# Patient Record
Sex: FEMALE | Race: BLACK OR AFRICAN AMERICAN | URBAN - METROPOLITAN AREA
[De-identification: names, ages, dates, MRNs, and addresses within clinical notes are randomized per-mention and may not be internally consistent; named-entity substitution may affect disease eponyms.]

---

## 2024-07-10 ENCOUNTER — ANESTHESIA (OUTPATIENT)
Dept: OBSTETRICS AND GYNECOLOGY | Facility: HOSPITAL | Age: 24
End: 2024-07-10
Payer: MEDICAID

## 2024-07-10 ENCOUNTER — HOSPITAL ENCOUNTER (INPATIENT)
Facility: HOSPITAL | Age: 24
LOS: 1 days | Discharge: HOME | End: 2024-07-11
Attending: STUDENT IN AN ORGANIZED HEALTH CARE EDUCATION/TRAINING PROGRAM | Admitting: STUDENT IN AN ORGANIZED HEALTH CARE EDUCATION/TRAINING PROGRAM
Payer: MEDICAID

## 2024-07-10 ENCOUNTER — ANESTHESIA EVENT (OUTPATIENT)
Dept: OBSTETRICS AND GYNECOLOGY | Facility: HOSPITAL | Age: 24
End: 2024-07-10
Payer: MEDICAID

## 2024-07-10 DIAGNOSIS — O26.893 ABDOMINAL PAIN DURING PREGNANCY IN THIRD TRIMESTER (HHS-HCC): Primary | ICD-10-CM

## 2024-07-10 DIAGNOSIS — K75.9 HEPATITIS: ICD-10-CM

## 2024-07-10 DIAGNOSIS — D69.6 THROMBOCYTOPENIA (CMS-HCC): ICD-10-CM

## 2024-07-10 DIAGNOSIS — O34.219 HISTORY OF CESAREAN DELIVERY AFFECTING PREGNANCY (HHS-HCC): ICD-10-CM

## 2024-07-10 DIAGNOSIS — O46.90 VAGINAL BLEEDING DURING PREGNANCY, ANTEPARTUM (HHS-HCC): ICD-10-CM

## 2024-07-10 DIAGNOSIS — B18.1: ICD-10-CM

## 2024-07-10 DIAGNOSIS — O36.4XX0 IUFD AT 20 WEEKS OR MORE OF GESTATION (HHS-HCC): ICD-10-CM

## 2024-07-10 DIAGNOSIS — O98.419: ICD-10-CM

## 2024-07-10 DIAGNOSIS — R10.9 ABDOMINAL PAIN DURING PREGNANCY IN THIRD TRIMESTER (HHS-HCC): Primary | ICD-10-CM

## 2024-07-10 LAB
ABO GROUP (TYPE) IN BLOOD: NORMAL
ABO GROUP (TYPE) IN BLOOD: NORMAL
ALBUMIN SERPL BCP-MCNC: 4.2 G/DL (ref 3.4–5)
ALP SERPL-CCNC: 147 U/L (ref 33–110)
ALT SERPL W P-5'-P-CCNC: 13 U/L (ref 7–45)
ANION GAP SERPL CALC-SCNC: 14 MMOL/L (ref 10–20)
ANTIBODY SCREEN: NORMAL
APTT PPP: 26 SECONDS (ref 27–38)
AST SERPL W P-5'-P-CCNC: 24 U/L (ref 9–39)
BILIRUB SERPL-MCNC: 1.3 MG/DL (ref 0–1.2)
BUN SERPL-MCNC: 6 MG/DL (ref 6–23)
CALCIUM SERPL-MCNC: 9.5 MG/DL (ref 8.6–10.6)
CHLORIDE SERPL-SCNC: 104 MMOL/L (ref 98–107)
CO2 SERPL-SCNC: 23 MMOL/L (ref 21–32)
CREAT SERPL-MCNC: 0.43 MG/DL (ref 0.5–1.05)
EGFRCR SERPLBLD CKD-EPI 2021: >90 ML/MIN/1.73M*2
ERYTHROCYTE [DISTWIDTH] IN BLOOD BY AUTOMATED COUNT: 13.3 % (ref 11.5–14.5)
ERYTHROCYTE [DISTWIDTH] IN BLOOD BY AUTOMATED COUNT: 13.4 % (ref 11.5–14.5)
FIBRINOGEN PPP-MCNC: 321 MG/DL (ref 200–400)
GLUCOSE SERPL-MCNC: 97 MG/DL (ref 74–99)
HCT VFR BLD AUTO: 29.5 % (ref 36–46)
HCT VFR BLD AUTO: 36.1 % (ref 36–46)
HGB BLD-MCNC: 11.4 G/DL (ref 12–16)
HGB BLD-MCNC: 9.5 G/DL (ref 12–16)
INR PPP: 0.9 (ref 0.9–1.1)
MCH RBC QN AUTO: 25.9 PG (ref 26–34)
MCH RBC QN AUTO: 26.3 PG (ref 26–34)
MCHC RBC AUTO-ENTMCNC: 31.6 G/DL (ref 32–36)
MCHC RBC AUTO-ENTMCNC: 32.2 G/DL (ref 32–36)
MCV RBC AUTO: 82 FL (ref 80–100)
MCV RBC AUTO: 82 FL (ref 80–100)
NRBC BLD-RTO: 0 /100 WBCS (ref 0–0)
NRBC BLD-RTO: 0 /100 WBCS (ref 0–0)
PLATELET # BLD AUTO: 79 X10*3/UL (ref 150–450)
PLATELET # BLD AUTO: 95 X10*3/UL (ref 150–450)
POTASSIUM SERPL-SCNC: 3.8 MMOL/L (ref 3.5–5.3)
PROT SERPL-MCNC: 7.8 G/DL (ref 6.4–8.2)
PROTHROMBIN TIME: 10.6 SECONDS (ref 9.8–12.8)
RBC # BLD AUTO: 3.61 X10*6/UL (ref 4–5.2)
RBC # BLD AUTO: 4.41 X10*6/UL (ref 4–5.2)
RH FACTOR (ANTIGEN D): NORMAL
RH FACTOR (ANTIGEN D): NORMAL
SODIUM SERPL-SCNC: 137 MMOL/L (ref 136–145)
TREPONEMA PALLIDUM IGG+IGM AB [PRESENCE] IN SERUM OR PLASMA BY IMMUNOASSAY: NONREACTIVE
WBC # BLD AUTO: 5.9 X10*3/UL (ref 4.4–11.3)
WBC # BLD AUTO: 7.6 X10*3/UL (ref 4.4–11.3)

## 2024-07-10 PROCEDURE — 85610 PROTHROMBIN TIME: CPT | Performed by: STUDENT IN AN ORGANIZED HEALTH CARE EDUCATION/TRAINING PROGRAM

## 2024-07-10 PROCEDURE — 85027 COMPLETE CBC AUTOMATED: CPT | Performed by: STUDENT IN AN ORGANIZED HEALTH CARE EDUCATION/TRAINING PROGRAM

## 2024-07-10 PROCEDURE — 86923 COMPATIBILITY TEST ELECTRIC: CPT

## 2024-07-10 PROCEDURE — 2500000004 HC RX 250 GENERAL PHARMACY W/ HCPCS (ALT 636 FOR OP/ED): Performed by: STUDENT IN AN ORGANIZED HEALTH CARE EDUCATION/TRAINING PROGRAM

## 2024-07-10 PROCEDURE — 59409 OBSTETRICAL CARE: CPT | Performed by: STUDENT IN AN ORGANIZED HEALTH CARE EDUCATION/TRAINING PROGRAM

## 2024-07-10 PROCEDURE — 86780 TREPONEMA PALLIDUM: CPT | Performed by: STUDENT IN AN ORGANIZED HEALTH CARE EDUCATION/TRAINING PROGRAM

## 2024-07-10 PROCEDURE — 36415 COLL VENOUS BLD VENIPUNCTURE: CPT | Performed by: STUDENT IN AN ORGANIZED HEALTH CARE EDUCATION/TRAINING PROGRAM

## 2024-07-10 PROCEDURE — 80053 COMPREHEN METABOLIC PANEL: CPT | Performed by: STUDENT IN AN ORGANIZED HEALTH CARE EDUCATION/TRAINING PROGRAM

## 2024-07-10 PROCEDURE — 85384 FIBRINOGEN ACTIVITY: CPT | Performed by: STUDENT IN AN ORGANIZED HEALTH CARE EDUCATION/TRAINING PROGRAM

## 2024-07-10 PROCEDURE — 86901 BLOOD TYPING SEROLOGIC RH(D): CPT | Performed by: STUDENT IN AN ORGANIZED HEALTH CARE EDUCATION/TRAINING PROGRAM

## 2024-07-10 PROCEDURE — 1100000001 HC PRIVATE ROOM DAILY

## 2024-07-10 PROCEDURE — 2500000004 HC RX 250 GENERAL PHARMACY W/ HCPCS (ALT 636 FOR OP/ED)

## 2024-07-10 RX ORDER — ONDANSETRON HYDROCHLORIDE 2 MG/ML
4 INJECTION, SOLUTION INTRAVENOUS EVERY 6 HOURS PRN
Status: CANCELLED | OUTPATIENT
Start: 2024-07-10

## 2024-07-10 RX ORDER — OXYTOCIN 10 [USP'U]/ML
10 INJECTION, SOLUTION INTRAMUSCULAR; INTRAVENOUS ONCE AS NEEDED
Status: DISCONTINUED | OUTPATIENT
Start: 2024-07-10 | End: 2024-07-11 | Stop reason: HOSPADM

## 2024-07-10 RX ORDER — OXYTOCIN 10 [USP'U]/ML
10 INJECTION, SOLUTION INTRAMUSCULAR; INTRAVENOUS ONCE AS NEEDED
Status: CANCELLED | OUTPATIENT
Start: 2024-07-10

## 2024-07-10 RX ORDER — ADHESIVE BANDAGE
10 BANDAGE TOPICAL
Status: CANCELLED | OUTPATIENT
Start: 2024-07-10

## 2024-07-10 RX ORDER — ONDANSETRON 4 MG/1
4 TABLET, FILM COATED ORAL EVERY 6 HOURS PRN
Status: DISCONTINUED | OUTPATIENT
Start: 2024-07-10 | End: 2024-07-11 | Stop reason: HOSPADM

## 2024-07-10 RX ORDER — OXYTOCIN/0.9 % SODIUM CHLORIDE 30/500 ML
60 PLASTIC BAG, INJECTION (ML) INTRAVENOUS ONCE AS NEEDED
Status: CANCELLED | OUTPATIENT
Start: 2024-07-10

## 2024-07-10 RX ORDER — CARBOPROST TROMETHAMINE 250 UG/ML
250 INJECTION, SOLUTION INTRAMUSCULAR ONCE AS NEEDED
Status: DISCONTINUED | OUTPATIENT
Start: 2024-07-10 | End: 2024-07-11 | Stop reason: HOSPADM

## 2024-07-10 RX ORDER — MISOPROSTOL 200 UG/1
800 TABLET ORAL ONCE AS NEEDED
Status: DISCONTINUED | OUTPATIENT
Start: 2024-07-10 | End: 2024-07-11 | Stop reason: HOSPADM

## 2024-07-10 RX ORDER — LIDOCAINE 560 MG/1
1 PATCH PERCUTANEOUS; TOPICAL; TRANSDERMAL
Status: CANCELLED | OUTPATIENT
Start: 2024-07-10

## 2024-07-10 RX ORDER — HYDRALAZINE HYDROCHLORIDE 20 MG/ML
5 INJECTION INTRAMUSCULAR; INTRAVENOUS ONCE AS NEEDED
Status: DISCONTINUED | OUTPATIENT
Start: 2024-07-10 | End: 2024-07-11 | Stop reason: HOSPADM

## 2024-07-10 RX ORDER — ONDANSETRON HYDROCHLORIDE 2 MG/ML
4 INJECTION, SOLUTION INTRAVENOUS EVERY 6 HOURS PRN
Status: DISCONTINUED | OUTPATIENT
Start: 2024-07-10 | End: 2024-07-11 | Stop reason: HOSPADM

## 2024-07-10 RX ORDER — CARBOPROST TROMETHAMINE 250 UG/ML
250 INJECTION, SOLUTION INTRAMUSCULAR ONCE AS NEEDED
Status: CANCELLED | OUTPATIENT
Start: 2024-07-10

## 2024-07-10 RX ORDER — METHYLERGONOVINE MALEATE 0.2 MG/ML
0.2 INJECTION INTRAVENOUS ONCE AS NEEDED
Status: DISCONTINUED | OUTPATIENT
Start: 2024-07-10 | End: 2024-07-11 | Stop reason: HOSPADM

## 2024-07-10 RX ORDER — LOPERAMIDE HYDROCHLORIDE 2 MG/1
4 CAPSULE ORAL EVERY 2 HOUR PRN
Status: DISCONTINUED | OUTPATIENT
Start: 2024-07-10 | End: 2024-07-11 | Stop reason: HOSPADM

## 2024-07-10 RX ORDER — OXYTOCIN/0.9 % SODIUM CHLORIDE 30/500 ML
60 PLASTIC BAG, INJECTION (ML) INTRAVENOUS ONCE AS NEEDED
Status: COMPLETED | OUTPATIENT
Start: 2024-07-10 | End: 2024-07-10

## 2024-07-10 RX ORDER — NIFEDIPINE 10 MG/1
10 CAPSULE ORAL ONCE AS NEEDED
Status: CANCELLED | OUTPATIENT
Start: 2024-07-10

## 2024-07-10 RX ORDER — MISOPROSTOL 200 UG/1
800 TABLET ORAL ONCE AS NEEDED
Status: CANCELLED | OUTPATIENT
Start: 2024-07-10

## 2024-07-10 RX ORDER — METOCLOPRAMIDE 10 MG/1
10 TABLET ORAL EVERY 6 HOURS PRN
Status: DISCONTINUED | OUTPATIENT
Start: 2024-07-10 | End: 2024-07-11 | Stop reason: HOSPADM

## 2024-07-10 RX ORDER — ONDANSETRON 4 MG/1
4 TABLET, FILM COATED ORAL EVERY 6 HOURS PRN
Status: CANCELLED | OUTPATIENT
Start: 2024-07-10

## 2024-07-10 RX ORDER — HYDROMORPHONE HYDROCHLORIDE 1 MG/ML
INJECTION, SOLUTION INTRAMUSCULAR; INTRAVENOUS; SUBCUTANEOUS
Status: COMPLETED
Start: 2024-07-10 | End: 2024-07-10

## 2024-07-10 RX ORDER — IBUPROFEN 600 MG/1
600 TABLET ORAL EVERY 6 HOURS
Status: DISCONTINUED | OUTPATIENT
Start: 2024-07-10 | End: 2024-07-11 | Stop reason: HOSPADM

## 2024-07-10 RX ORDER — TERBUTALINE SULFATE 1 MG/ML
0.25 INJECTION SUBCUTANEOUS ONCE AS NEEDED
Status: DISCONTINUED | OUTPATIENT
Start: 2024-07-10 | End: 2024-07-11 | Stop reason: HOSPADM

## 2024-07-10 RX ORDER — DIPHENHYDRAMINE HYDROCHLORIDE 50 MG/ML
25 INJECTION INTRAMUSCULAR; INTRAVENOUS EVERY 6 HOURS PRN
Status: CANCELLED | OUTPATIENT
Start: 2024-07-10

## 2024-07-10 RX ORDER — ACETAMINOPHEN 325 MG/1
975 TABLET ORAL EVERY 6 HOURS
Status: DISCONTINUED | OUTPATIENT
Start: 2024-07-10 | End: 2024-07-11 | Stop reason: HOSPADM

## 2024-07-10 RX ORDER — OXYTOCIN/0.9 % SODIUM CHLORIDE 30/500 ML
PLASTIC BAG, INJECTION (ML) INTRAVENOUS
Status: DISPENSED
Start: 2024-07-10 | End: 2024-07-11

## 2024-07-10 RX ORDER — METOCLOPRAMIDE HYDROCHLORIDE 5 MG/ML
10 INJECTION INTRAMUSCULAR; INTRAVENOUS EVERY 6 HOURS PRN
Status: DISCONTINUED | OUTPATIENT
Start: 2024-07-10 | End: 2024-07-11 | Stop reason: HOSPADM

## 2024-07-10 RX ORDER — SODIUM CHLORIDE, SODIUM LACTATE, POTASSIUM CHLORIDE, CALCIUM CHLORIDE 600; 310; 30; 20 MG/100ML; MG/100ML; MG/100ML; MG/100ML
125 INJECTION, SOLUTION INTRAVENOUS CONTINUOUS
Status: DISCONTINUED | OUTPATIENT
Start: 2024-07-10 | End: 2024-07-11 | Stop reason: HOSPADM

## 2024-07-10 RX ORDER — HYDRALAZINE HYDROCHLORIDE 20 MG/ML
5 INJECTION INTRAMUSCULAR; INTRAVENOUS ONCE AS NEEDED
Status: CANCELLED | OUTPATIENT
Start: 2024-07-10

## 2024-07-10 RX ORDER — LABETALOL HYDROCHLORIDE 5 MG/ML
20 INJECTION, SOLUTION INTRAVENOUS ONCE AS NEEDED
Status: DISCONTINUED | OUTPATIENT
Start: 2024-07-10 | End: 2024-07-11 | Stop reason: HOSPADM

## 2024-07-10 RX ORDER — METHYLERGONOVINE MALEATE 0.2 MG/ML
0.2 INJECTION INTRAVENOUS ONCE AS NEEDED
Status: CANCELLED | OUTPATIENT
Start: 2024-07-10

## 2024-07-10 RX ORDER — BISACODYL 10 MG/1
10 SUPPOSITORY RECTAL DAILY PRN
Status: CANCELLED | OUTPATIENT
Start: 2024-07-10

## 2024-07-10 RX ORDER — LIDOCAINE HYDROCHLORIDE 10 MG/ML
30 INJECTION INFILTRATION; PERINEURAL ONCE AS NEEDED
Status: DISCONTINUED | OUTPATIENT
Start: 2024-07-10 | End: 2024-07-11 | Stop reason: HOSPADM

## 2024-07-10 RX ORDER — SIMETHICONE 80 MG
80 TABLET,CHEWABLE ORAL 4 TIMES DAILY PRN
Status: CANCELLED | OUTPATIENT
Start: 2024-07-10

## 2024-07-10 RX ORDER — LABETALOL HYDROCHLORIDE 5 MG/ML
20 INJECTION, SOLUTION INTRAVENOUS ONCE AS NEEDED
Status: CANCELLED | OUTPATIENT
Start: 2024-07-10

## 2024-07-10 RX ORDER — POLYETHYLENE GLYCOL 3350 17 G/17G
17 POWDER, FOR SOLUTION ORAL 2 TIMES DAILY PRN
Status: CANCELLED | OUTPATIENT
Start: 2024-07-10

## 2024-07-10 RX ORDER — NIFEDIPINE 10 MG/1
10 CAPSULE ORAL ONCE AS NEEDED
Status: DISCONTINUED | OUTPATIENT
Start: 2024-07-10 | End: 2024-07-11 | Stop reason: HOSPADM

## 2024-07-10 RX ORDER — LOPERAMIDE HYDROCHLORIDE 2 MG/1
4 CAPSULE ORAL EVERY 2 HOUR PRN
Status: CANCELLED | OUTPATIENT
Start: 2024-07-10

## 2024-07-10 RX ORDER — DIPHENHYDRAMINE HCL 25 MG
25 CAPSULE ORAL EVERY 6 HOURS PRN
Status: CANCELLED | OUTPATIENT
Start: 2024-07-10

## 2024-07-10 RX ORDER — TRANEXAMIC ACID 100 MG/ML
1000 INJECTION, SOLUTION INTRAVENOUS ONCE AS NEEDED
Status: DISCONTINUED | OUTPATIENT
Start: 2024-07-10 | End: 2024-07-11 | Stop reason: HOSPADM

## 2024-07-10 RX ORDER — TRANEXAMIC ACID 100 MG/ML
1000 INJECTION, SOLUTION INTRAVENOUS ONCE AS NEEDED
Status: CANCELLED | OUTPATIENT
Start: 2024-07-10

## 2024-07-10 ASSESSMENT — PAIN SCALES - GENERAL
PAINLEVEL_OUTOF10: 0 - NO PAIN
PAINLEVEL_OUTOF10: 0 - NO PAIN

## 2024-07-10 NOTE — PROGRESS NOTES
Intrapartum Progress Note    Assessment/Plan   Ramez Stoddard is a 23 y.o.  at 39w3d, ALFRED: 2024, by Other Basis admitted for contractions and bleeding, found to have an IUFD    IUFD, abruption  No fetal cardiac activity on bedside ultrasound, reconfirmed on ultrasound with Dr. Elias. No doppler flow.   Risk reducing cfDNA per chart review. Normal anatomy ultrasound  Reviewed causes of IUFD and evaluation. Reviewed that at this time, we do not know why this happened. Condolences expressed.  Patient uncomfortably gladys. Patient and partner expressed desire for time to process. Will plan to come back shortly for cervical examination and to discuss expectant management if in labor vs. augmentation  Records requested from [facility]  A1c, TSH, APLS, infectious workup ordered, but patient currently declining further blood draws. Will readdress and collect if patient amenable    Thrombocytopenia, chronic  Prenatal records indicate chronic thrombocytopenia. Initially in low 100-110s, progressing to 70s throughout prenatal course  Platelets on admission 79  BPs all normotensive, low suspicion for pre-eclampsia process  S/p hematology consultation on 3/13/24: at that time, differential including congential thrombocytopenia with concomitant gestational thrombocytopenia vs hepatitis B vs underlying liver or spleen disorder  Abdominal ultrasound at that time notable for splenomegaly to 17cm. Normal liver and gallbladder.  Will consider consultation during admission    Hepatitis B infection  Patient positive for hepatitis B  During prenatal care recommended to follow up with hepatology, patient has not seen  Will refer on discharge    Maternal left pelvic horseshoe kidney  Noted on prenatal ultrasound  No current indication for OR, for attention if patient requires OR    Prenatal care  Hx Csx1, term, 8#13oz, unknown uterine incision, failure to progress in first stage  Rh positive, no  antibodies  Hepatitis B positive as above  GC/CT negative  RPR false positive per outside records, pending on admission    Seen and discussed with Dr. Elias. Martti  used for encounter.    Meseret Quintero MD  PGY-4, Obstetrics and Gynecology    Subjective   Patient breathing uncomfortably through contractions    Objective   Last Vitals:  Temp Pulse Resp BP MAP Pulse Ox   36.4 °C (97.5 °F) 95 20 122/67   99 %     Vitals Min/Max Last 24 Hours:  Temp  Min: 36.4 °C (97.5 °F)  Max: 36.4 °C (97.5 °F)  Pulse  Min: 90  Max: 112  Resp  Min: 16  Max: 20  BP  Min: 111/74  Max: 122/67    Intake/Output:  No intake or output data in the 24 hours ending 07/10/24 1248    Physical Examination:  General: breathing uncomfortably   HEENT: normocephalic, atraumatic  Heart: warm and well perfused  Lungs: breathing comfortably on room air  Abdomen: gravid  Extremities: moving all extremities  Neuro: awake and conversant  Psych: appropriate mood and affect    Bedside ultrasound with single fetus in cephalic presentation, no fetal cardiac activity or blood flow present through organs.    Lab Review:  Labs in chart have been reviewed

## 2024-07-10 NOTE — SIGNIFICANT EVENT
Attending provider to bedside in the setting of new onset vaginal bleeding and abdominal pain.     In short,  at 39.3, records from [facility] unavailable, history of thrombocytopenia and  delivery, [place] speaking.    On bedside ultrasound, unable to visualize fetal cardiac activity. Doppler flow applied to bladder, abdomen, thorax and brain with no flow. Subjective oligohydramnios, cephalic.     Informed patient of concern for fetal demise. She requested waiting for her  prior to any further counseling or work up.     Handed off to day team.

## 2024-07-10 NOTE — NURSING NOTE
Pt refusing fundal exams. RN explained importance of fundal exams and voiced concern for bleeding. (Martti used for translation) Pt refused perineal repair for second degree laceration at delivery, now asking to be left alone. RN unable to complete full head to toe post-delivery. Curt SANTOS notified. Blood pressures currently cycling every 15 minutes.

## 2024-07-10 NOTE — L&D DELIVERY NOTE
OB Delivery Note  7/10/2024  Ramez Stoddard  23 y.o.   Vaginal, Spontaneous     Gestational Age: 39w3d  /Para:   Quantitative Blood Loss: 400 mL    Armin Stoddard FD [[id number]]      Labor Events    Rupture date/time: 7/10/2024 0530  Rupture type: Spontaneous  Fluid color: Meconium  Fluid odor: None  Labor type: Spontaneous Onset of Labor  Labor allowed to proceed with plans for an attempted vaginal birth?: Yes  Augmentation: None  Complications: None  Additional complications: IUFD at 20 weeks or more of gestation ([facility])       Labor Event Times    Dilation complete date/time: 7/10/2024 1145  Start pushing date/time: 7/10/2024 1150       Labor Length    2nd stage: 0h 07m  3rd stage: 0h 04m       Placenta    Placenta delivery date/time: 7/10/2024 1157  Placenta removal: Spontaneous  Placenta appearance: Intact  Placenta disposition: pathology       Cord    Vessels: 3 vessels  Complications: Wrapped  Cord around: trunk  Delayed cord clamping?: No  Gases sent?: No  Stem cell collection (by provider): No       Lacerations    Perineal laceration: 2nd  Perineal laceration repaired?: No       Anesthesia    Method: None       Operative Delivery    Forceps attempted?: No  Vacuum extractor attempted?: No       Shoulder Dystocia    Shoulder dystocia present?: No       [place] Delivery    Birth date/time: 7/10/2024 11:52:14  Delivery type: Vaginal, Spontaneous  Complications: None       Apgars    Living status: Fetal Demise  Apgar Component Scores:  1 min.:  5 min.:  10 min.:  15 min.:  20 min.:    Skin color:  0  0  0  0  0    Heart rate:  0  0  0  0  0    Reflex irritability:  0  0  0  0  0    Muscle tone:  0  0  0  0  0    Respiratory effort:  0  0  0  0  0    Total:  0  0  0  0  0           Delivery Providers    Delivering clinician: Shante Elias MD   Provider Role    Fernanda Aragon RN Delivery Nurse    Janett Vargas RN Nursery Nurse     Resident               Intrauterine Device  Inserted : No, declined     of IUFD at 39w3d. Patient presented with active vaginal bleeding, in labor with IUFD confirmed at presentation. Patient proceeded to deliver with suspected second degree laceration however patient declined fundal checks and laceration repair.      No signs of life at birth.     Findings/fetal examination:  - Size: Appropriate for gestational age  - Head: No abnormalities   - Face (eyes/nose/ears/philtrum/mouth/chin): No abnormalities  - Skin abnormalities/desquamation: No abnormalities  - Hands/feet: No abnormalities  - Genitalia: No abnormalities  - Spine: No abnormalities    Umbilical cord examination: No abnormalities    Placental examination: Mec stained, No abnormalities    Dispo:   - Autopsy requested/mortician consulted      mL    Shante Elias MD

## 2024-07-10 NOTE — NURSING NOTE
[place] female delivered with no signs of life.  No heartbeat auscultated, no spontaneous respirations.  No tone to limbs.  Skin is intact with the exception of some peeling to upper arms and to right upper thigh (posteriorly). Left cheek with small blister, additional blister noted in area between vagina and buttock. Skin is meconium stained. Female genitalia is normal appearing. Some molding noted to skull.

## 2024-07-10 NOTE — SIGNIFICANT EVENT
To bedside to evaluate patient postpartum.    Encounter performed with Malaika.    24yo  now PPD0 s/p  of IUFD at 39.3wga. Delivery complicated by at least second degree laceration without repair (patient declined).    Patient currently reports no pain. She agrees that she is having persistent bleeding. On physical examination, patient with additional 300mL bright red gush from vagina. Declining fundal examination due to pain at uterus and declining further perineal examination.    Discussed with patient concern for persistent bleeding and postpartum hemorrhage, especially given platelets starting at 79. Discussed risk of hemorrhage leading to maternal morbidity including possibly undiagnosed uterine rupture, clot in uterus, and risk of maternal death if bleeding persists. Offered bedside examination with pain medication, patient declined. After visible persistent bleeding, recommended evaluation in operating room for examination under anesthesia to identify source of bleeding. Patient declined. Patient states that she is in no pain and had more bleeding during her last delivery and does not feel like this is that much bleeding. Discussed how this pregnancy and delivery is different ( of IUFD) and would strongly recommend further evaluation of bleeding. Patient declined and does not feel like her life is in danger. Stated that she would let us know if she starts to feel worse.    Vital signs currently normotensive, nontachycardic (HR 86, /67).     Asked if patient would be amenable to checking blood counts at this time, she is agreeable to two vials of blood only. Will order stat CBC and stat ABO verify for T&C 2u pRBC and 2u platelets.    Seen and discussed with Dr. Curt Quintero MD  PGY-4, Obstetrics and Gynecology

## 2024-07-10 NOTE — SIGNIFICANT EVENT
Check-in    Patient still with active bleeding - current EBL is ~1L (including from delivery). Patient stood up to use bathroom and had to sit down 2/2 dizziness.     Vitals:    07/10/24 1533 07/10/24 1534 07/10/24 1535 07/10/24 1539   BP: 97/50      Pulse: 74 81  89   Resp:       Temp:   36.3 °C (97.3 °F)    TempSrc:       SpO2:  99%  99%   Weight:            Results from last 7 days   Lab Units 07/10/24  1412 07/10/24  1023   WBC AUTO x10*3/uL 7.6 5.9   HEMOGLOBIN g/dL 9.5* 11.4*   HEMATOCRIT % 29.5* 36.1   PLATELETS AUTO x10*3/uL 95* 79*   PROTIME seconds  --  10.6   INR   --  0.9   FIBRINOGEN mg/dL  --  321       Discussed with patient my concern for continued blood loss secondary to possible uterine bleeding (refusing fundal checks) and unrepaired laceration (refusing lac evaluation and repair). Again reviewed that she is now symptomatic with dizziness when standing and softer blood pressures and she may continue to bleed until situation becomes emergent. Patient reports she is feeling fine and declines any intervention at this time. Plan to continue to monitor closely.    Shante Elias MD  OBGYN Attending

## 2024-07-10 NOTE — ANESTHESIA PREPROCEDURE EVALUATION
Patient: Ramez Stoddard    Evaluation Method: In-person visit    Procedure Information    Date: 07/10/24  Procedure: Labor Consult         Relevant Problems   Liver  splenomegaly   (+) Hepatitis      Hematology   (+) Thrombocytopenia (CMS-HCC)       Clinical information reviewed:    Allergies                NPO Detail:  No data recorded     OB/Gyn Evaluation    Present Pregnancy    Patient is pregnant now.  (+) , intrauterine fetal demise, thrombocytopenia of pregnancy   Obstetric History    (+)  section - primary              Physical Exam    Airway  Mallampati: II  TM distance: >3 FB  Neck ROM: full     Cardiovascular    Dental    Pulmonary    Abdominal            Anesthesia Plan    History of general anesthesia?: no  History of complications of general anesthesia?: no    ASA 3     epidural and other   (Pt would like to decide later if she wants an epidural.)    Talked with pt using the  via [employer]. Explained to pt that we need to see what her platelets are to decide if we can do an epidural. Discussed risks and benefits of epidural with patient.

## 2024-07-10 NOTE — SIGNIFICANT EVENT
"S/p delivery of term IUFD.  mL.    Discussion via [employer]  on Martti.     Patient refusing fundal checks and will not allow examination of laceration (suspect 2nd degree laceration). Patient still refusing examination after 0.2 of IV Dilaudid. Patient asking us to leave her alone at this time. Expressed concern regarding hemorrhage, possible uterine rupture, needing blood transfusion, need for hysterectomy and even death. Patient reports she is not concerned about her bleeding because she is not in pain. States she will let us know when she is pain and needs help. States \"do not worry my bleeding is fine\". Requested repeat labs. Patient willing to give us 2 blood vials at this time.     Shante Elias MD  OBGYN Attending     "

## 2024-07-10 NOTE — SIGNIFICANT EVENT
Patient consented via Martti.     SVE 5/90/-2. Patient is gladys painfully, minimal vaginal bleeding. Plan for recheck in 2 hours, if patient is unchanged, will start pitocin.    Discussed options for pain control (IV pain meds vs Epidural) however Epidural would depend on platelets.  Attempting IV and lab draw now. Patient very concerned about number of vials for blood, will only send vials needed for T&S, CBC, CMP, Coags and Fibrinogen at this time.     Patient reports baby sex is female. No name has been chosen at this time. We briefly discussed her wishes following birth including seeing baby and holding baby. Patient is considering at this time. , child and mother at bedside. All questions answered.     Shante Elias MD  OBGYN Attending

## 2024-07-10 NOTE — SIGNIFICANT EVENT
To bedside to meet patient. Discussion via [employer]  on Martti.     Reviewed previous findings by Dr. Thomas regarding suspected IUFD. BSUS performed with no fetal cardiac activity visualized and no doppler flow to major organs. Findings communicated to patient,  and patient's mother, confirming IUFD. Expressed sincere condolences.     External observation of bleeding, stable. Patient declining labs/IV at this time. Discussed giving patient and family time to process/grieve and will return shortly to discuss next steps.     Prenatal records requested from [facility]. Will fax over now.    Shante Elias MD  OBGYN Attending

## 2024-07-10 NOTE — H&P
Obstetrical Admission History and Physical    Assessment/Plan    Ramez Stoddard is a 23 y.o.  at 39.3 wga by outside dating presented to triage for bleeding and CTXs and diagnosed with IUFD in triage.    IUFD, abruption  - No fetal cardiac activity in triage. Signed out to day to team to confirm IUFD. Patient requesting some space prior to repeat US/exam at this time. She was able to talk to her  on the phone and he is on his way to the hospital.  - Given heavy vaginal bleeding suspect abruption, possibly SROM'd but SVE/SSE deferred  - Will collect admission labs and abruption labs now    Signed out to the day team    Patient seen and discussed with Dr. Martha Bowser MD  OBGYN    Subjective   22 yo  at 39.3 wga by outside dating presented to triage for CTX and VB and found to have IUFD in triage. She reports having normal fetal movement yesterday. Bleeding and cramping started overnight around 2AM and CTXs are getting closer together. She is feeling slightly lightheaded. She is Kinyar[place] speaking and a [employer] was used for this encounter.    Gets care [facility]    Pregnancy notable for:  - thrombocytopenia, last plts 78 on 24  - maternal L pelvic kidney  - h/o C/S, unknown uterine incision  - chronic Hep B  - Kinyarwandi speaking    Pregnancy notable for:  Medical Problems       Problem List       * (Principal) IUFD at 20 weeks or more of gestation ([facility])          Obstetrical History   OB History    Para Term  AB Living   2 1           SAB IAB Ectopic Multiple Live Births                  # Outcome Date GA Lbr Javier/2nd Weight Sex Type Anes PTL Lv   2 Current            1 Para                Past Medical History  No past medical history on file.     Past Surgical History   No past surgical history on file.    Social History  Social History     Tobacco Use    Smoking status: Not on file    Smokeless tobacco: Not on file   Substance Use  Topics    Alcohol use: Not on file     Substance and Sexual Activity   Drug Use Not on file       Allergies  Patient has no allergy information on record.     Medications  No medications prior to admission.       Objective    Last Vitals  Temp Pulse Resp BP MAP O2 Sat     (!) 110 16 111/74   100 %     Physical Examination  General: no acute distress  HEENT: normocephalic, atraumatic  Heart: warm and well perfused  Lungs: breathing comfortably on room air  Abdomen: gravid  Extremities: moving all extremities  Neuro: awake and conversant  Psych: appropriate mood and affect    : SVE deferred, vaginal bleeding coating her thighs and running down her legse  BSUS: cephalic, no fetal cardiac activity    Lab Review  Labs in chart have been reviewed.

## 2024-07-11 ENCOUNTER — DOCUMENTATION (OUTPATIENT)
Dept: CASE MANAGEMENT | Facility: HOSPITAL | Age: 24
End: 2024-07-11
Payer: MEDICAID

## 2024-07-11 ENCOUNTER — PHARMACY VISIT (OUTPATIENT)
Dept: PHARMACY | Facility: CLINIC | Age: 24
End: 2024-07-11
Payer: MEDICAID

## 2024-07-11 VITALS
TEMPERATURE: 98.1 F | WEIGHT: 183.64 LBS | OXYGEN SATURATION: 98 % | HEART RATE: 94 BPM | RESPIRATION RATE: 16 BRPM | SYSTOLIC BLOOD PRESSURE: 111 MMHG | DIASTOLIC BLOOD PRESSURE: 59 MMHG

## 2024-07-11 PROCEDURE — RXMED WILLOW AMBULATORY MEDICATION CHARGE

## 2024-07-11 PROCEDURE — 88307 TISSUE EXAM BY PATHOLOGIST: CPT | Mod: TC,SUR | Performed by: STUDENT IN AN ORGANIZED HEALTH CARE EDUCATION/TRAINING PROGRAM

## 2024-07-11 RX ORDER — IBUPROFEN 600 MG/1
600 TABLET ORAL EVERY 6 HOURS
Qty: 120 TABLET | Refills: 0 | Status: SHIPPED | OUTPATIENT
Start: 2024-07-11 | End: 2024-08-10

## 2024-07-11 RX ORDER — POLYETHYLENE GLYCOL 3350 17 G/17G
17 POWDER, FOR SOLUTION ORAL DAILY
Qty: 30 PACKET | Refills: 1 | Status: SHIPPED | OUTPATIENT
Start: 2024-07-11 | End: 2024-09-09

## 2024-07-11 RX ORDER — ACETAMINOPHEN 325 MG/1
975 TABLET ORAL EVERY 6 HOURS
Qty: 360 TABLET | Refills: 0 | Status: SHIPPED | OUTPATIENT
Start: 2024-07-11 | End: 2024-08-10

## 2024-07-11 ASSESSMENT — PAIN SCALES - GENERAL
PAINLEVEL_OUTOF10: 0 - NO PAIN

## 2024-07-11 NOTE — DISCHARGE SUMMARY
Discharge Summary    Admission Date: 7/10/2024  Discharge Date: 2024    Discharge Diagnosis  IUFD at 20 weeks or more of gestation ([facility]HS-HCC)    Hospital Course  Delivery Date: 7/10/2024 11:52 AM  Delivery type: Vaginal, Spontaneous   GA at delivery: 39w3d  Outcome: Fetal Demise  Anesthesia during delivery: None  Intrapartum complications: None  Feeding method:       Procedures: none  Contraception at discharge: none    24yo  admitted on 7/10 for labor and was diagnosed with an IUFD on bedside ultrasound. She underwent an uncomplicated  delivery of the fetus on 7/10, please see delivery note for details. On presentation, patient had significant vaginal bleeding that was consistent with placental abruption, although patient had no risk factors. Patient had at least a 2nd degree laceration at time of delivery and declined repair of laceration. She had a total EBL of 1300mL during her stay, hemoglobin dropped from 11.4 -> 9.5. She also has a history of known thrombocytopenia and platelets trended from 79 -> 95; hematology referral was requested on discharge. The patient declined maternal bloodwork for IUFD evaluation but requested fetal autopsy with genetics. She elected for private disposition, which was coordinated by the mortician and Liz Mosher. By discharge, her bleeding was minimal and pain well controlled. She was discharged home with a postpartum follow up requested, hepatology follow up requested for chronic hepatitis B, and hematology follow up requested.    Pertinent Physical Exam At Time of Discharge  General: no acute distress  HEENT: normocephalic, atraumatic  Heart: warm and well perfused  Lungs: breathing comfortably on room air  Abdomen: declined  : declined  Extremities: moving all extremities  Neuro: awake and conversant  Psych: appropriate mood and affect      Discharge Meds     Your medication list        START taking these medications        Instructions Last Dose Given Next  Dose Due   acetaminophen 325 mg tablet  Commonly known as: Tylenol      Take 3 tablets (975 mg) by mouth every 6 hours.       ibuprofen 600 mg tablet      Take 1 tablet (600 mg) by mouth every 6 hours.       polyethylene glycol 17 gram packet  Commonly known as: Glycolax, Miralax      Take 17 g by mouth once daily.                 Where to Get Your Medications        These medications were sent to [facility]  [address], [address], [facility] [address]      Hours: 8AM to 6PM Mon-Fri, 8AM to 4PM Sat, 9AM to 1PM Sun Phone: [phone]   acetaminophen 325 mg tablet  ibuprofen 600 mg tablet  polyethylene glycol 17 gram packet          Complications Requiring Follow-Up  Postpartum care  Fetal loss    Test Results Pending At Discharge  Pending Labs       Order Current Status    CBC Collected (07/10/24 0725)    CBC Anemia Panel with Reflex, Pregnancy Collected (07/10/24 0725)    Cytomegalovirus Antibody, IgM Collected (07/10/24 0725)    Cytomegalovirus IgG Collected (07/10/24 0725)    HIV 1/2 Antigen/Antibody Screen with Reflex to Confirmation Collected (07/10/24 0725)    Hepatitis B Surface Antigen Collected (07/10/24 0725)    Hepatitis C Antibody Collected (07/10/24 0725)    Rubella Antibody, IgG Collected (07/10/24 0725)    Prenatal Screening Panel In process            Outpatient Follow-Up  No future appointments.        Meseret Quintero MD

## 2024-07-11 NOTE — PROGRESS NOTES
Postpartum Progress Note    Assessment/Plan   Ramez Stoddard is a 23 y.o.  now s/p  of IUD on 7/10/2024     Discussion via [employer]  on Martti.     Postpartum care  - Continue routine postpartum care  - Pt with known unrepaired 2nd deg, bleeding significantly decreased from early. Last pad with ~60cc over 3 hours  - EBL total 1600 including delivery and all pads and bleeding has decreased  - last hgb 9.5, plts 95  - AM CBC    IUFD  - Defer discussion on genetics and disposition until her  returns in the AM  - pt had previous declined multiple blood draws to collect labs for IUFD work up, can readdress  - Suspect abruption as cause however no known risk factors for abruption    Pregnancy notables:  - thrombocytopenia, last plts 78 on 24  - maternal L pelvic kidney  - h/o C/S, unknown uterine incision  - chronic Hep B  - Saji speaking    Seen and d/w Dr. Laith Bowser MD, PGY-4    Subjective   Pt reports she feeling physically well. Denies lightheaded/dizzinessness. Cramps are tolerable    Objective   Last Vitals:  Temp Pulse Resp BP MAP Pulse Ox   36.6 °C (97.9 °F) 100 16 104/58   99 %     Vitals Min/Max Last 24 Hours:  Temp  Min: 36.3 °C (97.3 °F)  Max: 36.6 °C (97.9 °F)  Pulse  Min: 74  Max: 133  Resp  Min: 16  Max: 20  BP  Min: 92/51  Max: 122/69    Intake/Output:    Intake/Output Summary (Last 24 hours) at 7/10/2024 2208  Last data filed at 7/10/2024 2028  Gross per 24 hour   Intake 2358 ml   Output 2718 ml   Net -360 ml       Physical Examination:  General: no acute distress  HEENT: normocephalic, atraumatic  Heart: normal rate, regular rhythm  Lungs: normal WOB  Abdomen: soft, nondistended  Extremities: moving all extremities  Neuro: awake and conversant, reflexes per RN  Psych: appropriate mood and affect    Lab Review:  Results from last 7 days   Lab Units 07/10/24  1412 07/10/24  1023   WBC AUTO x10*3/uL 7.6 5.9   HEMOGLOBIN g/dL 9.5* 11.4*    HEMATOCRIT % 29.5* 36.1   PLATELETS AUTO x10*3/uL 95* 79*   AST U/L  --  24   ALT U/L  --  13   CREATININE mg/dL  --  0.43*

## 2024-07-12 PROCEDURE — 87077 CULTURE AEROBIC IDENTIFY: CPT | Performed by: STUDENT IN AN ORGANIZED HEALTH CARE EDUCATION/TRAINING PROGRAM

## 2024-07-12 PROCEDURE — 87205 SMEAR GRAM STAIN: CPT | Performed by: STUDENT IN AN ORGANIZED HEALTH CARE EDUCATION/TRAINING PROGRAM

## 2024-07-12 PROCEDURE — 87070 CULTURE OTHR SPECIMN AEROBIC: CPT | Mod: 59 | Performed by: STUDENT IN AN ORGANIZED HEALTH CARE EDUCATION/TRAINING PROGRAM

## 2024-07-12 NOTE — PROGRESS NOTES
24: Ramez is a 23 y.o.  at 39.3 wga by outside dating presented to triage on 7/10 for bleeding and CTXs and diagnosed with IUFD in triage. She received her prenatal care through [facility].   Received referral for assistance in finding a  home as pt and her family are refugees from [facility]. She primarily speaks [place]iny[place]. Met with pt, , and their family as well as their  at bedside on L&D and utilized an  via [employer] in the room. Pt requested that I speak with her  (Wilmar) directly. Her  also speaks English but requested to still use the  for the rest of the family in the room. Pt's  explained that in their culture they bury their dead and thus they wish to not cremate. In their country, the doctor or hospital help to cover these costs. Explained that we do not have funding to help cover the costs of funerals/burials here. The  mortician's office did meet with the family already and discuss hospital disposition vs private disposition and the family declined hospital dispo.  Discussed with the family how burials have to go through a private  home as well as a cemetery and that some  homes/cemeteries will provide burial services at a reduced cost for babies but that they would need to call around to ask. I offered to assist in calling local  homes to gather cost information for the family and explained that this would take a couple of days (given it is Thursday afternoon). They had a lot of questions in regards to cost of /burial. They would also like to have a small service/viewing prior to going to the cemetery. Explained that they can absolutely request that. Further explained that anything additional that they would like to do with a  service or viewing will add more to the cost of the . They stated understanding. Pt's  agreed to this plan of me calling  homes (he  requested to try to have one nearby so they don't have to travel too far) and getting back to him in a few days. The family requested the baby have an autopsy prior to going to the  home. Provided Wilmar with my direct contact information in case they have further questions. Their plan is to return home today. Offered support items for their 3 year old son who is with them in the room which they declined at this time.   Spoke with  mortician staff to inform them of above. Will work on calling  homes to assist in finding prices for the family.       Liz Mosher, GRAHAM, JAYLIN, CGP  [facility] Director of Parent Bereavement Programs  [phone]

## 2024-07-14 LAB
BACTERIA BLD AEROBE CULT: ABNORMAL
BACTERIA BLD CULT: ABNORMAL
BACTERIA SPEC CULT: NORMAL
BACTERIA SPEC CULT: NORMAL
BLOOD EXPIRATION DATE: NORMAL
BLOOD EXPIRATION DATE: NORMAL
DISPENSE STATUS: NORMAL
DISPENSE STATUS: NORMAL
GRAM STN SPEC: ABNORMAL
GRAM STN SPEC: NORMAL
PRODUCT BLOOD TYPE: 5100
PRODUCT BLOOD TYPE: 5100
PRODUCT CODE: NORMAL
PRODUCT CODE: NORMAL
UNIT ABO: NORMAL
UNIT ABO: NORMAL
UNIT NUMBER: NORMAL
UNIT NUMBER: NORMAL
UNIT RH: NORMAL
UNIT RH: NORMAL
UNIT VOLUME: 317
UNIT VOLUME: 325
XM INTEP: NORMAL
XM INTEP: NORMAL

## 2024-07-15 LAB
AUTOPSY REPORT: NORMAL
BACTERIA BLD AEROBE CULT: ABNORMAL
BACTERIA BLD CULT: ABNORMAL
GRAM STN SPEC: ABNORMAL
PATH REPORT.RELEVANT HX SPEC: NORMAL
PATH REPORT.TOTAL CANCER: NORMAL

## 2024-07-16 ENCOUNTER — DOCUMENTATION (OUTPATIENT)
Dept: CASE MANAGEMENT | Facility: HOSPITAL | Age: 24
End: 2024-07-16
Payer: MEDICAID

## 2024-07-16 LAB
LABORATORY COMMENT REPORT: NORMAL
PATH REPORT.COMMENTS IMP SPEC: NORMAL
PATH REPORT.FINAL DX SPEC: NORMAL
PATH REPORT.GROSS SPEC: NORMAL
PATH REPORT.RELEVANT HX SPEC: NORMAL
PATH REPORT.TOTAL CANCER: NORMAL

## 2024-07-16 NOTE — PROGRESS NOTES
Bereavement program update:    SW has been calling local  homes in the [facility] to request pricing for  service, casket, and burial as pt/family's request. Spoke with  Directors at [facility] on [facility], [place], [place] in additional to cemeteries ([place], [place], [facility]) to obtain pricing for family. Informed on 7/15 (Monday) that [facility] called  Mortician's Office as they met with the family and will be transferring baby into their care.     Spoke with pt's , Wilmar [phone] who confirmed that they are using [facility] and have a service setup for this Saturday. He shared that he already paid for the  in full and would like help if he can be reimbursed. Requested that he send copies of the invoice and receipt from [facility] to me so I can see if there are any nonprofits that will reimburse him. Further explained that non-profits typically pay  homes directly and do not reimburse for services already paid for by a family. Will contact [facility] and [facility] to inquire about requesting assistance to reimburse the family.    Will continue to follow for bereavement assistance.    Liz Mosher, MSSA, JAYLIN, CGP  [facility] Director of Parent Bereavement Programs  [phone]

## 2024-07-26 LAB
ELECTRONICALLY SIGNED BY CYTOGENETICS: NORMAL
MICROARRAY PLATFORM: NORMAL

## 2024-08-09 ENCOUNTER — APPOINTMENT (OUTPATIENT)
Dept: OBSTETRICS AND GYNECOLOGY | Facility: CLINIC | Age: 24
End: 2024-08-09
Payer: MEDICAID

## 2024-09-27 LAB
LABORATORY COMMENT REPORT: NORMAL
PATH REPORT.ADDENDUM SPEC: NORMAL
PATH REPORT.FINAL DX SPEC: NORMAL
PATH REPORT.GROSS SPEC: NORMAL
PATH REPORT.MICROSCOPIC SPEC OTHER STN: NORMAL
PATH REPORT.RELEVANT HX SPEC: NORMAL
PATH REPORT.RELEVANT HX SPEC: NORMAL
PATH REPORT.TOTAL CANCER: NORMAL
RPT COMMENT SECTION: NORMAL

## 2025-02-14 ENCOUNTER — HOSPITAL ENCOUNTER (OUTPATIENT)
Facility: HOSPITAL | Age: 25
End: 2025-02-14
Attending: OBSTETRICS & GYNECOLOGY | Admitting: OBSTETRICS & GYNECOLOGY
Payer: MEDICAID

## 2025-02-14 ENCOUNTER — HOSPITAL ENCOUNTER (OUTPATIENT)
Facility: HOSPITAL | Age: 25
Discharge: HOME | End: 2025-02-14
Attending: OBSTETRICS & GYNECOLOGY | Admitting: OBSTETRICS & GYNECOLOGY
Payer: MEDICAID

## 2025-02-14 ENCOUNTER — TELEPHONE (OUTPATIENT)
Dept: OBSTETRICS AND GYNECOLOGY | Facility: HOSPITAL | Age: 25
End: 2025-02-14

## 2025-02-14 VITALS
TEMPERATURE: 97.7 F | WEIGHT: 183 LBS | OXYGEN SATURATION: 99 % | RESPIRATION RATE: 18 BRPM | HEIGHT: 67 IN | HEART RATE: 102 BPM | SYSTOLIC BLOOD PRESSURE: 107 MMHG | DIASTOLIC BLOOD PRESSURE: 58 MMHG | BODY MASS INDEX: 28.72 KG/M2

## 2025-02-14 DIAGNOSIS — Z3A.28 28 WEEKS GESTATION OF PREGNANCY (HHS-HCC): Primary | ICD-10-CM

## 2025-02-14 PROBLEM — O34.219 HISTORY OF CESAREAN DELIVERY, CURRENTLY PREGNANT (HHS-HCC): Status: ACTIVE | Noted: 2023-12-12

## 2025-02-14 PROBLEM — Z98.891 HISTORY OF VBAC: Status: ACTIVE | Noted: 2024-07-22

## 2025-02-14 PROBLEM — Q63.2 PELVIC KIDNEY: Status: ACTIVE | Noted: 2024-02-05

## 2025-02-14 PROBLEM — D61.818 PANCYTOPENIA: Status: ACTIVE | Noted: 2025-02-14

## 2025-02-14 PROBLEM — B18.1 CHRONIC HEPATITIS B VIRUS INFECTION (MULTI): Chronic | Status: ACTIVE | Noted: 2023-07-19

## 2025-02-14 PROBLEM — R16.1 SPLENOMEGALY: Status: ACTIVE | Noted: 2024-05-13

## 2025-02-14 LAB
ABO GROUP (TYPE) IN BLOOD: NORMAL
ANTIBODY SCREEN: NORMAL
ERYTHROCYTE [DISTWIDTH] IN BLOOD BY AUTOMATED COUNT: 15.1 % (ref 11.5–14.5)
FERRITIN SERPL-MCNC: 13 NG/ML
FOLATE SERPL-MCNC: 11.4 NG/ML
HCT VFR BLD AUTO: 28.7 % (ref 36–46)
HCV AB SER QL: NONREACTIVE
HGB BLD-MCNC: 8.3 G/DL (ref 12–16)
IRON SATN MFR SERPL: NORMAL %
IRON SERPL-MCNC: 36 UG/DL
MCH RBC QN AUTO: 23.7 PG (ref 26–34)
MCHC RBC AUTO-ENTMCNC: 28.9 G/DL (ref 32–36)
MCV RBC AUTO: 82 FL (ref 80–100)
NRBC BLD-RTO: 0 /100 WBCS (ref 0–0)
PLATELET # BLD AUTO: 40 X10*3/UL (ref 150–450)
RBC # BLD AUTO: 3.5 X10*6/UL (ref 4–5.2)
REFLEX ADDED, ANEMIA PANEL: NORMAL
RH FACTOR (ANTIGEN D): NORMAL
RUBV IGG SERPL IA-ACNC: 2.8 IA
RUBV IGG SERPL QL IA: POSITIVE
TIBC SERPL-MCNC: NORMAL UG/DL
TREPONEMA PALLIDUM IGG+IGM AB [PRESENCE] IN SERUM OR PLASMA BY IMMUNOASSAY: NONREACTIVE
UIBC SERPL-MCNC: >450 UG/DL
VIT B12 SERPL-MCNC: 585 PG/ML
WBC # BLD AUTO: 3 X10*3/UL (ref 4.4–11.3)

## 2025-02-14 PROCEDURE — 83021 HEMOGLOBIN CHROMOTOGRAPHY: CPT | Performed by: NURSE PRACTITIONER

## 2025-02-14 PROCEDURE — 82728 ASSAY OF FERRITIN: CPT | Performed by: NURSE PRACTITIONER

## 2025-02-14 PROCEDURE — 99213 OFFICE O/P EST LOW 20 MIN: CPT | Performed by: NURSE PRACTITIONER

## 2025-02-14 PROCEDURE — 86225 DNA ANTIBODY NATIVE: CPT | Performed by: STUDENT IN AN ORGANIZED HEALTH CARE EDUCATION/TRAINING PROGRAM

## 2025-02-14 PROCEDURE — RXMED WILLOW AMBULATORY MEDICATION CHARGE

## 2025-02-14 PROCEDURE — 80053 COMPREHEN METABOLIC PANEL: CPT | Performed by: STUDENT IN AN ORGANIZED HEALTH CARE EDUCATION/TRAINING PROGRAM

## 2025-02-14 PROCEDURE — 82746 ASSAY OF FOLIC ACID SERUM: CPT | Performed by: NURSE PRACTITIONER

## 2025-02-14 PROCEDURE — 86803 HEPATITIS C AB TEST: CPT | Performed by: NURSE PRACTITIONER

## 2025-02-14 PROCEDURE — 86038 ANTINUCLEAR ANTIBODIES: CPT | Performed by: STUDENT IN AN ORGANIZED HEALTH CARE EDUCATION/TRAINING PROGRAM

## 2025-02-14 PROCEDURE — 83020 HEMOGLOBIN ELECTROPHORESIS: CPT | Performed by: PATHOLOGY

## 2025-02-14 PROCEDURE — 36415 COLL VENOUS BLD VENIPUNCTURE: CPT | Performed by: NURSE PRACTITIONER

## 2025-02-14 PROCEDURE — 85027 COMPLETE CBC AUTOMATED: CPT | Performed by: NURSE PRACTITIONER

## 2025-02-14 PROCEDURE — 36415 COLL VENOUS BLD VENIPUNCTURE: CPT

## 2025-02-14 PROCEDURE — 99214 OFFICE O/P EST MOD 30 MIN: CPT

## 2025-02-14 PROCEDURE — 86317 IMMUNOASSAY INFECTIOUS AGENT: CPT | Performed by: NURSE PRACTITIONER

## 2025-02-14 PROCEDURE — 83550 IRON BINDING TEST: CPT | Performed by: NURSE PRACTITIONER

## 2025-02-14 PROCEDURE — 87340 HEPATITIS B SURFACE AG IA: CPT | Performed by: NURSE PRACTITIONER

## 2025-02-14 PROCEDURE — 86780 TREPONEMA PALLIDUM: CPT | Performed by: NURSE PRACTITIONER

## 2025-02-14 PROCEDURE — 4500999001 HC ED NO CHARGE

## 2025-02-14 PROCEDURE — 86901 BLOOD TYPING SEROLOGIC RH(D): CPT | Performed by: NURSE PRACTITIONER

## 2025-02-14 PROCEDURE — 82607 VITAMIN B-12: CPT | Performed by: NURSE PRACTITIONER

## 2025-02-14 RX ORDER — NIFEDIPINE 10 MG/1
10 CAPSULE ORAL ONCE AS NEEDED
Status: DISCONTINUED | OUTPATIENT
Start: 2025-02-14 | End: 2025-02-14 | Stop reason: HOSPADM

## 2025-02-14 RX ORDER — LIDOCAINE HYDROCHLORIDE 10 MG/ML
0.5 INJECTION, SOLUTION INFILTRATION; PERINEURAL ONCE AS NEEDED
Status: DISCONTINUED | OUTPATIENT
Start: 2025-02-14 | End: 2025-02-14 | Stop reason: HOSPADM

## 2025-02-14 RX ORDER — LABETALOL HYDROCHLORIDE 5 MG/ML
20 INJECTION, SOLUTION INTRAVENOUS ONCE AS NEEDED
Status: DISCONTINUED | OUTPATIENT
Start: 2025-02-14 | End: 2025-02-14 | Stop reason: HOSPADM

## 2025-02-14 RX ORDER — ONDANSETRON 4 MG/1
4 TABLET, FILM COATED ORAL EVERY 6 HOURS PRN
Status: DISCONTINUED | OUTPATIENT
Start: 2025-02-14 | End: 2025-02-14 | Stop reason: HOSPADM

## 2025-02-14 RX ORDER — ONDANSETRON HYDROCHLORIDE 2 MG/ML
4 INJECTION, SOLUTION INTRAVENOUS EVERY 6 HOURS PRN
Status: DISCONTINUED | OUTPATIENT
Start: 2025-02-14 | End: 2025-02-14 | Stop reason: HOSPADM

## 2025-02-14 RX ORDER — HYDRALAZINE HYDROCHLORIDE 20 MG/ML
5 INJECTION INTRAMUSCULAR; INTRAVENOUS ONCE AS NEEDED
Status: DISCONTINUED | OUTPATIENT
Start: 2025-02-14 | End: 2025-02-14 | Stop reason: HOSPADM

## 2025-02-14 SDOH — SOCIAL STABILITY: SOCIAL INSECURITY: HAVE YOU HAD THOUGHTS OF HARMING ANYONE ELSE?: NO

## 2025-02-14 SDOH — ECONOMIC STABILITY: HOUSING INSECURITY: DO YOU FEEL UNSAFE GOING BACK TO THE PLACE WHERE YOU ARE LIVING?: NO

## 2025-02-14 SDOH — HEALTH STABILITY: MENTAL HEALTH: WISH TO BE DEAD (PAST 1 MONTH): NO

## 2025-02-14 SDOH — HEALTH STABILITY: MENTAL HEALTH: WERE YOU ABLE TO COMPLETE ALL THE BEHAVIORAL HEALTH SCREENINGS?: YES

## 2025-02-14 SDOH — SOCIAL STABILITY: SOCIAL INSECURITY: ARE THERE ANY APPARENT SIGNS OF INJURIES/BEHAVIORS THAT COULD BE RELATED TO ABUSE/NEGLECT?: NO

## 2025-02-14 SDOH — SOCIAL STABILITY: SOCIAL INSECURITY: DOES ANYONE TRY TO KEEP YOU FROM HAVING/CONTACTING OTHER FRIENDS OR DOING THINGS OUTSIDE YOUR HOME?: NO

## 2025-02-14 SDOH — SOCIAL STABILITY: SOCIAL INSECURITY: HAVE YOU HAD ANY THOUGHTS OF HARMING ANYONE ELSE?: NO

## 2025-02-14 SDOH — SOCIAL STABILITY: SOCIAL INSECURITY: HAS ANYONE EVER THREATENED TO HURT YOUR FAMILY OR YOUR PETS?: NO

## 2025-02-14 SDOH — SOCIAL STABILITY: SOCIAL INSECURITY: PHYSICAL ABUSE: DENIES

## 2025-02-14 SDOH — HEALTH STABILITY: MENTAL HEALTH: NON-SPECIFIC ACTIVE SUICIDAL THOUGHTS (PAST 1 MONTH): NO

## 2025-02-14 SDOH — HEALTH STABILITY: MENTAL HEALTH: SUICIDAL BEHAVIOR (LIFETIME): NO

## 2025-02-14 SDOH — SOCIAL STABILITY: SOCIAL INSECURITY: ARE YOU OR HAVE YOU BEEN THREATENED OR ABUSED PHYSICALLY, EMOTIONALLY, OR SEXUALLY BY ANYONE?: NO

## 2025-02-14 SDOH — SOCIAL STABILITY: SOCIAL INSECURITY: DO YOU FEEL ANYONE HAS EXPLOITED OR TAKEN ADVANTAGE OF YOU FINANCIALLY OR OF YOUR PERSONAL PROPERTY?: NO

## 2025-02-14 SDOH — SOCIAL STABILITY: SOCIAL INSECURITY: ABUSE SCREEN: ADULT

## 2025-02-14 SDOH — SOCIAL STABILITY: SOCIAL INSECURITY: VERBAL ABUSE: DENIES

## 2025-02-14 ASSESSMENT — COLUMBIA-SUICIDE SEVERITY RATING SCALE - C-SSRS
1. IN THE PAST MONTH, HAVE YOU WISHED YOU WERE DEAD OR WISHED YOU COULD GO TO SLEEP AND NOT WAKE UP?: NO
6. HAVE YOU EVER DONE ANYTHING, STARTED TO DO ANYTHING, OR PREPARED TO DO ANYTHING TO END YOUR LIFE?: NO
2. HAVE YOU ACTUALLY HAD ANY THOUGHTS OF KILLING YOURSELF?: NO

## 2025-02-14 ASSESSMENT — PATIENT HEALTH QUESTIONNAIRE - PHQ9
1. LITTLE INTEREST OR PLEASURE IN DOING THINGS: NOT AT ALL
SUM OF ALL RESPONSES TO PHQ9 QUESTIONS 1 & 2: 0
2. FEELING DOWN, DEPRESSED OR HOPELESS: NOT AT ALL

## 2025-02-14 ASSESSMENT — LIFESTYLE VARIABLES
AUDIT-C TOTAL SCORE: 0
HOW OFTEN DO YOU HAVE 6 OR MORE DRINKS ON ONE OCCASION: NEVER
HOW MANY STANDARD DRINKS CONTAINING ALCOHOL DO YOU HAVE ON A TYPICAL DAY: PATIENT DOES NOT DRINK
AUDIT-C TOTAL SCORE: 0
HOW OFTEN DO YOU HAVE A DRINK CONTAINING ALCOHOL: NEVER
SKIP TO QUESTIONS 9-10: 1

## 2025-02-14 ASSESSMENT — PAIN SCALES - GENERAL: PAINLEVEL_OUTOF10: 10 - WORST POSSIBLE PAIN

## 2025-02-14 NOTE — H&P
Triage H&P   ID: [id number]   ID: [id number]    Ramez Stoddard is a 24 y.o. year old at Unknown gestational age, who presents to triage for:    Chief Complaint   Patient presents with    Abdominal Pain    Decreased Fetal Movement        Assessment/Plan:    Decreased Fetal Movement  - good movement now in triage  - NST: , mod variability non reactive, but no decels noted;  difficult to trace d/t maternal and fetal movement; unable to obtain continuous 20 min tracing; pt declined further monitoring as she had to leave due to  issue  - discussed return precautions    No Prenatal Care  - BSUS per Dr. Lockett: BREECH,  28.2 wga by bedside biometry, subjectively normal MAURO  - Prenatal panel, T&S sent  - GC/CT, urine cx ordered but pt declined to leave specimen  - encouraged pt to follow up as scheduled    Maternal Well-being  - Vital signs stable and WNL  - Emotional support and reassurance provided  - All questions and concerns addressed    Dispo: Home with precautions. OB appt with MFM on 2/19  Plan and tracing reviewed with Dr. Chisholm.     REHANA Camarena     Medical Problems       Problem List       IUFD at 20 weeks or more of gestation    Hepatitis    Thrombocytopenia (CMS-HCC)    Chronic hepatitis B virus infection (Multi) (Chronic)    Overview Signed 2/14/2025  1:11 PM by REHANA Camarena     2. Hepatitis B + with a low viral load. Non reactive core IGM and envelope. Normal liver function. I would like to connect her to the chronic Hep B clinic at [facility] for closer monitoring. She will have labs done every 3-6 months there for surveillance. No treatment or imaging indicated now.     Per [facility] 11/2023:    Chronic hepatitis B: by labwork, appears to be an inactive carrier. Does not meet current AASLD guidelines for medication, however, there is mounting data that all patients with detectable viral load should be treated. Will get US and Fibroscan today to assess for HCC  "and fibrosis. I expect new data to be presented this weekend at the AASLD National Meeting which I will be attending. I will see her back in 4 weeks to review US/Fibroscan and likely begin treatment.         Depression affecting pregnancy, postpartum    History of  delivery, currently pregnant ([facility])    History of     Pelvic kidney    Overview Signed 2025  1:11 PM by Lizbet Wagn, APRN-CNP     [facility] u/s   \"Indication   Caesarean section. Fetal anatomy survey     Impression   The patient is here for evaluation of fetal structure. Low risk NIPT   - Anatomical survey is limited, follow up is needed   - There is a maternal left pelvic kidney   - The patient has a low vertical skin incision, unknown uterine scar   The limitations of ultrasound were discussed with the patient with an .     Ultrasound Consultation   2024 Maternal Left pelvic horseshoe kidney noted inferior to cervix.   Maternal Right renal is located in abdomen normal location.   Maternal Left renal is absent in the abdomen.     Recommendations   Follow up in 4 weeks \"         Splenomegaly         Subjective   Ramez Stoddard is a 24 y.o. year old at Unknown gestatioanl age, who presents to triage with no fetal movement since yesterday. She has not had any prenatal care. Last delivery was on 7/10/2024 and resulted in 39 wk IUFD in s/o placental abruption. She has not had a period since that delivery and believes she got pregnant in 2024. Denies vaginal bleeding, loss of fluid, contractions.     OB History          3    Para   2    Term   2            AB        Living   1         SAB        IAB        Ectopic        Multiple   0    Live Births   1                  History reviewed. No pertinent surgical history.     Social History     Socioeconomic History    Marital status: Unknown     Spouse name: Not on file    Number of children: Not on file    Years of education: Not on file    Highest education " level: Not on file   Occupational History    Not on file   Tobacco Use    Smoking status: Not on file    Smokeless tobacco: Not on file   Substance and Sexual Activity    Alcohol use: Not on file    Drug use: Not on file    Sexual activity: Not on file   Other Topics Concern    Not on file   Social History Narrative    Not on file     Social Drivers of Health     Financial Resource Strain: Not on File (7/12/2023)    Received from [facility]    Financial Resource Strain     Financial Resource Strain: 0   Food Insecurity: Not on File (9/26/2024)    Received from [facility]    Food Insecurity     Food: 0   Transportation Needs: Not on File (7/12/2023)    Received from [facility]    Transportation Needs     Transportation: 0   Physical Activity: Not on File (7/12/2023)    Received from [facility]    Physical Activity     Physical Activity: 0   Stress: Not on File (7/12/2023)    Received from [facility]    Stress     Stress: 0   Social Connections: Not on File (9/19/2024)    Received from [facility]    Social Connections     Connectedness: 0   Intimate Partner Violence: Not on file        No Known Allergies     No medications prior to admission.        Objective     Visit Vitals  /58   Pulse 102   Temp 36.5 °C (97.7 °F)   Resp 18      Physical Exam  Physical Exam  Constitutional:       Appearance: Normal appearance.   HENT:      Mouth/Throat:      Mouth: Mucous membranes are moist.   Cardiovascular:      Rate and Rhythm: Normal rate.   Pulmonary:      Effort: Pulmonary effort is normal.   Abdominal:      General: There is no distension.      Palpations: Abdomen is soft.      Tenderness: There is no abdominal tenderness.   Musculoskeletal:         General: Normal range of motion.      Cervical back: Normal range of motion.   Skin:     General: Skin is warm and dry.   Neurological:      Mental Status: She is alert and oriented to person, place, and time.   Psychiatric:         Behavior: Behavior normal.      Labs  Labs in chart were reviewed.

## 2025-02-14 NOTE — PROGRESS NOTES
"2025   Ramez Stoddard     [facility] CONSULT NOTE  Referring Clinician: Chrissie Cruz [facility]  Reason for consult: Hx of IUFD & pancytopenia    HPI: Ramez Stoddard is a 24 y.o.  at 29w0d here for consult for above. A [employer]  was used for the entirety of the visit.     She was seen in triage on  for decreased fetal movement and abdominal pain. At that evaluation, a bedside ultrasound was performed and she was thought to be 28wks. MAURO at that time was thought to be \"subjectively normal.\" She was unable to be continuous monitored and left prior to NST being fully obtained, though fetal heart rate was noted be normal at 145bpm. She was discharged after lab draw and prior to labs resulting. Upon review of her blood work, she was found to be pancytopenic as below with WBC of 3.0, Hgb of 8.3, and Plt of 40. The patient was attempted to be called for repeat evaluation in the hospital though did not present. Blood work was also significant for Hep B Ag positivity for which she has a history.     She was last seen for her Hep B infection by ID in 2023 at which point it was consistent with a chronic infection and she denied ever having been treated for Hep B in the past. Her last Hep B labs were in 2023 showing HBV DNA of 168 international units/mL (2.23 log international units/mL) & negative HBVe antigen.     She has a history of thrombocytopenia. The oldest labs available for review are from 2023 with WBC of 3.2, Hgb WNL at 12.7, Plt of 118 and differential showing low monocytes of 150. The lowest her WBC has ever gotten is 2.9. During her last pregnancy, WBC and Hgb were noted to be WNL and stable though thrombocytopenia worsened and nadired down to 70. She was then referred to and seen by hematology at [facility]. This prompted an abdominal US that was performed in 2024 and showed normal RUQ with splenomegaly measuring 16.9cm. She was supposed to follow up " "with hematology though did not. There was concern that her chronic Hep B infection was contributing to the thrombocytopenia, though other causes were not ruled out.     She then presented to  L&D for contractions in 2024 and was found to have an IUFD at 39.3wks that was complicated by a placental abruption.     History otherwise significant for hx of Cdx1 in [place] because her baby was \"too big\" and pelvic vs horseshoe kidney.     Patient reports she is feeling well. She states she has had some nausea and vomiting and will typically throw up once a day. She has been able to keep food down. She does report that she has lost about 4kg of weight since the start of pregnancy (about 8 pounds). She denies any fatigue or pain. She is feeling fetal movement. She denies LOF or VB. She and her  both deny knowing about the lab results showing pancytopenia from last Friday.     Other pregnancy complications include   Patient Active Problem List   Diagnosis    Thrombocytopenia affecting pregnancy, antepartum (Multi)    Hepatitis B complicating pregnancy in second trimester ([facility])    Depression affecting pregnancy, postpartum    History of  delivery, currently pregnant ([facility])    History of     Pelvic kidney    Splenomegaly    Pancytopenia    History of IUFD    28 weeks gestation of pregnancy ([facility])     10 point review of system is negative except as above    OB History          3    Para   2    Term   2            AB        Living   1         SAB        IAB        Ectopic        Multiple   0    Live Births   1                   Past medical history: Denies HTN, DM, asthma, depression, or thyroid issues    Past Surgical History:   Procedure Laterality Date     SECTION, LOW TRANSVERSE         Medications:   No current facility-administered medications on file prior to visit.     Current Outpatient Medications on File Prior to Visit   Medication Sig Dispense Refill    prenatal " vit,calc76-iron-folic (Prenatabs Rx) 29 mg iron- 1 mg tablet Take 1 tablet by mouth once daily. 30 tablet 2         No Known Allergies    Social History     Tobacco Use    Smoking status: Never    Smokeless tobacco: Never   Vaping Use    Vaping status: Never Used   Substance Use Topics    Alcohol use: Never    Drug use: Never       family history is not on file.      OBJECTIVE  Visit Vitals  /68   Pulse 98   Wt 79.5 kg (175 lb 4.8 oz)   BMI 27.46 kg/m²   OB Status Pregnant   Smoking Status Never   BSA 1.94 m²       Physical exam  General - in no acute distress, resting comfortably  CV - regular rate   Resp - non labored on room air, normal effort   Abd - gravid, soft, no obvious masses   Skin - no rashes   FHT: obtained on US today      Component      Latest Ref [facility] 2/14/2025   WBC      4.4 - 11.3 x10*3/uL 3.0 (L)    nRBC      0.0 - 0.0 /100 WBCs 0.0    RBC      4.00 - 5.20 x10*6/uL 3.50 (L)    HEMOGLOBIN      12.0 - 16.0 g/dL 8.3 (L)    HEMATOCRIT      36.0 - 46.0 % 28.7 (L)    MCV      80 - 100 fL 82    MCH      26.0 - 34.0 pg 23.7 (L)    MCHC      32.0 - 36.0 g/dL 28.9 (L)    RED CELL DISTRIBUTION WIDTH      11.5 - 14.5 % 15.1 (H)    Platelets      150 - 450 x10*3/uL 40 (LL)       Component      Latest Ref [facility] 2/14/2025   KELLI      Negative  Positive !    KELLI Pattern Speckled    KELLI Titer 1:320       Component      Latest Ref [facility] 2/14/2025   Anti-DNA (DS)      <5.0 IU/mL <1.0        Component      Latest Ref [facility] 2/14/2025   GLUCOSE      74 - 99 mg/dL 70 (L)    SODIUM      136 - 145 mmol/L 143    POTASSIUM      3.5 - 5.3 mmol/L 4.4    CHLORIDE      98 - 107 mmol/L 114 (H)    Bicarbonate      21 - 32 mmol/L 13 (L)    Anion Gap      10 - 20 mmol/L 20    Blood Urea Nitrogen      6 - 23 mg/dL 10    Creatinine      0.50 - 1.05 mg/dL 0.38 (L)    EGFR      >60 mL/min/1.73m*2 >90    Calcium      8.6 - 10.6 mg/dL 9.1    Albumin      3.4 - 5.0 g/dL 3.7    Alkaline Phosphatase      33 - 110 U/L 59    Total Protein       6.4 - 8.2 g/dL 6.7    AST      9 - 39 U/L 74 (H)    Bilirubin Total      0.0 - 1.2 mg/dL 1.3 (H)    ALT      7 - 45 U/L 37         ASSESSMENT & PLAN    Ramez Stoddard is a 24 y.o.  at 29w0d here for the following:    Assessment & Plan  Hepatitis B complicating pregnancy in second trimester ([facility])  Counseling:   Women with chronic Hepatitis B , which is consistent with her latest blood work from 2023, do well in pregnancy, but occasionally can have flares during pregnancy and in the postpartum period. In general, Hepatitis B has not been found to influence maternal gestational diabetes or have an impact on the fetus. The risk of passing along the infection to the fetus depends on the level of virus detected in the maternal system. When viral load is >200,000 international units/mL, then treatment is recommended to decrease vertical transmission rates. In babies of Hep B positive mothers, [facility] recommends administration of HIB in conjunction with Hep B vaccination.    Recommendations:   -HBV load & HBeAg to be ordered today. Follow up. If viral load is <200,000IU/mL, then low risk for vertical transmission and no indication for anti-viral treatment  -LFTs every 3 months  -Referral to hepatology to be made for outpatient management.  -Mode of delivery per obstetric indication  -Notify peds at delivery  -Infant to receive Hepatitis B vaccine series within 72 hours of delivery  -Infant to receive Hepatitis B immune globulin at birth    History of IUFD  Counseling:   Ramez Stoddard has a history of a 39 week stillbirth.  Prior pregnancy evaluations revealed likely cause of IUFD was placental abruption and cord accident. We discussed risk factors for stillbirth including non- black race, nulliparity, extremes of maternal age, obesity, preexisting diabetes, chronic hypertension, tobacco and/or alcohol use, multiple gestations, assisted reproductive technology, and prior obstetric history, among  "others.  A history of prior stillbirth is known to increase the risk for recurrence; however, the reported magnitude of this risk varies with a reported range of 9-20 per 1,000 births.  The likelihood of recurrence is in part dependent upon the etiology of a preceding stillbirth; therefore, stillbirth recommendations include a number of tests to evaluate for etiologies such as genetic conditions, structural malformations, congenital infections or chorioamnionitis, growth restriction, placental abruption, umbilical cord events, fetal-maternal hemorrhage, and maternal medical conditions such as antiphospholipid antibody syndrome, substance use, and diabetes.    Recommendations:  - Patient is interested in genetics screening   - Serial growth ultrasounds.   - Weekly  testing at 32 weeks of gestation.   - Induction of labor at 39 weeks.  - Consideration may be granted to an early term delivery at 37-39 weeks using shared-decision after a review of the associated risks benefits.     History of  delivery, currently pregnant ([facility])  -unknown uterine incision, performed in [facility] in  for baby being \"too big\"   -s/p  for IUFD in    -[place]ires    -[facility]  score 94.5%  Pancytopenia  Counseling:   Ramez Stoddard was found to have pancytopenia with low WBC, Hgb, and platelets most recently on . She has previously had a history of low WBC count (divya to 2.9) and thrombocytopenia (divya to 70 in pregnancy). Her WBC was found to be 3.0 and platelets of 40 most recently. Differential was unable to be performed. We discussed the implications of pancytopenia in pregnancy, including increased risk of low birth weight,  delivery, postpartum hemorrhage, preeclampsia, and FGR. Importantly, we discussed the need to expedite a work up and make hematology aware. In order to do this in a timely fashion, I discussed with her admission to the hospital during which time the MFM team would be her " primary provider and hematology and hepatology would be consulted for further workup of lab abnormalities. She is agreeable to work up and admission.    Recommednations:   -Transportation was set up for the patient to be taken to [facility] [place] [facility] for further work up    28 weeks gestation of pregnancy ([facility])  -discussed genetic screening and patient interested, will obtain while inpatient   -Tdap today   -EFW today of 1341g (42%)  -dating based on 28 week bedside ultrasound  -Due for 1hr gtt - will obtain inpatient   -CBC & syphilis collected on 2/14 in triage. Syphilis negative, CBC showing pancytopenia, see separate problem  Polyhydramnios in third trimester complication, fetus 1 of multiple gestation ([facility]-HCC)  -MAURO of 26 on imaging today, mild category  -anatomy was incomplete though no malformations seen   -due for 1hr gtt       Patient was sent to [facility] [place] and work up for pancytopenia.    Patient was seen and evaluated with Dr. Dickey.     Lisa Mcgarry MD   Maternal Fetal Medicine

## 2025-02-14 NOTE — TELEPHONE ENCOUNTER
Spoke with pt's partner via phone with use of  (ID [id number]). Partner was not with pt at time of phone call. She does not have a separate phone number to call.    Partner informed that pt's lab work is abnormal and needs immediate re-evaluation. Partner states they are not able to come back to the hospital today but will come tomorrow morning.     Reinforced importance of follow up ASAP and pt's partner verbalized understanding. Partner encouraged to bring pt to hospital sooner than tomorrow if circumstances change.     Lizbet Wang, APRN-CNP

## 2025-02-14 NOTE — ED TRIAGE NOTES
Pt presents to the ED after she is having abdominal pain for the past 2 days. Pt is 5 months pregnant and . Pt states she is concerned because she hasn't been feeling the baby kick as often and just wants to make sure the baby is ok.

## 2025-02-15 DIAGNOSIS — D61.818 PANCYTOPENIA: Primary | ICD-10-CM

## 2025-02-15 PROBLEM — B19.10: Status: ACTIVE | Noted: 2023-07-19

## 2025-02-15 PROBLEM — O98.412: Status: ACTIVE | Noted: 2023-07-19

## 2025-02-15 PROBLEM — Z3A.28 28 WEEKS GESTATION OF PREGNANCY (HHS-HCC): Status: ACTIVE | Noted: 2025-02-15

## 2025-02-15 PROBLEM — O99.119 THROMBOCYTOPENIA AFFECTING PREGNANCY, ANTEPARTUM (MULTI): Status: ACTIVE | Noted: 2024-07-10

## 2025-02-15 PROBLEM — O36.4XX0 IUFD AT 20 WEEKS OR MORE OF GESTATION: Status: RESOLVED | Noted: 2024-07-10 | Resolved: 2025-02-15

## 2025-02-15 PROBLEM — Z87.59 HISTORY OF IUFD: Status: ACTIVE | Noted: 2025-02-15

## 2025-02-15 PROBLEM — K75.9 HEPATITIS: Status: RESOLVED | Noted: 2024-07-10 | Resolved: 2025-02-15

## 2025-02-15 LAB
ALBUMIN SERPL BCP-MCNC: 3.7 G/DL (ref 3.4–5)
ALP SERPL-CCNC: 59 U/L (ref 33–110)
ALT SERPL W P-5'-P-CCNC: 37 U/L (ref 7–45)
ANION GAP SERPL CALC-SCNC: 20 MMOL/L (ref 10–20)
AST SERPL W P-5'-P-CCNC: 74 U/L (ref 9–39)
BILIRUB SERPL-MCNC: 1.3 MG/DL (ref 0–1.2)
BUN SERPL-MCNC: 10 MG/DL (ref 6–23)
CALCIUM SERPL-MCNC: 9.1 MG/DL (ref 8.6–10.6)
CHLORIDE SERPL-SCNC: 114 MMOL/L (ref 98–107)
CO2 SERPL-SCNC: 13 MMOL/L (ref 21–32)
CREAT SERPL-MCNC: 0.38 MG/DL (ref 0.5–1.05)
DSDNA AB SER-ACNC: <1 IU/ML
EGFRCR SERPLBLD CKD-EPI 2021: >90 ML/MIN/1.73M*2
GLUCOSE SERPL-MCNC: 70 MG/DL (ref 74–99)
HBV SURFACE AG SERPL QL IA: REACTIVE
POTASSIUM SERPL-SCNC: 4.4 MMOL/L (ref 3.5–5.3)
PROT SERPL-MCNC: 6.7 G/DL (ref 6.4–8.2)
SODIUM SERPL-SCNC: 143 MMOL/L (ref 136–145)

## 2025-02-15 NOTE — ASSESSMENT & PLAN NOTE
Counseling:   Women with chronic Hepatitis B , which is consistent with her latest blood work from August 2023, do well in pregnancy, but occasionally can have flares during pregnancy and in the postpartum period. In general, Hepatitis B has not been found to influence maternal gestational diabetes or have an impact on the fetus. The risk of passing along the infection to the fetus depends on the level of virus detected in the maternal system. When viral load is >200,000 international units/mL, then treatment is recommended to decrease vertical transmission rates. In babies of Hep B positive mothers, [facility] recommends administration of HIB in conjunction with Hep B vaccination.    Recommendations:   -HBV load & HBeAg to be ordered today. Follow up. If viral load is <200,000IU/mL, then low risk for vertical transmission and no indication for anti-viral treatment  -LFTs every 3 months  -Referral to hepatology to be made for outpatient management.  -Mode of delivery per obstetric indication  -Notify peds at delivery  -Infant to receive Hepatitis B vaccine series within 72 hours of delivery  -Infant to receive Hepatitis B immune globulin at birth

## 2025-02-15 NOTE — ASSESSMENT & PLAN NOTE
Counseling:   Ramez Stoddard has a history of a 39 week stillbirth.  Prior pregnancy evaluations revealed likely cause of IUFD was placental abruption and cord accident. We discussed risk factors for stillbirth including non- black race, nulliparity, extremes of maternal age, obesity, preexisting diabetes, chronic hypertension, tobacco and/or alcohol use, multiple gestations, assisted reproductive technology, and prior obstetric history, among others.  A history of prior stillbirth is known to increase the risk for recurrence; however, the reported magnitude of this risk varies with a reported range of 9-20 per 1,000 births.  The likelihood of recurrence is in part dependent upon the etiology of a preceding stillbirth; therefore, stillbirth recommendations include a number of tests to evaluate for etiologies such as genetic conditions, structural malformations, congenital infections or chorioamnionitis, growth restriction, placental abruption, umbilical cord events, fetal-maternal hemorrhage, and maternal medical conditions such as antiphospholipid antibody syndrome, substance use, and diabetes.    Recommendations:  - Patient is interested in genetics screening   - Serial growth ultrasounds.   - Weekly  testing at 32 weeks of gestation.   - Induction of labor at 39 weeks.  - Consideration may be granted to an early term delivery at 37-39 weeks using shared-decision after a review of the associated risks benefits.

## 2025-02-15 NOTE — ASSESSMENT & PLAN NOTE
"-unknown uterine incision, performed in [facility] in  for baby being \"too big\"   -s/p  for IUFD in    -Desires    -[facility]  score 94.5%  "

## 2025-02-15 NOTE — ASSESSMENT & PLAN NOTE
-discussed genetic screening and patient interested, will obtain while inpatient   -Tdap today   -EFW today of 1341g (42%)  -dating based on 28 week bedside ultrasound  -Due for 1hr gtt - will obtain inpatient   -CBC & syphilis collected on 2/14 in triage. Syphilis negative, CBC showing pancytopenia, see separate problem

## 2025-02-15 NOTE — ASSESSMENT & PLAN NOTE
Counseling:   Ramez Stoddard was found to have pancytopenia with low WBC, Hgb, and platelets most recently on . She has previously had a history of low WBC count (divya to 2.9) and thrombocytopenia (divya to 70 in pregnancy). Her WBC was found to be 3.0 and platelets of 40 most recently. Differential was unable to be performed. We discussed the implications of pancytopenia in pregnancy, including increased risk of low birth weight,  delivery, postpartum hemorrhage, preeclampsia, and FGR. Importantly, we discussed the need to expedite a work up and make hematology aware. In order to do this in a timely fashion, I discussed with her admission to the hospital during which time the [facility] team would be her primary provider and hematology and hepatology would be consulted for further workup of lab abnormalities. She is agreeable to work up and admission.    Recommednations:   -Transportation was set up for the patient to be taken to [facility] for direct admission to [facility] for further work up

## 2025-02-18 LAB
ANA PATTERN: ABNORMAL
ANA SER QL HEP2 SUBST: POSITIVE
ANA TITR SER IF: ABNORMAL {TITER}
HEMOGLOBIN A2: 2.5 % (ref 2–3.5)
HEMOGLOBIN A: 96.4 % (ref 95.8–98)
HEMOGLOBIN F: 1.1 % (ref 0–2)
HEMOGLOBIN IDENTIFICATION INTERPRETATION: NORMAL
PATH REVIEW-HGB IDENTIFICATION: NORMAL

## 2025-02-19 ENCOUNTER — HOSPITAL ENCOUNTER (OUTPATIENT)
Dept: RADIOLOGY | Facility: CLINIC | Age: 25
Discharge: HOME | End: 2025-02-19
Payer: MEDICAID

## 2025-02-19 ENCOUNTER — INITIAL PRENATAL (OUTPATIENT)
Dept: MATERNAL FETAL MEDICINE | Facility: CLINIC | Age: 25
End: 2025-02-19
Payer: MEDICAID

## 2025-02-19 ENCOUNTER — HOSPITAL ENCOUNTER (OUTPATIENT)
Facility: HOSPITAL | Age: 25
Setting detail: OBSERVATION
Discharge: HOME | End: 2025-02-19
Attending: STUDENT IN AN ORGANIZED HEALTH CARE EDUCATION/TRAINING PROGRAM | Admitting: OBSTETRICS & GYNECOLOGY
Payer: MEDICAID

## 2025-02-19 ENCOUNTER — PROCEDURE VISIT (OUTPATIENT)
Dept: OBSTETRICS AND GYNECOLOGY | Facility: CLINIC | Age: 25
End: 2025-02-19
Payer: MEDICAID

## 2025-02-19 VITALS
WEIGHT: 175.3 LBS | BODY MASS INDEX: 27.46 KG/M2 | SYSTOLIC BLOOD PRESSURE: 106 MMHG | DIASTOLIC BLOOD PRESSURE: 68 MMHG | HEART RATE: 98 BPM

## 2025-02-19 VITALS
DIASTOLIC BLOOD PRESSURE: 66 MMHG | TEMPERATURE: 97.7 F | HEART RATE: 96 BPM | HEIGHT: 67 IN | OXYGEN SATURATION: 100 % | SYSTOLIC BLOOD PRESSURE: 102 MMHG | RESPIRATION RATE: 16 BRPM | BODY MASS INDEX: 26.78 KG/M2 | WEIGHT: 170.64 LBS

## 2025-02-19 DIAGNOSIS — Z03.73 FETAL ANOMALY SUSPECTED BUT NOT FOUND: ICD-10-CM

## 2025-02-19 DIAGNOSIS — O40.3XX1 POLYHYDRAMNIOS IN THIRD TRIMESTER COMPLICATION, FETUS 1 OF MULTIPLE GESTATION (HHS-HCC): ICD-10-CM

## 2025-02-19 DIAGNOSIS — Z3A.01 LESS THAN 8 WEEKS GESTATION OF PREGNANCY (HHS-HCC): ICD-10-CM

## 2025-02-19 DIAGNOSIS — O40.3XX0 POLYHYDRAMNIOS AFFECTING PREGNANCY IN THIRD TRIMESTER (HHS-HCC): ICD-10-CM

## 2025-02-19 DIAGNOSIS — Z71.85 VACCINE COUNSELING: ICD-10-CM

## 2025-02-19 DIAGNOSIS — B19.10: ICD-10-CM

## 2025-02-19 DIAGNOSIS — O28.9 ABNORMAL ANTENATAL TEST: ICD-10-CM

## 2025-02-19 DIAGNOSIS — D69.6 THROMBOCYTOPENIA AFFECTING PREGNANCY, ANTEPARTUM (MULTI): ICD-10-CM

## 2025-02-19 DIAGNOSIS — O98.412: ICD-10-CM

## 2025-02-19 DIAGNOSIS — Z87.59 HISTORY OF IUFD: ICD-10-CM

## 2025-02-19 DIAGNOSIS — D61.818 PANCYTOPENIA: Primary | ICD-10-CM

## 2025-02-19 DIAGNOSIS — O09.293 SUPERVISION OF PREGNANCY WITH OTHER POOR REPRODUCTIVE OR OBSTETRIC HISTORY, THIRD TRIMESTER: ICD-10-CM

## 2025-02-19 DIAGNOSIS — O99.119 THROMBOCYTOPENIA AFFECTING PREGNANCY, ANTEPARTUM (MULTI): ICD-10-CM

## 2025-02-19 DIAGNOSIS — Z3A.28 28 WEEKS GESTATION OF PREGNANCY (HHS-HCC): ICD-10-CM

## 2025-02-19 DIAGNOSIS — O34.219 HISTORY OF CESAREAN DELIVERY, CURRENTLY PREGNANT (HHS-HCC): ICD-10-CM

## 2025-02-19 PROBLEM — Z3A.29 29 WEEKS GESTATION OF PREGNANCY (HHS-HCC): Status: ACTIVE | Noted: 2025-02-15

## 2025-02-19 LAB
ABO GROUP (TYPE) IN BLOOD: NORMAL
ALBUMIN SERPL BCP-MCNC: 3.3 G/DL (ref 3.4–5)
ALP SERPL-CCNC: 56 U/L (ref 33–110)
ALT SERPL W P-5'-P-CCNC: 23 U/L (ref 7–45)
ANION GAP SERPL CALC-SCNC: 11 MMOL/L (ref 10–20)
ANTIBODY SCREEN: NORMAL
APTT PPP: 27 SECONDS (ref 27–38)
AST SERPL W P-5'-P-CCNC: 38 U/L (ref 9–39)
B2 GLYCOPROT1 IGA SER-ACNC: 0.7 U/ML
B2 GLYCOPROT1 IGG SER-ACNC: 1.6 U/ML
B2 GLYCOPROT1 IGM SER-ACNC: 1.7 U/ML
BASOPHILS # BLD AUTO: 0.01 X10*3/UL (ref 0–0.1)
BASOPHILS NFR BLD AUTO: 0.3 %
BILIRUB DIRECT SERPL-MCNC: 0.2 MG/DL (ref 0–0.3)
BILIRUB SERPL-MCNC: 1.3 MG/DL (ref 0–1.2)
BUN SERPL-MCNC: 8 MG/DL (ref 6–23)
CALCIUM SERPL-MCNC: 8.5 MG/DL (ref 8.6–10.6)
CARDIOLIPIN IGA SERPL-ACNC: 0.8 APL U/ML
CARDIOLIPIN IGG SER IA-ACNC: <1.6 GPL U/ML
CARDIOLIPIN IGM SER IA-ACNC: 0.2 MPL U/ML
CHLORIDE SERPL-SCNC: 108 MMOL/L (ref 98–107)
CO2 SERPL-SCNC: 21 MMOL/L (ref 21–32)
CREAT SERPL-MCNC: 0.39 MG/DL (ref 0.5–1.05)
EGFRCR SERPLBLD CKD-EPI 2021: >90 ML/MIN/1.73M*2
EOSINOPHIL # BLD AUTO: 0.11 X10*3/UL (ref 0–0.7)
EOSINOPHIL NFR BLD AUTO: 3.8 %
ERYTHROCYTE [DISTWIDTH] IN BLOOD BY AUTOMATED COUNT: 14.9 % (ref 11.5–14.5)
FIBRINOGEN PPP-MCNC: 242 MG/DL (ref 200–400)
GLUCOSE SERPL-MCNC: 84 MG/DL (ref 74–99)
HAPTOGLOB SERPL NEPH-MCNC: <30 MG/DL (ref 30–200)
HCT VFR BLD AUTO: 24.1 % (ref 36–46)
HGB BLD-MCNC: 7.3 G/DL (ref 12–16)
HGB RETIC QN: 19 PG (ref 28–38)
HIV 1+2 AB+HIV1 P24 AG SERPL QL IA: NONREACTIVE
IMM GRANULOCYTES # BLD AUTO: 0.02 X10*3/UL (ref 0–0.7)
IMM GRANULOCYTES NFR BLD AUTO: 0.7 % (ref 0–0.9)
IMMATURE RETIC FRACTION: 23 %
INR PPP: 1.1 (ref 0.9–1.1)
LDH SERPL L TO P-CCNC: 167 U/L (ref 84–246)
LYMPHOCYTES # BLD AUTO: 0.64 X10*3/UL (ref 1.2–4.8)
LYMPHOCYTES NFR BLD AUTO: 21.9 %
MCH RBC QN AUTO: 23.3 PG (ref 26–34)
MCHC RBC AUTO-ENTMCNC: 30.3 G/DL (ref 32–36)
MCV RBC AUTO: 77 FL (ref 80–100)
MONOCYTES # BLD AUTO: 0.15 X10*3/UL (ref 0.1–1)
MONOCYTES NFR BLD AUTO: 5.1 %
NEUTROPHILS # BLD AUTO: 1.99 X10*3/UL (ref 1.2–7.7)
NEUTROPHILS NFR BLD AUTO: 68.2 %
NRBC BLD-RTO: 0 /100 WBCS (ref 0–0)
PLATELET # BLD AUTO: 67 X10*3/UL (ref 150–450)
POTASSIUM SERPL-SCNC: 3.5 MMOL/L (ref 3.5–5.3)
PROT SERPL-MCNC: 6.3 G/DL (ref 6.4–8.2)
PROTHROMBIN TIME: 12.1 SECONDS (ref 9.8–12.8)
RBC # BLD AUTO: 3.13 X10*6/UL (ref 4–5.2)
RETICS #: 0.07 X10*6/UL (ref 0.02–0.08)
RETICS/RBC NFR AUTO: 2.2 % (ref 0.5–2)
RH FACTOR (ANTIGEN D): NORMAL
SODIUM SERPL-SCNC: 136 MMOL/L (ref 136–145)
URATE SERPL-MCNC: 3.4 MG/DL (ref 2.3–6.7)
WBC # BLD AUTO: 2.9 X10*3/UL (ref 4.4–11.3)

## 2025-02-19 PROCEDURE — 80053 COMPREHEN METABOLIC PANEL: CPT

## 2025-02-19 PROCEDURE — 87389 HIV-1 AG W/HIV-1&-2 AB AG IA: CPT

## 2025-02-19 PROCEDURE — 85384 FIBRINOGEN ACTIVITY: CPT

## 2025-02-19 PROCEDURE — 85045 AUTOMATED RETICULOCYTE COUNT: CPT

## 2025-02-19 PROCEDURE — 1220000001 HC OB SEMI-PRIVATE ROOM DAILY

## 2025-02-19 PROCEDURE — G0378 HOSPITAL OBSERVATION PER HR: HCPCS

## 2025-02-19 PROCEDURE — 76819 FETAL BIOPHYS PROFIL W/O NST: CPT

## 2025-02-19 PROCEDURE — 83010 ASSAY OF HAPTOGLOBIN QUANT: CPT

## 2025-02-19 PROCEDURE — 85025 COMPLETE CBC W/AUTO DIFF WBC: CPT

## 2025-02-19 PROCEDURE — 59025 FETAL NON-STRESS TEST: CPT

## 2025-02-19 PROCEDURE — 87350 HEPATITIS BE AG IA: CPT

## 2025-02-19 PROCEDURE — 83615 LACTATE (LD) (LDH) ENZYME: CPT

## 2025-02-19 PROCEDURE — 59025 FETAL NON-STRESS TEST: CPT | Mod: GC

## 2025-02-19 PROCEDURE — 86663 EPSTEIN-BARR ANTIBODY: CPT

## 2025-02-19 PROCEDURE — 90715 TDAP VACCINE 7 YRS/> IM: CPT | Performed by: STUDENT IN AN ORGANIZED HEALTH CARE EDUCATION/TRAINING PROGRAM

## 2025-02-19 PROCEDURE — 84550 ASSAY OF BLOOD/URIC ACID: CPT

## 2025-02-19 PROCEDURE — 86146 BETA-2 GLYCOPROTEIN ANTIBODY: CPT

## 2025-02-19 PROCEDURE — 99222 1ST HOSP IP/OBS MODERATE 55: CPT

## 2025-02-19 PROCEDURE — 36415 COLL VENOUS BLD VENIPUNCTURE: CPT

## 2025-02-19 PROCEDURE — 76811 OB US DETAILED SNGL FETUS: CPT

## 2025-02-19 PROCEDURE — 82248 BILIRUBIN DIRECT: CPT

## 2025-02-19 PROCEDURE — 87517 HEPATITIS B DNA QUANT: CPT

## 2025-02-19 PROCEDURE — 85613 RUSSELL VIPER VENOM DILUTED: CPT

## 2025-02-19 PROCEDURE — 86901 BLOOD TYPING SEROLOGIC RH(D): CPT

## 2025-02-19 PROCEDURE — 85730 THROMBOPLASTIN TIME PARTIAL: CPT

## 2025-02-19 PROCEDURE — 99215 OFFICE O/P EST HI 40 MIN: CPT | Mod: 25,GC | Performed by: STUDENT IN AN ORGANIZED HEALTH CARE EDUCATION/TRAINING PROGRAM

## 2025-02-19 PROCEDURE — 86147 CARDIOLIPIN ANTIBODY EA IG: CPT

## 2025-02-19 RX ORDER — LABETALOL HYDROCHLORIDE 5 MG/ML
20 INJECTION, SOLUTION INTRAVENOUS ONCE AS NEEDED
Status: DISCONTINUED | OUTPATIENT
Start: 2025-02-19 | End: 2025-02-20 | Stop reason: HOSPADM

## 2025-02-19 RX ORDER — ADHESIVE BANDAGE
10 BANDAGE TOPICAL
Status: DISCONTINUED | OUTPATIENT
Start: 2025-02-19 | End: 2025-02-20 | Stop reason: HOSPADM

## 2025-02-19 RX ORDER — HYDRALAZINE HYDROCHLORIDE 20 MG/ML
5 INJECTION INTRAMUSCULAR; INTRAVENOUS ONCE AS NEEDED
Status: DISCONTINUED | OUTPATIENT
Start: 2025-02-19 | End: 2025-02-20 | Stop reason: HOSPADM

## 2025-02-19 RX ORDER — DIPHENHYDRAMINE HCL 25 MG
25 CAPSULE ORAL EVERY 6 HOURS PRN
Status: DISCONTINUED | OUTPATIENT
Start: 2025-02-19 | End: 2025-02-20 | Stop reason: HOSPADM

## 2025-02-19 RX ORDER — POLYETHYLENE GLYCOL 3350 17 G/17G
17 POWDER, FOR SOLUTION ORAL 2 TIMES DAILY PRN
Status: DISCONTINUED | OUTPATIENT
Start: 2025-02-19 | End: 2025-02-20 | Stop reason: HOSPADM

## 2025-02-19 RX ORDER — SODIUM CHLORIDE, SODIUM LACTATE, POTASSIUM CHLORIDE, CALCIUM CHLORIDE 600; 310; 30; 20 MG/100ML; MG/100ML; MG/100ML; MG/100ML
75 INJECTION, SOLUTION INTRAVENOUS CONTINUOUS
Status: DISCONTINUED | OUTPATIENT
Start: 2025-02-19 | End: 2025-02-20 | Stop reason: HOSPADM

## 2025-02-19 RX ORDER — NIFEDIPINE 10 MG/1
10 CAPSULE ORAL ONCE AS NEEDED
Status: DISCONTINUED | OUTPATIENT
Start: 2025-02-19 | End: 2025-02-20 | Stop reason: HOSPADM

## 2025-02-19 RX ORDER — ONDANSETRON HYDROCHLORIDE 2 MG/ML
4 INJECTION, SOLUTION INTRAVENOUS EVERY 6 HOURS PRN
Status: DISCONTINUED | OUTPATIENT
Start: 2025-02-19 | End: 2025-02-20 | Stop reason: HOSPADM

## 2025-02-19 RX ORDER — SIMETHICONE 80 MG
80 TABLET,CHEWABLE ORAL 4 TIMES DAILY PRN
Status: DISCONTINUED | OUTPATIENT
Start: 2025-02-19 | End: 2025-02-20 | Stop reason: HOSPADM

## 2025-02-19 RX ORDER — ONDANSETRON 4 MG/1
4 TABLET, FILM COATED ORAL EVERY 6 HOURS PRN
Status: DISCONTINUED | OUTPATIENT
Start: 2025-02-19 | End: 2025-02-20 | Stop reason: HOSPADM

## 2025-02-19 RX ORDER — LIDOCAINE HYDROCHLORIDE 10 MG/ML
0.5 INJECTION, SOLUTION INFILTRATION; PERINEURAL ONCE AS NEEDED
Status: DISCONTINUED | OUTPATIENT
Start: 2025-02-19 | End: 2025-02-20 | Stop reason: HOSPADM

## 2025-02-19 SDOH — SOCIAL STABILITY: SOCIAL INSECURITY: PHYSICAL ABUSE: DENIES

## 2025-02-19 SDOH — SOCIAL STABILITY: SOCIAL INSECURITY: WITHIN THE LAST YEAR, HAVE YOU BEEN HUMILIATED OR EMOTIONALLY ABUSED IN OTHER WAYS BY YOUR PARTNER OR EX-PARTNER?: NO

## 2025-02-19 SDOH — ECONOMIC STABILITY: FOOD INSECURITY: HOW HARD IS IT FOR YOU TO PAY FOR THE VERY BASICS LIKE FOOD, HOUSING, MEDICAL CARE, AND HEATING?: NOT VERY HARD

## 2025-02-19 SDOH — SOCIAL STABILITY: SOCIAL INSECURITY: VERBAL ABUSE: DENIES

## 2025-02-19 SDOH — HEALTH STABILITY: MENTAL HEALTH: NON-SPECIFIC ACTIVE SUICIDAL THOUGHTS (PAST 1 MONTH): NO

## 2025-02-19 SDOH — SOCIAL STABILITY: SOCIAL INSECURITY: ARE THERE ANY APPARENT SIGNS OF INJURIES/BEHAVIORS THAT COULD BE RELATED TO ABUSE/NEGLECT?: NO

## 2025-02-19 SDOH — SOCIAL STABILITY: SOCIAL INSECURITY: DO YOU FEEL ANYONE HAS EXPLOITED OR TAKEN ADVANTAGE OF YOU FINANCIALLY OR OF YOUR PERSONAL PROPERTY?: NO

## 2025-02-19 SDOH — SOCIAL STABILITY: SOCIAL INSECURITY
WITHIN THE LAST YEAR, HAVE YOU BEEN KICKED, HIT, SLAPPED, OR OTHERWISE PHYSICALLY HURT BY YOUR PARTNER OR EX-PARTNER?: NO

## 2025-02-19 SDOH — HEALTH STABILITY: MENTAL HEALTH: WISH TO BE DEAD (PAST 1 MONTH): NO

## 2025-02-19 SDOH — SOCIAL STABILITY: SOCIAL INSECURITY: HAVE YOU HAD THOUGHTS OF HARMING ANYONE ELSE?: NO

## 2025-02-19 SDOH — ECONOMIC STABILITY: FOOD INSECURITY: WITHIN THE PAST 12 MONTHS, YOU WORRIED THAT YOUR FOOD WOULD RUN OUT BEFORE YOU GOT THE MONEY TO BUY MORE.: NEVER TRUE

## 2025-02-19 SDOH — ECONOMIC STABILITY: FOOD INSECURITY: WITHIN THE PAST 12 MONTHS, THE FOOD YOU BOUGHT JUST DIDN'T LAST AND YOU DIDN'T HAVE MONEY TO GET MORE.: NEVER TRUE

## 2025-02-19 SDOH — SOCIAL STABILITY: SOCIAL INSECURITY: DOES ANYONE TRY TO KEEP YOU FROM HAVING/CONTACTING OTHER FRIENDS OR DOING THINGS OUTSIDE YOUR HOME?: NO

## 2025-02-19 SDOH — HEALTH STABILITY: MENTAL HEALTH: WERE YOU ABLE TO COMPLETE ALL THE BEHAVIORAL HEALTH SCREENINGS?: YES

## 2025-02-19 SDOH — SOCIAL STABILITY: SOCIAL INSECURITY: HAS ANYONE EVER THREATENED TO HURT YOUR FAMILY OR YOUR PETS?: NO

## 2025-02-19 SDOH — ECONOMIC STABILITY: HOUSING INSECURITY: DO YOU FEEL UNSAFE GOING BACK TO THE PLACE WHERE YOU ARE LIVING?: NO

## 2025-02-19 SDOH — ECONOMIC STABILITY: TRANSPORTATION INSECURITY: IN THE PAST 12 MONTHS, HAS LACK OF TRANSPORTATION KEPT YOU FROM MEDICAL APPOINTMENTS OR FROM GETTING MEDICATIONS?: NO

## 2025-02-19 SDOH — SOCIAL STABILITY: SOCIAL INSECURITY: ARE YOU OR HAVE YOU BEEN THREATENED OR ABUSED PHYSICALLY, EMOTIONALLY, OR SEXUALLY BY ANYONE?: NO

## 2025-02-19 SDOH — SOCIAL STABILITY: SOCIAL INSECURITY: ABUSE SCREEN: ADULT

## 2025-02-19 SDOH — SOCIAL STABILITY: SOCIAL INSECURITY
WITHIN THE LAST YEAR, HAVE YOU BEEN RAPED OR FORCED TO HAVE ANY KIND OF SEXUAL ACTIVITY BY YOUR PARTNER OR EX-PARTNER?: NO

## 2025-02-19 SDOH — SOCIAL STABILITY: SOCIAL INSECURITY: WITHIN THE LAST YEAR, HAVE YOU BEEN AFRAID OF YOUR PARTNER OR EX-PARTNER?: NO

## 2025-02-19 SDOH — SOCIAL STABILITY: SOCIAL INSECURITY: HAVE YOU HAD ANY THOUGHTS OF HARMING ANYONE ELSE?: NO

## 2025-02-19 SDOH — HEALTH STABILITY: MENTAL HEALTH: SUICIDAL BEHAVIOR (LIFETIME): NO

## 2025-02-19 ASSESSMENT — LIFESTYLE VARIABLES
HOW OFTEN DO YOU HAVE A DRINK CONTAINING ALCOHOL: NEVER
SKIP TO QUESTIONS 9-10: 1
HOW MANY STANDARD DRINKS CONTAINING ALCOHOL DO YOU HAVE ON A TYPICAL DAY: PATIENT DOES NOT DRINK
HOW OFTEN DO YOU HAVE 6 OR MORE DRINKS ON ONE OCCASION: NEVER
AUDIT-C TOTAL SCORE: 0
AUDIT-C TOTAL SCORE: 0

## 2025-02-19 ASSESSMENT — ACTIVITIES OF DAILY LIVING (ADL)
LACK_OF_TRANSPORTATION: NO
LACK_OF_TRANSPORTATION: NO

## 2025-02-19 ASSESSMENT — PATIENT HEALTH QUESTIONNAIRE - PHQ9
2. FEELING DOWN, DEPRESSED OR HOPELESS: NOT AT ALL
SUM OF ALL RESPONSES TO PHQ9 QUESTIONS 1 & 2: 0
1. LITTLE INTEREST OR PLEASURE IN DOING THINGS: NOT AT ALL

## 2025-02-19 ASSESSMENT — PAIN - FUNCTIONAL ASSESSMENT: PAIN_FUNCTIONAL_ASSESSMENT: 0-10

## 2025-02-19 ASSESSMENT — PAIN SCALES - GENERAL
PAINLEVEL_OUTOF10: 0 - NO PAIN
PAINLEVEL_OUTOF10: 0 - NO PAIN

## 2025-02-19 NOTE — SIGNIFICANT EVENT
Brief heme note:     24 year old female currently 29 weeks pregnant, chronic HBV infection admitted for fetal monitoring. Heme is consulted for pancytopenia.      Note B12 585 on 2/14 and folate 11 on 2/14  Note ferritin 15, indicative of iron deficiency anemia  Ultrasound 4/1/2024 at [facility] with enlarged spleen 16.9cm, liver normal echotexture, no comment on enlargement. Fibroscan 11/13/2023 without fibrosis     She as seen by hematology at [facility] previously. At the time some concern her chronic HBV infection was contributing to her thrombocytopenia.     Please obtain additional labs:   -CBC with diff  -CMP   -Direct bilirubin  -LDH  -Haptoglobin  -Retic count  -PT/aPTT   -Fibrinogen   -HIV  -Slide request, select for provider review     Full consult to follow tomorrow.     Lisa Mancera MD  Hematology-Oncology Fellow, PGY4  Hematology Consult Pager: [phone]

## 2025-02-19 NOTE — PROGRESS NOTES
Tdap vaccine per Dr. Mcgarry  Given IM into right deltoid  Supplied by office  Patient tolerated well    LOT #:   Expiration date: 03/22/2027

## 2025-02-19 NOTE — ASSESSMENT & PLAN NOTE
-MAURO of 26 on imaging today, mild category  -anatomy was incomplete though no malformations seen   -due for 1hr gtt

## 2025-02-19 NOTE — H&P
OB Admission H&P    Assessment/Plan    Ramez Stoddard is a 24 y.o.  at 29w0d by 28wk US presenting for admission in the setting of 8/10 BPP and pancytopenia.     Pancytopenia  Pancytopenia with low WBC, Hgb, and platelets most recently on . She has previously had a history of low WBC count (divya to 2.9) and thrombocytopenia (divya to 70 in pregnancy). Her WBC was found to be 3.0 and platelets of 40 most recently.  - CBC with diff, LDH/uric acid, haptoglobin, direct bili, coags, peripheral smear, fibrinogen, lead level, reticulocytes pending on admission, Myraid cfDNA to be collected tomorrow  - If bone marrow biopsy indicated, there is no obstetric contraindication  - Hematology consulted, appreciate recs    Chronic Hepatitis B  - Last seen by ID in 2023, consistent with a chronic infection and she denied ever having been treated for Hep B in the past. Last Hep B labs were in 2023 showing HBV  IU/mL (2.23 log international units/mL) & negative HBVe antigen. If viral load is <200,000IU/mL, then low risk for vertical transmission and no indication for anti-viral treatment.  - For HBV load, HBeAg, CMP pending on admission    Hx IUFD at 39wks, s/p  2024 w/ hx CS (unknown uterine scar)  - Prior pregnancy eval revealed likely cause of IUFD was placental abruption and cord accident.   - APLS drawn and pending on admission    Routine  - Needs 1hr gtt  - Tdap to be offered  - PPBC to be discussed  - GBS deferred  - NICU deferred    Fetal Status  - NST in office AGA today  - Continue daily NSTs  - Presentation transverse  based on ultrasound  - Last US : EFW 1341g 42%, 36%. Mild poly MAURO 26  - GBS deferred    Seen and discussed with Dr. Dodge.   Angeli Lou MD   PGY-2, MFM     Pregnancy Problems (from 25 to present)       Problem Noted Diagnosed Resolved    History of IUFD 2/15/2025 by Lisa Mcgarry MD  No    Priority:  Medium       Overview Signed 2/15/2025   "2:33 PM by Lisa Mcgarry MD     2024 - IUFD at 39.3wks, delivery via , complicated by placental abruption          29 weeks gestation of pregnancy ([facility]) 2/15/2025 by Lisa Mcgarry MD  No    Priority:  Medium       Overview Signed 2/15/2025  2:36 PM by Lisa Mcgarry MD     Desired provider in labor: [] CNM  [x] Physician  [x] Initial BMI: unable to be calculated   [x] Prenatal Labs: Hep B positive, pancytopenia, otherwise WNL  [x] Rh status: positive    [] Genetic Screening:    [x] Pregnancy dated by: bedside US on      [] Anatomy US:   [] 1hr GCT at 24-28wks:   [] Fetal Surveillance (if indicated):   [] Tdap (27-36wks):      [] Breastfeeding:   [] Postpartum Birth control method:   [] GBS at 36 - 37 wks:   [] 39 weeks discussion of IOL vs. Expectant management:   [] Mode of delivery ( anticipated ):            Pancytopenia 2025 by REHANA Camarena  No    Priority:  Medium       Overview Addendum 2/15/2025  2:32 PM by Lisa Mcgarry MD     Diagnosed on  in triage visit with WBC of 3.0, Hgb of 8.3, and Plt of 40   [] follow up labs   [] Hematology appointment on 3/3          Thrombocytopenia affecting pregnancy, antepartum (Multi) 7/10/2024 by SHALOM Hines  No    Priority:  Medium       Overview Signed 2/15/2025  2:31 PM by Lisa Mcgarry MD     Plt in triage on  of 40  Hx of thrombocytopenia outside of pregnancy  [] hematology appointment on 3/3            Hepatitis B complicating pregnancy in second trimester ([facility]) 2023 by ASAD CamarenaCNP  No    Priority:  Medium       Overview Addendum 2/15/2025  2:29 PM by Lisa Mcgarry MD     Hep B Ag positive ()   Hx of chronic Hep B - seen by ID in 2023                  Subjective   Ramez Stoddard is a 24 y.o.  at 29w0d who is presenting as a direct admission from clinic due to pancytopenia and BPP 8/10.   A Greek  was used for the entirety of the interview.      Per clinic visit:   \"She " "was seen in triage on 2/14 for decreased fetal movement and abdominal pain. At that evaluation, a bedside ultrasound was performed and she was thought to be ~28wks. MAURO at that time was thought to be \"subjectively normal.\" She was unable to be continuously monitored and left prior to NST being fully obtained, though fetal heart rate was noted be normal at 145bpm. She was discharged after lab draw and prior to labs resulting. Upon review of her blood work, she was found to be pancytopenic as below with WBC of 3.0, Hgb of 8.3, and Plt of 40. The patient was attempted to be called for repeat evaluation in the hospital though did not present. Blood work was also significant for Hep B Ag positivity for which she has a history.      She was last seen for her Hep B infection by ID in Nov 2023 at which point it was consistent with a chronic infection and she denied ever having been treated for Hep B in the past. Her last Hep B labs were in August of 2023 showing HBV DNA of 168 international units/mL (2.23 log international units/mL) & negative HBVe antigen.      She has a history of thrombocytopenia. The oldest labs available for review are from July 2023 with WBC of 3.2, Hgb WNL at 12.7, Plt of 118 and differential showing low monocytes of 150. The lowest her WBC has ever gotten is 2.9. During her last pregnancy, WBC and Hgb were noted to be WNL and stable though thrombocytopenia worsened and nadired down to 70. She was then referred to and seen by hematology at [facility]. This prompted an abdominal US that was performed in April 2024 and showed normal RUQ with splenomegaly measuring 16.9cm. She was supposed to follow up with hematology though did not. There was concern that her chronic Hep B infection was contributing to the thrombocytopenia, though other causes were not ruled out.      She then presented to  L&D for contractions in July 2024 and was found to have an IUFD at 39.3wks that was complicated by a placental " abruption.      History otherwise significant for hx of Cdx1 in [place]  and pelvic vs horseshoe kidney.     Currently, she is reporting a rash that is itchy. Otherwise denies history of easy bruising, nose bleeds/gum bleeding.     OB History    Para Term  AB Living   3 2 2 0 0 1   SAB IAB Ectopic Multiple Live Births   0 0 0 0 1      # Outcome Date GA Lbr Javier/2nd Weight Sex Type Anes PTL Lv   3 Current            2 Term 07/10/24 39w3d / 00:07 3.53 kg  Vag-Spont None  FD      Complications: IUFD at 20 weeks or more of gestation      Name: Armin Stoddard      Apgar1: 0  Apgar5: 0   1 Term      CS-Unspec   EDILBERTO       Past Surgical History:   Procedure Laterality Date     SECTION, LOW TRANSVERSE         Social History     Tobacco Use    Smoking status: Never    Smokeless tobacco: Never   Substance Use Topics    Alcohol use: Never       No Known Allergies    Medications Prior to Admission   Medication Sig Dispense Refill Last Dose/Taking    prenatal vit,calc76-iron-folic (Prenatabs Rx) 29 mg iron- 1 mg tablet Take 1 tablet by mouth once daily. 30 tablet 2      Objective     Last Vitals  Temp Pulse Resp BP MAP O2 Sat   36.5 °C (97.7 °F) 96 16 102/66 78 100 %     Blood Pressures         2025  1525             BP: 102/66             Physical Exam  General: no acute distress  HEENT: normocephalic, atraumatic  Heart: warm and well perfused  Lungs: breathing comfortably on room air  Abdomen: gravid   Extremities: moving all extremities spontaneously  Neuro: awake and conversant  Psych: appropriate mood and affect        Labs in chart were reviewed.   Results from last 7 days   Lab Units 25  1326   WBC AUTO x10*3/uL 3.0*   HEMOGLOBIN g/dL 8.3*   HEMATOCRIT % 28.7*   PLATELETS AUTO x10*3/uL 40*   AST U/L 74*   ALT U/L 37   CREATININE mg/dL 0.38*        Prenatal labs reviewed, remarkable for Hepatitis B pos, pancytopenia.

## 2025-02-20 ENCOUNTER — TELEPHONE (OUTPATIENT)
Dept: OBSTETRICS AND GYNECOLOGY | Facility: HOSPITAL | Age: 25
End: 2025-02-20
Payer: MEDICAID

## 2025-02-20 ENCOUNTER — PHARMACY VISIT (OUTPATIENT)
Dept: PHARMACY | Facility: CLINIC | Age: 25
End: 2025-02-20
Payer: MEDICAID

## 2025-02-20 ENCOUNTER — HOSPITAL ENCOUNTER (OUTPATIENT)
Facility: HOSPITAL | Age: 25
Discharge: HOME | End: 2025-02-20
Attending: OBSTETRICS & GYNECOLOGY | Admitting: OBSTETRICS & GYNECOLOGY
Payer: MEDICAID

## 2025-02-20 ENCOUNTER — APPOINTMENT (OUTPATIENT)
Dept: LAB | Facility: HOSPITAL | Age: 25
End: 2025-02-20
Payer: MEDICAID

## 2025-02-20 ENCOUNTER — HOSPITAL ENCOUNTER (OUTPATIENT)
Facility: HOSPITAL | Age: 25
End: 2025-02-20
Attending: OBSTETRICS & GYNECOLOGY | Admitting: OBSTETRICS & GYNECOLOGY
Payer: MEDICAID

## 2025-02-20 VITALS
HEART RATE: 103 BPM | SYSTOLIC BLOOD PRESSURE: 99 MMHG | TEMPERATURE: 98.6 F | RESPIRATION RATE: 18 BRPM | OXYGEN SATURATION: 99 % | DIASTOLIC BLOOD PRESSURE: 54 MMHG

## 2025-02-20 DIAGNOSIS — O09.93 SUPERVISION OF HIGH RISK PREGNANCY IN THIRD TRIMESTER (HHS-HCC): Primary | ICD-10-CM

## 2025-02-20 DIAGNOSIS — Z3A.29 29 WEEKS GESTATION OF PREGNANCY (HHS-HCC): ICD-10-CM

## 2025-02-20 DIAGNOSIS — O98.412: ICD-10-CM

## 2025-02-20 DIAGNOSIS — B19.10: ICD-10-CM

## 2025-02-20 DIAGNOSIS — D61.818 PANCYTOPENIA: Primary | ICD-10-CM

## 2025-02-20 DIAGNOSIS — D50.9 IRON DEFICIENCY ANEMIA, UNSPECIFIED IRON DEFICIENCY ANEMIA TYPE: ICD-10-CM

## 2025-02-20 LAB
DRVVT SCREEN TO CONFIRM RATIO: 0.75 RATIO
DRVVT/DRVVT CFM NRMLZD PPP-RTO: 0.9 RATIO
DRVVT/DRVVT CFM P DOAC NEUT NORM PPP-RTO: 0.83 RATIO
EBV EA IGG SER QL: NEGATIVE
EBV NA AB SER QL: POSITIVE
EBV VCA IGG SER IA-ACNC: POSITIVE
EBV VCA IGM SER IA-ACNC: NEGATIVE
GLUCOSE 1H P 50 G GLC PO SERPL-MCNC: 64 MG/DL
HBV DNA SERPL NAA+PROBE-ACNC: ABNORMAL IU/ML
HBV DNA SERPL NAA+PROBE-ACNC: DETECTED [IU]/ML
HBV DNA SERPL NAA+PROBE-LOG IU: 5.23 LOG IU/ML
LA 2 SCREEN W REFLEX-IMP: NORMAL
MALARIA SMEAR BLD: NEGATIVE
NORMALIZED SCT PPP-RTO: 0.53 RATIO
PLASMODIUM AG BLD IA.RAPID: NEGATIVE
REVIEWED BY: NORMAL
SILICA CLOTTING TIME CONFIRMATION: 1.09 RATIO
SILICA CLOTTING TIME SCREEN: 0.57 RATIO

## 2025-02-20 PROCEDURE — 87207 SMEAR SPECIAL STAIN: CPT

## 2025-02-20 PROCEDURE — 36415 COLL VENOUS BLD VENIPUNCTURE: CPT

## 2025-02-20 PROCEDURE — 99254 IP/OBS CNSLTJ NEW/EST MOD 60: CPT | Performed by: STUDENT IN AN ORGANIZED HEALTH CARE EDUCATION/TRAINING PROGRAM

## 2025-02-20 PROCEDURE — 82947 ASSAY GLUCOSE BLOOD QUANT: CPT

## 2025-02-20 PROCEDURE — 99214 OFFICE O/P EST MOD 30 MIN: CPT

## 2025-02-20 PROCEDURE — RXMED WILLOW AMBULATORY MEDICATION CHARGE

## 2025-02-20 PROCEDURE — 82525 ASSAY OF COPPER: CPT

## 2025-02-20 PROCEDURE — 2500000004 HC RX 250 GENERAL PHARMACY W/ HCPCS (ALT 636 FOR OP/ED): Mod: SE

## 2025-02-20 RX ORDER — FERROUS SULFATE 325(65) MG
325 TABLET ORAL
Qty: 90 TABLET | Refills: 0 | Status: SHIPPED | OUTPATIENT
Start: 2025-02-20 | End: 2025-02-20

## 2025-02-20 RX ORDER — LIDOCAINE HYDROCHLORIDE 10 MG/ML
0.5 INJECTION, SOLUTION INFILTRATION; PERINEURAL ONCE AS NEEDED
Status: DISCONTINUED | OUTPATIENT
Start: 2025-02-20 | End: 2025-02-20 | Stop reason: HOSPADM

## 2025-02-20 RX ORDER — FERROUS SULFATE 325(65) MG
325 TABLET ORAL
Qty: 90 TABLET | Refills: 0 | Status: SHIPPED | OUTPATIENT
Start: 2025-02-20 | End: 2025-05-21

## 2025-02-20 RX ORDER — TENOFOVIR DISOPROXIL FUMARATE 300 MG/1
300 TABLET, FILM COATED ORAL DAILY
Qty: 90 TABLET | Refills: 1 | Status: SHIPPED | OUTPATIENT
Start: 2025-02-20 | End: 2025-08-19

## 2025-02-20 RX ORDER — LABETALOL HYDROCHLORIDE 5 MG/ML
20 INJECTION, SOLUTION INTRAVENOUS ONCE AS NEEDED
Status: DISCONTINUED | OUTPATIENT
Start: 2025-02-20 | End: 2025-02-20 | Stop reason: HOSPADM

## 2025-02-20 RX ORDER — ONDANSETRON 4 MG/1
4 TABLET, FILM COATED ORAL EVERY 6 HOURS PRN
Status: DISCONTINUED | OUTPATIENT
Start: 2025-02-20 | End: 2025-02-20 | Stop reason: HOSPADM

## 2025-02-20 RX ORDER — NIFEDIPINE 10 MG/1
10 CAPSULE ORAL ONCE AS NEEDED
Status: DISCONTINUED | OUTPATIENT
Start: 2025-02-20 | End: 2025-02-20 | Stop reason: HOSPADM

## 2025-02-20 RX ORDER — ONDANSETRON HYDROCHLORIDE 2 MG/ML
4 INJECTION, SOLUTION INTRAVENOUS EVERY 6 HOURS PRN
Status: DISCONTINUED | OUTPATIENT
Start: 2025-02-20 | End: 2025-02-20 | Stop reason: HOSPADM

## 2025-02-20 RX ORDER — HYDRALAZINE HYDROCHLORIDE 20 MG/ML
5 INJECTION INTRAMUSCULAR; INTRAVENOUS ONCE AS NEEDED
Status: DISCONTINUED | OUTPATIENT
Start: 2025-02-20 | End: 2025-02-20 | Stop reason: HOSPADM

## 2025-02-20 RX ADMIN — IRON SUCROSE 200 MG: 20 INJECTION, SOLUTION INTRAVENOUS at 14:33

## 2025-02-20 SDOH — SOCIAL STABILITY: SOCIAL INSECURITY: ARE YOU OR HAVE YOU BEEN THREATENED OR ABUSED PHYSICALLY, EMOTIONALLY, OR SEXUALLY BY ANYONE?: NO

## 2025-02-20 SDOH — HEALTH STABILITY: MENTAL HEALTH: HAVE YOU USED ANY PRESCRIPTION DRUGS OTHER THAN PRESCRIBED IN THE PAST 12 MONTHS?: NO

## 2025-02-20 SDOH — HEALTH STABILITY: MENTAL HEALTH: NON-SPECIFIC ACTIVE SUICIDAL THOUGHTS (PAST 1 MONTH): NO

## 2025-02-20 SDOH — SOCIAL STABILITY: SOCIAL INSECURITY: HAVE YOU HAD THOUGHTS OF HARMING ANYONE ELSE?: NO

## 2025-02-20 SDOH — HEALTH STABILITY: MENTAL HEALTH: WERE YOU ABLE TO COMPLETE ALL THE BEHAVIORAL HEALTH SCREENINGS?: YES

## 2025-02-20 SDOH — HEALTH STABILITY: MENTAL HEALTH: WISH TO BE DEAD (PAST 1 MONTH): NO

## 2025-02-20 SDOH — SOCIAL STABILITY: SOCIAL INSECURITY: PHYSICAL ABUSE: DENIES

## 2025-02-20 SDOH — SOCIAL STABILITY: SOCIAL INSECURITY: HAS ANYONE EVER THREATENED TO HURT YOUR FAMILY OR YOUR PETS?: NO

## 2025-02-20 SDOH — SOCIAL STABILITY: SOCIAL INSECURITY: VERBAL ABUSE: DENIES

## 2025-02-20 SDOH — SOCIAL STABILITY: SOCIAL INSECURITY: DO YOU FEEL ANYONE HAS EXPLOITED OR TAKEN ADVANTAGE OF YOU FINANCIALLY OR OF YOUR PERSONAL PROPERTY?: NO

## 2025-02-20 SDOH — SOCIAL STABILITY: SOCIAL INSECURITY: HAVE YOU HAD ANY THOUGHTS OF HARMING ANYONE ELSE?: NO

## 2025-02-20 SDOH — SOCIAL STABILITY: SOCIAL INSECURITY: ARE THERE ANY APPARENT SIGNS OF INJURIES/BEHAVIORS THAT COULD BE RELATED TO ABUSE/NEGLECT?: NO

## 2025-02-20 SDOH — SOCIAL STABILITY: SOCIAL INSECURITY: DOES ANYONE TRY TO KEEP YOU FROM HAVING/CONTACTING OTHER FRIENDS OR DOING THINGS OUTSIDE YOUR HOME?: NO

## 2025-02-20 SDOH — HEALTH STABILITY: MENTAL HEALTH: HAVE YOU USED ANY SUBSTANCES (CANABIS, COCAINE, HEROIN, HALLUCINOGENS, INHALANTS, ETC.) IN THE PAST 12 MONTHS?: NO

## 2025-02-20 SDOH — SOCIAL STABILITY: SOCIAL INSECURITY: ABUSE SCREEN: ADULT

## 2025-02-20 SDOH — ECONOMIC STABILITY: HOUSING INSECURITY: DO YOU FEEL UNSAFE GOING BACK TO THE PLACE WHERE YOU ARE LIVING?: NO

## 2025-02-20 ASSESSMENT — LIFESTYLE VARIABLES
AUDIT-C TOTAL SCORE: 0
AUDIT-C TOTAL SCORE: 0
HOW OFTEN DO YOU HAVE A DRINK CONTAINING ALCOHOL: NEVER
HOW MANY STANDARD DRINKS CONTAINING ALCOHOL DO YOU HAVE ON A TYPICAL DAY: PATIENT DOES NOT DRINK
HOW OFTEN DO YOU HAVE 6 OR MORE DRINKS ON ONE OCCASION: NEVER
SKIP TO QUESTIONS 9-10: 1

## 2025-02-20 ASSESSMENT — PAIN SCALES - GENERAL: PAINLEVEL_OUTOF10: 0 - NO PAIN

## 2025-02-20 ASSESSMENT — PATIENT HEALTH QUESTIONNAIRE - PHQ9
SUM OF ALL RESPONSES TO PHQ9 QUESTIONS 1 & 2: 0
1. LITTLE INTEREST OR PLEASURE IN DOING THINGS: NOT AT ALL
2. FEELING DOWN, DEPRESSED OR HOPELESS: NOT AT ALL

## 2025-02-20 NOTE — SIGNIFICANT EVENT
Brief Update    To bedside for patient frustration. Patient has been expressing difficulty tolerating hospital foods and was not open to additional food resources offered. Understands need for evaluation by MFM and hematology and was amenable to lab collection this evening. Discussed importance of her consultation for pancytopenia.     Patient electing to leave AMA, expressing understanding of importance of returning. Agrees to return at 7am via triage for MFM care and hematology re-consult. Form signed, return precautions discussed.    Hematology team notified. Will notify MFM day team.    Martha Clancy MD, MPH  Obstetrics & Gynecology, PGY-1

## 2025-02-20 NOTE — H&P
OB Triage H&P    Assessment/Plan    Ramez Stoddard is a 24 y.o.  at 29w1d by 28wk US presenting for re-introduction into care in the setting of pancytopenia.      Pancytopenia  Pancytopenia with low WBC, Hgb, and platelets most recently on . She has previously had a history of low WBC count (divya to 2.9) and thrombocytopenia (divya to 70 in pregnancy). Her WBC was found to be 3.0 and platelets of 40 most recently.  - CBC with diff, reticulocytes consistent with ongoing pancytopenia. LDH/uric acid, haptoglobin, direct bili overall non concerning for hemyolysis. Coags, fibrinogen unremarkable. Peripheral smear without evidence of malaria on rapid screen, final smear pending. Lead and copper level unable to to be obtained while inpatient.    - Myraid cfDNA collected.   - Workup thus far notable for possible iron deficiency anemia vs autoimmune process. Hemolysis not likely and findings possibly attributed to chronic hepatitis B.   - Hematology consulted, appreciate recs: S/P IV iron sucrose infusion with continued PO supplementation outpatient. Will order repeat outpatient abdominal quadrant ultrasound and help to coordinate close benign hematology follow up.  - Can consider steroids in anticipation of delivery starting after 32wks, cannot rule out ITP  - If bone marrow biopsy indicated, there is no obstetric contraindication     Chronic Hepatitis B  - Last seen by ID in 2023, consistent with a chronic infection and she denied ever having been treated for Hep B in the past.   - HBV load notably 169K on admission. CMP unremarkable. HBeAg still pending. Given viral load close to approximately 200K, and previous unknown level of control, will elect to start antiviral therapy with tenofovir 300mg daily during pregnancy.     Hx IUFD at 39wks, s/p  2024 w/ hx CS (unknown uterine scar)  - Prior pregnancy eval revealed likely cause of IUFD was placental abruption and cord accident.   - APLS drawn  and wnl on admission     Routine  - 1hr gtt WNL     Fetal Status  - NST AGA today  - Presentation transverse  based on ultrasound  - Last US : EFW 1341g 42%, 36%. Mild poly MAURO 26     Seen and discussed with Dr. Dodge.   Angeli Lou MD   PGY-2, MFM     Pregnancy Problems (from 25 to present)       Problem Noted Diagnosed Resolved    Polyhydramnios in third trimester ([facility]) 2025 by Lisa Mcgarry MD  No    Priority:  Medium       Overview Signed 2025  3:43 PM by Lisa Mcgarry MD     MAURO of 26 at 29.0wks   [] 1hr gtt  [x] anatomy - incomplete though no malformations          History of IUFD 2/15/2025 by Lisa Mcgarry MD  No    Priority:  Medium       Overview Addendum 2025  3:48 PM by Lisa Mcgarry MD     2024 - IUFD at 39.3wks, delivery via , complicated by placental abruption   [] weekly NST at 32 wks   [] genetic screening - desired          29 weeks gestation of pregnancy ([facility]) 2/15/2025 by Lisa Mcgarry MD  No    Priority:  Medium       Overview Addendum 2025  3:47 PM by Lisa Mcgarry MD     Desired provider in labor: [] CNM  [x] Physician  [x] Initial BMI: unable to be calculated   [x] Prenatal Labs: Hep B positive, pancytopenia, otherwise WNL  [x] Rh status: positive    [] Genetic Screening:    [x] Pregnancy dated by: bedside US on      [x] Anatomy US: incomplete on  though no malformations  [] 1hr GCT at 24-28wks:   [] Fetal Surveillance (if indicated):   [x] Tdap (27-36wks): 25     [] Breastfeeding:   [] Postpartum Birth control method:   [] GBS at 36 - 37 wks:   [] 39 weeks discussion of IOL vs. Expectant management:   [x] Mode of delivery ( anticipated ): desires TOLAC           Pancytopenia 2025 by JACQUES Camarena-CNP  No    Priority:  Medium       Overview Addendum 2/15/2025  2:32 PM by Lisa Mcgarry MD     Diagnosed on  in triage visit with WBC of 3.0, Hgb of 8.3, and Plt of 40   [] follow up labs   [] Hematology appointment on  3/3          Thrombocytopenia affecting pregnancy, antepartum (Multi) 7/10/2024 by SHALOM Hines  No    Priority:  Medium       Overview Signed 2/15/2025  2:31 PM by Lisa Mcgarry MD     Plt in triage on  of 40  Hx of thrombocytopenia outside of pregnancy  [] hematology appointment on 3/3            History of  delivery, currently pregnant ([facility]) 2023 by REHANA Camarena  No    Priority:  Medium       Overview Addendum 2025  3:48 PM by Lisa Mcgarry MD     Performed in [facility], unknown uterine incision   S/p  in setting of IUFD in   MFMU score of 94% - desires TOLAC         Hepatitis B complicating pregnancy in second trimester ([facility]) 2023 by REHANA Camarena  No    Priority:  Medium       Overview Addendum 2025  3:47 PM by Lisa Mcgarry MD     Hep B Ag positive ()   Hx of chronic Hep B - seen by ID in 2023   [] repeat Hep B labs  [] hepatology consult  [] baby to get HIB & Hep B vaccines  [] make peds aware                 Subjective   Ramez Stoddard is a 24 y.o.  at 29w0d by 28wk US without prenatal care with PMH of chronic hepatitis B, thrombocytopenia, leukopenia, and stillbirth presenting for workup of pancytopenia and hematology consult. She was admitted yesterday to the antepartum service, however had to discharge due to social issues.   See admission HPI for more details.     Today, she remains asymptomatic. She continues to deny any bleeding symptoms including nose bleeds/gum bleeding, easy bruising, petechial rash.  She reported to hematology that she has had malaria in [place] but has not had recent cyclic fevers and fevers and chills. She denies melena, hematuria, NSAID use, and exposure to lakes. She said her stool is white.     Otherwise, denies VB, LOF, CTX. Reports normal fetal movement.     OB History    Para Term  AB Living   3 2 2 0 0 1   SAB IAB Ectopic Multiple Live Births   0 0 0 0 1      # Outcome  Date GA Lbr Javier/2nd Weight Sex Type Anes PTL Lv   3 Current            2 Term 07/10/24 39w3d / 00:07 3.53 kg  Vag-Spont None  FD      Complications: IUFD at 20 weeks or more of gestation      Name: Armin Stoddard      Apgar1: 0  Apgar5: 0   1 Term      CS-Unspec   EDILBERTO       Past Surgical History:   Procedure Laterality Date     SECTION, LOW TRANSVERSE         Social History     Tobacco Use    Smoking status: Never    Smokeless tobacco: Never   Substance Use Topics    Alcohol use: Never       No Known Allergies    No medications prior to admission.     Objective     Last Vitals  Temp Pulse Resp BP MAP O2 Sat   37 °C (98.6 °F) 103 18 99/54 69 99 %     Blood Pressures         2025  1007 2025  1435 2025  1445 2025  1448 2025  1503    BP: 108/55 104/50 85/42 108/56 99/54             Physical Exam  General: no acute distress  HEENT: normocephalic, atraumatic  Heart: warm and well perfused  Lungs: breathing comfortably on room air  Abdomen: gravid   Extremities: moving all extremities spontaneously  Neuro: awake and conversant  Psych: appropriate mood and affect      Fetal Monitoring  Baseline: 140 bpm, Variability: moderate,  Accelerations: present and Decelerations: none  Uterine Activity: No contractions seen on toco  Interpretation: Reactive    Bedside ultrasound: No    Results for orders placed or performed during the hospital encounter of 25 (from the past 24 hours)   Radha-Barr Virus Antibody Panel (VCA IgG/IgM, EA IgG, NA IgG)   Result Value Ref Range    EBV VCA, IgG  Positive (A) Negative    EBV VCA, IgM  Negative Negative    EBV Early Antigen Antibody, IgG Negative Negative    EBV Nuclear Antigen Antibody, IgG Positive (A) Negative   Malaria Smear   Result Value Ref Range    Rapid Malaria Screen Negative Negative    Malaria Smear Exam Negative Negative    Reviewed By     Glucose, 1 Hour Screen, Pregnancy   Result Value Ref Range    Glucose, 1 Hr Screen, Preg  64 <135 mg/dL

## 2025-02-20 NOTE — CONSULTS
"Name: Ramez Stoddard  MRN: [id number]  Encounter Date: 2025  PCP: No Assigned PCP Generic Provider, MD  Heme-Onc: N/A    Reason for consult: Pancytopenia, 29 weeks pregnant  Attending Provider: Dr. Dmitriy Dodge MD    Hematology/ Oncology Consult Note      History of Present Illness   Ramez Stoddard is a 24 y.o.  female at 29w0d without prenatal care with PMH of chronic hepatitis B, thrombocytopenia, leukopenia, and stillbirth presenting for pancytopenia that was found on  after she came into  ED with abdominal pain and decreased fetal movement.     Heme is consulted for pancytopenia.     Patient is immigrant from [place], [place] since . Upon review of her blood work, she was found to be pancytopenic as below with WBC of 3.0, Hgb of 8.3, and Plt of 40. The patient was attempted to be called for repeat evaluation in the hospital though did not present. Blood work was also significant for Hep B Ag positivity on  for which she has a history.      She was last seen for her Hep B infection by ID in 2023 at which point it was consistent with a chronic infection and she denied ever having been treated for Hep B in the past. She was seen by hematology at [facility] in 3/2024. Work up revealed low zinc 49ug/dL. B12, folate and copper not deficient. Ultrasound 2024 with normal appearing liver and splenomegaly 16.9cm.      She then presented to  L&D for contractions in 2024 and was found to have an IUFD at 39.3wks that was complicated by a placental abruption. She has not had a period since that delivery and believes she got pregnant in 2024.      History otherwise significant for hx of Cdx1 in [place] because her baby was \"too big\" and pelvic vs horseshoe kidney.     Interview conducted with .     Patient reports she is feeling well. Currently, she is reporting some itching but no rashes. Otherwise denies history of easy bruising, nose bleeds/gum bleeding, melena, " hematochezia, or hematuria. She does report that she has lost about 4kg of weight since the start of pregnancy (about 8 pounds). She denies any fatigue or pain. She is feeling fetal movement.     She is not on a prenatal vitamin and only eats meat 3 times a month. She said malaria is common in [facility], but she has not had recent cyclic fevers and fevers and chills. No NSAID use, and or exposure to lakes from when she was in [facility]. She said her stool is white.    Reports menstrual cycles are typically ~3 days in length. Denies excessive bleeding or clots during periods. 1 prior history of c section, denies excessive bleeding or poor wound healing related to this procedure.     Heme/Onc History    She has a history of thrombocytopenia. The oldest labs available for review are from 2023 with WBC of 3.2, Hgb WNL at 12.7, Plt of 118 and differential showing low monocytes of 150. The lowest her WBC has ever gotten is 2.9. During her last pregnancy, WBC and Hgb were noted to be WNL and stable though thrombocytopenia worsened and nadired down to 70. She was then referred to and seen by hematology at [facility]. This prompted an abdominal US that was performed in 2024 and showed normal RUQ with splenomegaly measuring 16.9cm. She was supposed to follow up with hematology though did not. There was concern that her chronic Hep B infection was contributing to the thrombocytopenia, though other causes were not ruled out.     Past Medical history   No past medical history on file.      Past Surgical History     Past Surgical History:   Procedure Laterality Date     SECTION, LOW TRANSVERSE           Family History    No family history on file.      Social History     Social History     Socioeconomic History    Marital status: Unknown   Tobacco Use    Smoking status: Never    Smokeless tobacco: Never   Vaping Use    Vaping status: Never Used   Substance and Sexual Activity    Alcohol use: Never    Drug use: Never      Social Drivers of Health     Financial Resource Strain: Low Risk  (2/19/2025)    Overall Financial Resource Strain (CARDIA)     Difficulty of Paying Living Expenses: Not very hard   Food Insecurity: No Food Insecurity (2/19/2025)    Hunger Vital Sign     Worried About Running Out of Food in the Last Year: Never true     Ran Out of Food in the Last Year: Never true   Transportation Needs: No Transportation Needs (2/19/2025)    PRAPARE - Transportation     Lack of Transportation (Medical): No     Lack of Transportation (Non-Medical): No   Physical Activity: Not on File (7/12/2023)    Received from [employer]    Physical Activity     Physical Activity: 0   Stress: Not on File (7/12/2023)    Received from [employer]    Stress     Stress: 0   Social Connections: Not on File (9/19/2024)    Received from [employer]    Social Connections     Connectedness: 0   Intimate Partner Violence: Not At Risk (2/19/2025)    Humiliation, Afraid, Rape, and Kick questionnaire     Fear of Current or Ex-Partner: No     Emotionally Abused: No     Physically Abused: No     Sexually Abused: No         Allergies   No Known Allergies    Medications             hydrALAZINE, 5 mg, Once PRN  labetaloL, 20 mg, Once PRN  lidocaine, 0.5 mL, Once PRN  NIFEdipine, 10 mg, Once PRN  ondansetron, 4 mg, q6h PRN   Or  ondansetron, 4 mg, q6h PRN        Review of Systems   Review of Systems   10 pt ROS reviewed and negative aside from above    Physical Exam   Blood pressure 108/55, pulse 96, SpO2 100%.    Gen: young female, awake, alert, in no acute distress  HEENT: AT/NC, PEERL, EOMI, no LAD. No mucositis   CV: RRR, no m/r/g  Pulm: CTAB, without w/r/r  Abd: gravid  Ext: no LE edema  Skin: warm and dry. No rashes or petechiae   Neuro: A&Ox4, moves all 4 extremities spontaneously     Labs     Results from last 7 days   Lab Units 02/19/25 1957 02/14/25  1326   WBC AUTO x10*3/uL 2.9* 3.0*   HEMOGLOBIN g/dL 7.3* 8.3*   HEMATOCRIT % 24.1* 28.7*   PLATELETS AUTO x10*3/uL 67*  40*       Results from last 7 days   Lab Units 25  1326   SODIUM mmol/L 136 143   POTASSIUM mmol/L 3.5 4.4   CHLORIDE mmol/L 108* 114*   CO2 mmol/L 21 13*   BUN mg/dL 8 10   CREATININE mg/dL 0.39* 0.38*   CALCIUM mg/dL 8.5* 9.1   PROTEIN TOTAL g/dL 6.3* 6.7   BILIRUBIN TOTAL mg/dL 1.3* 1.3*   ALK PHOS U/L 56 59   ALT U/L 23 37   AST U/L 38 74*   GLUCOSE mg/dL 84 70*       Lab Results   Component Value Date    ALT 23 2025    AST 38 2025    ALKPHOS 56 2025    BILITOT 1.3 (H) 2025         Assessment/Plan     Ramez Stoddard is a 24 y.o.  female at 29 weeks gestation w/ a past medical history of chronic hepatitis B, thrombocytopenia, leukopenia, and stillbirth who presents to the ED for fetal monitoring.     [facility] is consulted for pancytopenia, chronic since  although no data prior to this for review.     Her low ferritin, MCV, and hemoglobin point to iron deficiency anemia. Iron deficiency is common during pregnancy but if this persists, consider outpatient work up for GI bleeding or malabsorption.     Hepatitis B may also be contributing to cytopenias. A repeat ultrasound of her liver and spleen could tell us if she still has splenomegaly or liver structure changes from hepatitis B that could also be contributing to her pancytopenia. ITP also remains on the differential.     Her unremarkable blood pressure and liver enzymes makes pre-eclampsia and HELLP unlikely. Low suspicion for hemolysis. APLS testing negative.     She denies infectious symptoms or fevers. With her history of immigration from [facility], tropical diseases such as malaria and schistosomiasis were considered but unlikely based on history and existing data.     Smear reviewed today: anisopoikilocytosis, rare nucleated RBC, teardrop cells, no abnormal appearing WBC, plt count decreased, some plt clumping seen.     #Pancytopenia  #Thrombocytopenia  #Iron-deficiency anemia  Recommendations:  - Please  give IV iron sucrose 200mg today  - Start oral iron supplement   - Please order outpt abdominal quadrant ultrasound to assess for liver structure changes and presence of splenomegaly  - We will arrange for heme follow up to ensure timely follow up prior to her delivery. Per Ob team, they would like plts > 100k by 32 weeks.   -Noted after patient was discharged -- previous zinc level was checked in 3/2024 and was low at that time. This should be repeated on follow up     Thank you for this consult. Patient seen and discussed with attending physician, Dr. Betancur.    THEODORE FARAI, MS-3    I examined the patient and agree with the medical student plan as written above.     Lisa Mancera MD  Hematology-Oncology Fellow, PGY4  Hematology Consult Pager: [phone]

## 2025-02-21 LAB
HBV E AG SERPL QL IA: POSITIVE
PATH REVIEW-BLOOD PARASITE: NORMAL

## 2025-02-25 ENCOUNTER — TELEPHONE (OUTPATIENT)
Dept: OBSTETRICS AND GYNECOLOGY | Facility: HOSPITAL | Age: 25
End: 2025-02-25
Payer: MEDICAID

## 2025-02-25 NOTE — TELEPHONE ENCOUNTER
Called patient using [employer]  to assisst with transportation to upcoming appointments.   No answer, but  left message on this NP's behalf stating reason for call and time/date this NP will call back at, 2/26 at 1200.   Will continue to follow care and be available for coordination as needed.     Glenis Craig CNP  Complex/High Risk OB   [phone]

## 2025-02-26 ENCOUNTER — TELEPHONE (OUTPATIENT)
Dept: OBSTETRICS AND GYNECOLOGY | Facility: HOSPITAL | Age: 25
End: 2025-02-26
Payer: MEDICAID

## 2025-02-26 NOTE — TELEPHONE ENCOUNTER
Called patient to review transportation needs and upcoming appointments using [employer] ([employer] ).  Patient confirmed she would like transportation arranged via insurance.   This NP called [employer] [phone] to schedule rides.     Transportation scheduled for:  Monday 3/3 [facility] Appt, patient to be ready for  1hr prior to appt (11:00am), return ride scheduled for 1:45p  time (Ticket #[id number]).  Wednesday 3/5 [facility] appt, patient to be ready for  1hr prior to appt (12:30), return ride scheduled for  2:45p  time (Ticket # [id number]).  Patient aware to call this NP if transportation issues arise, [phone].     Patient also requested text message with appt details, sent today with this NP's contact info in message.  This NP will continue to follow care and be available for coordination as needed.     Glenis Craig, LUDWIG  Complex/High Risk OB   [phone]

## 2025-02-27 ENCOUNTER — TELEPHONE (OUTPATIENT)
Dept: HEMATOLOGY/ONCOLOGY | Facility: HOSPITAL | Age: 25
End: 2025-02-27
Payer: MEDICAID

## 2025-02-27 NOTE — TELEPHONE ENCOUNTER
RN called patient and left message on voice mail asking patient to call back. Call back instructions reviewed.

## 2025-02-28 ENCOUNTER — PATIENT OUTREACH (OUTPATIENT)
Dept: HEMATOLOGY/ONCOLOGY | Facility: HOSPITAL | Age: 25
End: 2025-02-28
Payer: MEDICAID

## 2025-03-01 NOTE — PROGRESS NOTES
I did not get to talk to this patient. Patient was referred by Dr. Dmitriy Dodge for pancytopenia. She does not speak English. Glenis Craig CNP set up transportPatient was referred by Dr. Dmitriy Dodge for pancytopenia. ation for this patient this week using a Tara .   is NP called [facility] [employer] [phone] to schedule rides.      Transportation scheduled for:  Monday 3/3 Heme Appt, patient to be ready for  1hr prior to appt (11:00am), return ride scheduled for 1:45p  time (Ticket #[id number]).

## 2025-03-03 ENCOUNTER — LAB (OUTPATIENT)
Dept: LAB | Facility: HOSPITAL | Age: 25
End: 2025-03-03
Payer: MEDICAID

## 2025-03-03 ENCOUNTER — TELEPHONE (OUTPATIENT)
Dept: HEMATOLOGY/ONCOLOGY | Facility: HOSPITAL | Age: 25
End: 2025-03-03
Payer: MEDICAID

## 2025-03-03 ENCOUNTER — OFFICE VISIT (OUTPATIENT)
Dept: HEMATOLOGY/ONCOLOGY | Facility: HOSPITAL | Age: 25
End: 2025-03-03
Payer: MEDICAID

## 2025-03-03 ENCOUNTER — SOCIAL WORK (OUTPATIENT)
Dept: HEMATOLOGY/ONCOLOGY | Facility: HOSPITAL | Age: 25
End: 2025-03-03
Payer: MEDICAID

## 2025-03-03 VITALS
HEART RATE: 103 BPM | TEMPERATURE: 97.9 F | WEIGHT: 174 LBS | RESPIRATION RATE: 16 BRPM | DIASTOLIC BLOOD PRESSURE: 57 MMHG | OXYGEN SATURATION: 100 % | SYSTOLIC BLOOD PRESSURE: 110 MMHG | BODY MASS INDEX: 27.25 KG/M2

## 2025-03-03 DIAGNOSIS — D69.6 THROMBOCYTOPENIA (CMS-HCC): ICD-10-CM

## 2025-03-03 DIAGNOSIS — D50.9 IRON DEFICIENCY ANEMIA, UNSPECIFIED IRON DEFICIENCY ANEMIA TYPE: ICD-10-CM

## 2025-03-03 DIAGNOSIS — B19.10 HEPATITIS B AFFECTING PREGNANCY (HHS-HCC): ICD-10-CM

## 2025-03-03 DIAGNOSIS — D61.818 PANCYTOPENIA: ICD-10-CM

## 2025-03-03 DIAGNOSIS — O26.893 ABDOMINAL PAIN DURING PREGNANCY IN THIRD TRIMESTER (HHS-HCC): ICD-10-CM

## 2025-03-03 DIAGNOSIS — O98.419: ICD-10-CM

## 2025-03-03 DIAGNOSIS — O98.419 HEPATITIS B AFFECTING PREGNANCY (HHS-HCC): ICD-10-CM

## 2025-03-03 DIAGNOSIS — O98.419 HEPATITIS B AFFECTING PREGNANCY (HHS-HCC): Primary | ICD-10-CM

## 2025-03-03 DIAGNOSIS — O46.90 VAGINAL BLEEDING DURING PREGNANCY, ANTEPARTUM (HHS-HCC): ICD-10-CM

## 2025-03-03 DIAGNOSIS — Z3A.32 32 WEEKS GESTATION OF PREGNANCY (HHS-HCC): ICD-10-CM

## 2025-03-03 DIAGNOSIS — O34.219 HISTORY OF CESAREAN DELIVERY AFFECTING PREGNANCY (HHS-HCC): ICD-10-CM

## 2025-03-03 DIAGNOSIS — R10.9 ABDOMINAL PAIN DURING PREGNANCY IN THIRD TRIMESTER (HHS-HCC): ICD-10-CM

## 2025-03-03 DIAGNOSIS — B19.10 HEPATITIS B AFFECTING PREGNANCY (HHS-HCC): Primary | ICD-10-CM

## 2025-03-03 DIAGNOSIS — B18.1: ICD-10-CM

## 2025-03-03 PROCEDURE — 86705 HEP B CORE ANTIBODY IGM: CPT

## 2025-03-03 PROCEDURE — 85610 PROTHROMBIN TIME: CPT

## 2025-03-03 PROCEDURE — 87340 HEPATITIS B SURFACE AG IA: CPT

## 2025-03-03 PROCEDURE — 85670 THROMBIN TIME PLASMA: CPT

## 2025-03-03 PROCEDURE — 86804 HEP C AB TEST CONFIRM: CPT

## 2025-03-03 PROCEDURE — 99215 OFFICE O/P EST HI 40 MIN: CPT | Performed by: STUDENT IN AN ORGANIZED HEALTH CARE EDUCATION/TRAINING PROGRAM

## 2025-03-03 PROCEDURE — 86147 CARDIOLIPIN ANTIBODY EA IG: CPT

## 2025-03-03 PROCEDURE — 87389 HIV-1 AG W/HIV-1&-2 AB AG IA: CPT

## 2025-03-03 PROCEDURE — 86146 BETA-2 GLYCOPROTEIN ANTIBODY: CPT

## 2025-03-03 PROCEDURE — 99205 OFFICE O/P NEW HI 60 MIN: CPT | Performed by: STUDENT IN AN ORGANIZED HEALTH CARE EDUCATION/TRAINING PROGRAM

## 2025-03-03 PROCEDURE — 86707 HEPATITIS BE ANTIBODY: CPT

## 2025-03-03 PROCEDURE — 82607 VITAMIN B-12: CPT

## 2025-03-03 PROCEDURE — 80053 COMPREHEN METABOLIC PANEL: CPT

## 2025-03-03 PROCEDURE — 85247 CLOT FACTOR VIII MULTIMETRIC: CPT

## 2025-03-03 PROCEDURE — 86708 HEPATITIS A ANTIBODY: CPT

## 2025-03-03 PROCEDURE — 86706 HEP B SURFACE ANTIBODY: CPT

## 2025-03-03 PROCEDURE — 85730 THROMBOPLASTIN TIME PARTIAL: CPT

## 2025-03-03 PROCEDURE — 85379 FIBRIN DEGRADATION QUANT: CPT

## 2025-03-03 PROCEDURE — 85384 FIBRINOGEN ACTIVITY: CPT

## 2025-03-03 PROCEDURE — 87350 HEPATITIS BE AG IA: CPT

## 2025-03-03 ASSESSMENT — PAIN SCALES - GENERAL: PAINLEVEL_OUTOF10: 0-NO PAIN

## 2025-03-03 NOTE — Clinical Note
March 3, 2025     Patient: Ramez Stoddard   YOB: 2000   Date of Visit: 3/3/2025       To Whom It May Concern:    Ramez Stoddard was seen in my clinic on 3/3/2025 at 12:00 pm. Please excuse Ramez for her absence from work on this day to make the appointment.    If you have any questions or concerns, please don't hesitate to call.         Sincerely,         Rocco Booker MD        CC: No Recipients

## 2025-03-03 NOTE — TELEPHONE ENCOUNTER
RN called and spoke to patient's  Wilmar. RN had called the lab because labs were not drawn yet and the lab said the patient left the lab because she did not want more than 1 tube of blood drawn. MD Booker and  were notified and MD said it is very important that patient gets labs drawn since RBC and hgb were low when she got labs checked previously and that can be dangerous to her health. This was explained to patient's  and he said he does not think the patient can come back today but can probably come back in the morning. RN informed  Niki and she said she can set up transportation for the patient.

## 2025-03-03 NOTE — PROGRESS NOTES
"Patient ID: Ramez Stoddard is a 24 y.o. female.  Referring Physician: Shante Elias MD  [id number]  Department of OB/GYN  [place] [id number]  Primary Care Provider: No Assigned PCP Generic Provider, MD  Visit Type: Initial Visit  Diagnosis: ***      Subjective    HPI  24 y.o. female with a PMH significant for   female at 29w0d without prenatal care with PMH of chronic hepatitis B, thrombocytopenia, leukopenia, and stillbirth presenting for pancytopenia that was found on  after she came into  ED with abdominal pain and decreased fetal movement.   Gynecologist, does not know.     Has been pregnant 3 times,   Hep B diagnosed during first pregnancy not treated.      ID: [id number], [id number]    Re pancytopenia:   Patient is immigrant from [facility], [place] . Upon review of her blood work, she was found to be pancytopenic as below with WBC of 3.0, Hgb of 8.3, and Plt of 40. The patient was attempted to be called for repeat evaluation in the hospital though did not present. Blood work was also significant for Hep B Ag positivity on  for which she has a history.      She was last seen for her Hep B infection by ID in 2023 at which point it was consistent with a chronic infection and she denied ever having been treated for Hep B in the past. She was seen by hematology at [facility] in 3/2024. Work up revealed low zinc 49ug/dL. B12, folate and copper not deficient. Ultrasound 2024 with normal appearing liver and splenomegaly 16.9cm.      She then presented to  L&D for contractions in 2024 and was found to have an IUFD at 39.3wks that was complicated by a placental abruption. She has not had a period since that delivery and believes she got pregnant in 2024.      History otherwise significant for hx of Cdx1 in [place] because her baby was \"too big\" and pelvic vs horseshoe kidney.      Interview conducted with .      Patient reports she is feeling " well. Currently, she is reporting some itching but no rashes. Otherwise denies history of easy bruising, nose bleeds/gum bleeding, melena, hematochezia, or hematuria. She does report that she has lost about 4kg of weight since the start of pregnancy (about 8 pounds). She denies any fatigue or pain. She is feeling fetal movement.      She is not on a prenatal vitamin and only eats meat 3 times a month. She said malaria is common in [place], but she has not had recent cyclic fevers and fevers and chills. No NSAID use, and or exposure to lakes from when she was in [facility]. She said her stool is white.     Reports menstrual cycles are typically ~3 days in length. Denies excessive bleeding or clots during periods. 1 prior history of c section, denies excessive bleeding or poor wound healing related to this procedure.      Age appropriate cancer screening: ***  FMH: ***  Social history: ***    Review of Systems - Oncology  10 point review of systems negative except as stated in HPI    Objective   Past Surgical History:   Procedure Laterality Date   •  SECTION, LOW TRANSVERSE       Social History     Socioeconomic History   • Marital status: Unknown   Tobacco Use   • Smoking status: Never   • Smokeless tobacco: Never   Vaping Use   • Vaping status: Never Used   Substance and Sexual Activity   • Alcohol use: Never   • Drug use: Never     Social Drivers of Health     Financial Resource Strain: Low Risk  (2025)    Overall Financial Resource Strain (CARDIA)    • Difficulty of Paying Living Expenses: Not very hard   Food Insecurity: No Food Insecurity (2025)    Hunger Vital Sign    • Worried About Running Out of Food in the Last Year: Never true    • Ran Out of Food in the Last Year: Never true   Transportation Needs: No Transportation Needs (2025)    PRAPARE - Transportation    • Lack of Transportation (Medical): No    • Lack of Transportation (Non-Medical): No   Physical Activity: Not on File (2023)     Received from [employer]    Physical Activity    • Physical Activity: 0   Stress: Not on File (7/12/2023)    Received from [employer]    Stress    • Stress: 0   Social Connections: Not on File (9/19/2024)    Received from [employer]    Social Connections    • Connectedness: 0   Intimate Partner Violence: Not At Risk (2/19/2025)    Humiliation, Afraid, Rape, and Kick questionnaire    • Fear of Current or Ex-Partner: No    • Emotionally Abused: No    • Physically Abused: No    • Sexually Abused: No      reports that she has never smoked. She has never used smokeless tobacco.  She  reports no history of alcohol use.  She  reports no history of drug use.  No family history on file.  Oncology History    No history exists.       BSA: There is no height or weight on file to calculate BSA. There were no vitals taken for this visit.  Physical Exam    Assessment/Plan    24 y.o. female with a PMH significant for ***.     # ***  Plan:  - ***  - follow up next: ***  - labs prior to follow up: ***    # ***  Plan:  - ***  - follow up next: ***  - labs prior to follow up: ***    Rocco Booker MD     would recommend short course 30mg prednisone, for 4 days, then stop, as a pulse. Close monitoring of blood sugars as relates to hyperemesis and steroids. With a count check on the 5th or 6th day  -- I and other providers have ordered ultrasounds of her abd to clarify the state of her liver disease but they are yet to be done, I encouraged the pt during visit and also had our SW meet with her regarding the same.   -- she will need a referral to hepatology once abdominal imaging is done, and infectious disease.   - it is not clear whether the KELLI is relevant at this time.   - have brought the hyperemesis issue to the attention of her gynecologist.   - follow up next: weekly count checks, ok to do a trial of prednisone   - labs prior to follow up: 1 month       Rocco Booker MD

## 2025-03-04 ENCOUNTER — TELEPHONE (OUTPATIENT)
Dept: HEMATOLOGY/ONCOLOGY | Facility: CLINIC | Age: 25
End: 2025-03-04

## 2025-03-04 ENCOUNTER — LAB (OUTPATIENT)
Dept: LAB | Facility: HOSPITAL | Age: 25
End: 2025-03-04
Payer: MEDICAID

## 2025-03-04 DIAGNOSIS — D69.6 THROMBOCYTOPENIA AFFECTING PREGNANCY, ANTEPARTUM (MULTI): Primary | ICD-10-CM

## 2025-03-04 DIAGNOSIS — O98.419 HEPATITIS B AFFECTING PREGNANCY (HHS-HCC): ICD-10-CM

## 2025-03-04 DIAGNOSIS — O99.119 THROMBOCYTOPENIA AFFECTING PREGNANCY, ANTEPARTUM (MULTI): Primary | ICD-10-CM

## 2025-03-04 DIAGNOSIS — O99.119 THROMBOCYTOPENIA AFFECTING PREGNANCY, ANTEPARTUM (MULTI): ICD-10-CM

## 2025-03-04 DIAGNOSIS — D69.6 THROMBOCYTOPENIA (CMS-HCC): ICD-10-CM

## 2025-03-04 DIAGNOSIS — D61.818 PANCYTOPENIA: ICD-10-CM

## 2025-03-04 DIAGNOSIS — B19.10 HEPATITIS B AFFECTING PREGNANCY (HHS-HCC): ICD-10-CM

## 2025-03-04 DIAGNOSIS — D69.6 THROMBOCYTOPENIA AFFECTING PREGNANCY, ANTEPARTUM (MULTI): ICD-10-CM

## 2025-03-04 PROBLEM — Z75.8: Status: ACTIVE | Noted: 2025-03-04

## 2025-03-04 PROBLEM — Z86.59 HISTORY OF POSTPARTUM DEPRESSION, CURRENTLY PREGNANT (HHS-HCC): Status: ACTIVE | Noted: 2024-08-09

## 2025-03-04 PROBLEM — O99.891 HISTORY OF POSTPARTUM DEPRESSION, CURRENTLY PREGNANT (HHS-HCC): Status: ACTIVE | Noted: 2024-08-09

## 2025-03-04 PROBLEM — Z3A.31 31 WEEKS GESTATION OF PREGNANCY (HHS-HCC): Status: ACTIVE | Noted: 2025-02-15

## 2025-03-04 LAB
ABO GROUP (TYPE) IN BLOOD: NORMAL
ALBUMIN SERPL BCP-MCNC: 3.3 G/DL (ref 3.4–5)
ALP SERPL-CCNC: 59 U/L (ref 33–110)
ALT SERPL W P-5'-P-CCNC: 15 U/L (ref 7–45)
ANION GAP SERPL CALC-SCNC: 11 MMOL/L (ref 10–20)
ANTIBODY SCREEN: NORMAL
APTT PPP: 28 SECONDS (ref 26–36)
AST SERPL W P-5'-P-CCNC: 23 U/L (ref 9–39)
B2 GLYCOPROT1 IGA SER-ACNC: <0.6 U/ML
B2 GLYCOPROT1 IGG SER-ACNC: 1.6 U/ML
B2 GLYCOPROT1 IGM SER-ACNC: 1.5 U/ML
BASOPHILS # BLD AUTO: 0.01 X10*3/UL (ref 0–0.1)
BASOPHILS NFR BLD AUTO: 0.4 %
BILIRUB SERPL-MCNC: 0.9 MG/DL (ref 0–1.2)
BUN SERPL-MCNC: 4 MG/DL (ref 6–23)
CALCIUM SERPL-MCNC: 7.9 MG/DL (ref 8.6–10.3)
CARDIOLIPIN IGA SERPL-ACNC: 0.5 APL U/ML
CARDIOLIPIN IGG SER IA-ACNC: <1.6 GPL U/ML
CARDIOLIPIN IGM SER IA-ACNC: 0.2 MPL U/ML
CHLORIDE SERPL-SCNC: 107 MMOL/L (ref 98–107)
CO2 SERPL-SCNC: 23 MMOL/L (ref 21–32)
CREAT SERPL-MCNC: 0.39 MG/DL (ref 0.5–1.05)
D DIMER PPP FEU-MCNC: 7583 NG/ML FEU
DAT-POLYSPECIFIC: NORMAL
EGFRCR SERPLBLD CKD-EPI 2021: >90 ML/MIN/1.73M*2
EOSINOPHIL # BLD AUTO: 0.12 X10*3/UL (ref 0–0.7)
EOSINOPHIL NFR BLD AUTO: 4.9 %
ERYTHROCYTE [DISTWIDTH] IN BLOOD BY AUTOMATED COUNT: 17.8 % (ref 11.5–14.5)
FERRITIN SERPL-MCNC: 34 NG/ML (ref 8–150)
FIBRINOGEN PPP-MCNC: 214 MG/DL (ref 200–400)
GLUCOSE SERPL-MCNC: 70 MG/DL (ref 74–99)
HAPTOGLOB SERPL NEPH-MCNC: <30 MG/DL (ref 30–200)
HAV AB SER QL IA: REACTIVE
HBV CORE IGM SER QL: NONREACTIVE
HBV SURFACE AB SER-ACNC: <3.1 MIU/ML
HCT VFR BLD AUTO: 27.3 % (ref 36–46)
HGB BLD-MCNC: 8 G/DL (ref 12–16)
HIV 1+2 AB+HIV1 P24 AG SERPL QL IA: NONREACTIVE
IMM GRANULOCYTES # BLD AUTO: 0.02 X10*3/UL (ref 0–0.7)
IMM GRANULOCYTES NFR BLD AUTO: 0.8 % (ref 0–0.9)
INR PPP: 1.1 (ref 0.9–1.1)
IRON SATN MFR SERPL: ABNORMAL %
IRON SERPL-MCNC: 33 UG/DL (ref 35–150)
LDH SERPL L TO P-CCNC: 196 U/L (ref 84–246)
LYMPHOCYTES # BLD AUTO: 0.95 X10*3/UL (ref 1.2–4.8)
LYMPHOCYTES NFR BLD AUTO: 38.6 %
MCH RBC QN AUTO: 23.1 PG (ref 26–34)
MCHC RBC AUTO-ENTMCNC: 29.3 G/DL (ref 32–36)
MCV RBC AUTO: 79 FL (ref 80–100)
MONOCYTES # BLD AUTO: 0.07 X10*3/UL (ref 0.1–1)
MONOCYTES NFR BLD AUTO: 2.8 %
NEUTROPHILS # BLD AUTO: 1.29 X10*3/UL (ref 1.2–7.7)
NEUTROPHILS NFR BLD AUTO: 52.5 %
NRBC BLD-RTO: 0 /100 WBCS (ref 0–0)
PLATELET # BLD AUTO: 74 X10*3/UL (ref 150–450)
PLATELETS.RETICULATED NFR BLD AUTO: 9.3 % (ref 1–6)
POTASSIUM SERPL-SCNC: 3.8 MMOL/L (ref 3.5–5.3)
PROT SERPL-MCNC: 5.9 G/DL (ref 6.4–8.2)
PROTHROMBIN TIME: 11.6 SECONDS (ref 9.8–12.4)
RBC # BLD AUTO: 3.47 X10*6/UL (ref 4–5.2)
RH FACTOR (ANTIGEN D): NORMAL
SODIUM SERPL-SCNC: 137 MMOL/L (ref 136–145)
THROMBIN TIME: 17.9 SECONDS (ref 10.3–16.6)
TIBC SERPL-MCNC: ABNORMAL UG/DL
UIBC SERPL-MCNC: >450 UG/DL (ref 110–370)
VIT B12 SERPL-MCNC: 652 PG/ML (ref 211–911)
WBC # BLD AUTO: 2.5 X10*3/UL (ref 4.4–11.3)

## 2025-03-04 PROCEDURE — 86850 RBC ANTIBODY SCREEN: CPT

## 2025-03-04 PROCEDURE — 36415 COLL VENOUS BLD VENIPUNCTURE: CPT

## 2025-03-04 PROCEDURE — 83010 ASSAY OF HAPTOGLOBIN QUANT: CPT

## 2025-03-04 PROCEDURE — 83540 ASSAY OF IRON: CPT

## 2025-03-04 PROCEDURE — 86901 BLOOD TYPING SEROLOGIC RH(D): CPT

## 2025-03-04 PROCEDURE — 85397 CLOTTING FUNCT ACTIVITY: CPT

## 2025-03-04 PROCEDURE — 82728 ASSAY OF FERRITIN: CPT

## 2025-03-04 PROCEDURE — 85025 COMPLETE CBC W/AUTO DIFF WBC: CPT

## 2025-03-04 PROCEDURE — 85613 RUSSELL VIPER VENOM DILUTED: CPT | Performed by: NURSE PRACTITIONER

## 2025-03-04 PROCEDURE — 83615 LACTATE (LD) (LDH) ENZYME: CPT

## 2025-03-04 PROCEDURE — 88189 FLOWCYTOMETRY/READ 16 & >: CPT | Performed by: STUDENT IN AN ORGANIZED HEALTH CARE EDUCATION/TRAINING PROGRAM

## 2025-03-04 PROCEDURE — 86038 ANTINUCLEAR ANTIBODIES: CPT

## 2025-03-04 PROCEDURE — 82525 ASSAY OF COPPER: CPT

## 2025-03-04 PROCEDURE — 86235 NUCLEAR ANTIGEN ANTIBODY: CPT

## 2025-03-04 PROCEDURE — 88185 FLOWCYTOMETRY/TC ADD-ON: CPT | Mod: TC

## 2025-03-04 PROCEDURE — 85055 RETICULATED PLATELET ASSAY: CPT

## 2025-03-04 PROCEDURE — 86880 COOMBS TEST DIRECT: CPT

## 2025-03-04 PROCEDURE — 87517 HEPATITIS B DNA QUANT: CPT | Performed by: STUDENT IN AN ORGANIZED HEALTH CARE EDUCATION/TRAINING PROGRAM

## 2025-03-04 NOTE — PROGRESS NOTES
Social Work Note    Referral Source: NATHAN Booker MD  Meeting Location: In-Person  Person(s) Present: Pt, pt's son, Dr. Booker, [employer] [facility]    Identified Needs: Care Coordination/Transportation  Impression and Plan: LISW joined pt's new pt meeting with Dr. Booker. Dr. Booker expressed concerns with pt's ability to navigate health system due to language barrier. Pt is currently pregnant and has had limited prenatal care. Dr. Booker reports concerns with pt's ability to attend possible infusion treatments due to transportation and childcare. LISW discussed concerns with pt. Per pt, childcare is not an issue and understands son would not be able to attend treatment appointments. Pt requests assistance with transportation. Pt states their insurance ride, arranged by [facility] coordinator, did not arrive this morning. Pt transported self via uber and is requesting assistance with ride home. Additionally, pt states they receive good support from partner, Wilmar and requests all communications regarding her care go through him. Patient denies any further psychosocial or support service needs at this time. Patient encouraged to contact LISW if any needs arise.     After visit, SW was informed pt left without getting labs due to concerns with amount of blood work. RN contacted pt's partner, Wilmar, who states pt can come back tomorrow for necessary labs. LISW to arrange roundtrip ride.   Interventions Provided: Assessment, Care Coordination, and Supportive Counseling  Estimated Time Spent: 45 min    Transportation Referral     Referral Source: NATHAN Booker MD  Multiple Rides Needed? Yes - missed labs  First Date Transportation is needed? 3/3/25  Ambulation: Independently  Pick-up Address: [address] [place] [address]  Patient Phone: [phone]  Accompaniment: Yes, Pt's son Ambulation?: Independently  Phone Receives Text Messages? Yes  Transportation Service: Roundtrip ( p:[phone]) , Reason:  same day & next day apts      Scheduled Ride(s):     Date: 3/3/25   Appointment: Hematology NPV   Drop off Address:  Home Address   Time: 2:00pm   Return Ride: n/a-one way    Confirmation: will call    Date: 3/4/25   Appointment: Lab appointment   Drop off Address: [facility]: [address] [place] [address]   Time: 9am   Return Ride: will call   Confirmation: email           SW will continue to follow as needed.

## 2025-03-05 PROBLEM — Z14.8 GENETIC CARRIER STATUS: Status: ACTIVE | Noted: 2025-03-05

## 2025-03-05 PROBLEM — R76.8 HEPATITIS A ANTIBODY POSITIVE: Status: ACTIVE | Noted: 2025-03-05

## 2025-03-05 LAB
CELL COUNT (BLOOD): 2.5 X10*3/UL
CELL POPULATIONS: NORMAL
DIAGNOSIS: NORMAL
FLOW DIFFERENTIAL: NORMAL
FLOW TEST ORDERED: NORMAL
HBV E AB SERPL QL IA: NEGATIVE
HBV E AG SERPL QL IA: POSITIVE
LAB TEST METHOD: NORMAL
NUMBER OF CELLS COLLECTED: NORMAL PER TUBE
PATH REPORT.TOTAL CANCER: NORMAL
SIGNATURE COMMENT: NORMAL
SPECIMEN VIABILITY: NORMAL

## 2025-03-06 ENCOUNTER — DOCUMENTATION (OUTPATIENT)
Dept: OBSTETRICS AND GYNECOLOGY | Facility: HOSPITAL | Age: 25
End: 2025-03-06
Payer: MEDICAID

## 2025-03-06 ENCOUNTER — TELEPHONE (OUTPATIENT)
Dept: OBSTETRICS AND GYNECOLOGY | Facility: HOSPITAL | Age: 25
End: 2025-03-06
Payer: MEDICAID

## 2025-03-06 LAB
ANA PATTERN: ABNORMAL
ANA SER QL HEP2 SUBST: POSITIVE
ANA TITR SER IF: ABNORMAL {TITER}
CENTROMERE B AB SER-ACNC: <0.2 AI
CHROMATIN AB SERPL-ACNC: <0.2 AI
COMMENTS - MP RESULT TYPE: NORMAL
COMMENTS - MP RESULT TYPE: NORMAL
COPPER SERPL-MCNC: 152 UG/DL (ref 80–155)
DRVVT SCREEN TO CONFIRM RATIO: 0.71 RATIO
DRVVT/DRVVT CFM NRMLZD PPP-RTO: 0.89 RATIO
DRVVT/DRVVT CFM P DOAC NEUT NORM PPP-RTO: 0.8 RATIO
DSDNA AB SER-ACNC: <1 IU/ML
ENA JO1 AB SER QL IA: <0.2 AI
ENA RNP AB SER IA-ACNC: <0.2 AI
ENA SCL70 AB SER QL IA: 2.3 AI
ENA SM AB SER IA-ACNC: <0.2 AI
ENA SM+RNP AB SER QL IA: <0.2 AI
ENA SS-A AB SER IA-ACNC: <0.2 AI
ENA SS-B AB SER IA-ACNC: <0.2 AI
HBV DNA SERPL NAA+PROBE-ACNC: ABNORMAL IU/ML
HBV DNA SERPL NAA+PROBE-ACNC: DETECTED [IU]/ML
HBV DNA SERPL NAA+PROBE-LOG IU: 4.3 LOG IU/ML
LA 2 SCREEN W REFLEX-IMP: NORMAL
NORMALIZED SCT PPP-RTO: 0.58 RATIO
RIBOSOMAL P AB SER-ACNC: 0.3 AI
SCAN RESULT: NORMAL
SCAN RESULT: NORMAL
SILICA CLOTTING TIME CONFIRMATION: 1.13 RATIO
SILICA CLOTTING TIME SCREEN: 0.66 RATIO

## 2025-03-06 NOTE — TELEPHONE ENCOUNTER
Called patient, to assist with rescheduling OB appt.   Person who answered, stated he was not near her at this time but reported he would be home at 12:30 today.   This NP will call back after that time.   Will continue to be available for coordination of care.     Glenis Craig [facility]  Complex/High Risk OB   [phone]

## 2025-03-06 NOTE — TELEPHONE ENCOUNTER
This NP attempted to call again with .  No answer,  left message on behalf of this NP stating this NP will attempt to call patient again tomorrow at 12:30.   Will continue to be available for coordination of care.     Glenis Craig CNP  Complex/High Risk OB   [phone]

## 2025-03-06 NOTE — PROGRESS NOTES
Labs reviewed and SMA indeterminate. Patient missed last visit when was going to review, has barriers to care including language barrier. Review at next visit though given other clinical needs, including hematologic, and gestational age genetics consultation may not be logistically feasible prior to delivery.

## 2025-03-07 ENCOUNTER — TELEPHONE (OUTPATIENT)
Dept: OBSTETRICS AND GYNECOLOGY | Facility: HOSPITAL | Age: 25
End: 2025-03-07
Payer: MEDICAID

## 2025-03-07 NOTE — TELEPHONE ENCOUNTER
"Called patient to review appt for next week and arrange transportation.   Spoke with Brother (DAMIAN), Ramez not at home at this time.   Brother aware of appt, this NP confirmed she would arrange transportation and call back Monday to confirm appt details/transportation.   Called [employer] transportation, ride scheduled for 3/12 appt (Reservation ID #[id number]).    Of note, transportation schedulers mentioned rides have been canceled by the system due to patient being \"ineligible\", which  confirmed is untrue.  This NP will call back on Monday to confirm ride is scheduled and no cancellations made.  If rides continue to be canceled, this NP will schedule ride through round trip, brother aware to inform Ramez of plan.   Will continue to follow care and coordinate as needed.     Glenis Craig CNP  Complex/High Risk OB   [phone]  "

## 2025-03-09 LAB
HBV SURFACE AG SER DONR QL IA: POSITIVE
HBV SURFACE AG SERPL QL NT: ABNORMAL
HCV AB SER DONR QL IA: NEGATIVE

## 2025-03-10 ENCOUNTER — TELEPHONE (OUTPATIENT)
Dept: OBSTETRICS AND GYNECOLOGY | Facility: HOSPITAL | Age: 25
End: 2025-03-10
Payer: MEDICAID

## 2025-03-10 NOTE — ASSESSMENT & PLAN NOTE
-s/p Heme consult on 3/3  -Most recent blood work: WBC 2.5, Hgb 8.0, Plt 74 from 3/3   -s/p IV sucrose. Hematology planning more infusions  -thrombocytopenia possibly from splenic sequestration vs liver dysfunction vs ITP. Continue to follow with heme. Liver and spleen ultrasound to be performed.

## 2025-03-10 NOTE — TELEPHONE ENCOUNTER
Called patient after this NP confirmed transportation still appropriately scheduled and  time adjusted to accommodate 3/12 follow up ultrasound (requested by Dr. Mcgarry).   This NP asked if Ramez was available, brother (DAMIAN) stated he would pass along appt info and confirmed transportation information received via text.   Instructed brother for either him or Ramez to please call my direct line if transportation canceled and this NP will arrange an alternative ride.   This NP will also follow up with transportation company tomorrow to confirm ride is still scheduled appropriately.    Glenis Craig, [facility]  Complex/High Risk OB   [phone]

## 2025-03-10 NOTE — PROGRESS NOTES
[facility] Follow-up  3/12/2025   2:46 PM     SUBJECTIVE - interview was conducted with video      HPI: Ramez Stoddard is a 24 y.o.  at 32w0d here for RPNV. Denies contractions, bleeding, or LOF. Reports normal fetal movement. Patient reports she is feeling well.    She does report nausea and vomiting. She states that she is vomiting once in the morning though the nausea resists throughout the night. She reports she is unable to eat anything. She ate something yesterday and has lost about 4 pounds since her last visit on 3/3. She is not taking any for nausea.     Patient was last seen in clinic on  at which time she was admitted from clinic to inpatient for work up of pancytopenia. She left AMA on  from the hospital and returned for hematology consult on . Hematology recommended IV sucrose, which she received, follow up as an outpatient with RUQ US and hematology appointment. During her time in the hospital she received 200mg IV sucrose, labs were drawn and remarkable for rr cfDNA, HBV DNA of 169K, normal 1hr gtt, and negative APLS labs. Given the elevation of the HBV DNA without prior treatment and unknown control, she was started on tenofovir 300mg daily. She was subsequently discharged home on  with follow up. She is taking the tenofovir every day and is tolerating it well.     She was seen by hematology as an outpatient on 3/3 with recommendations for repeat RUQ US, continuation of po iron, and blood work. Blood work is partially resulted with new findings of Hep A antibody total positive and CBC showing WBC 2.5, Hgb 8.0, Plt 74. She reports she is not taking any iron pills.    OBJECTIVE    Visit Vitals  /67 Comment: 96   Wt 77.5 kg (170 lb 15.4 oz)   BMI 26.78 kg/m²   OB Status Pregnant   Smoking Status Never   BSA 1.91 m²      General - in no acute distress, resting comfortably  CV - regular rate   Resp - non labored on room air, normal effort   Abd - gravid, soft, no  obvious masses   Skin - no rashes   FHT: obtained via bedside doppler     ASSESSMENT & PLAN    Ramez Stoddard is a 24 y.o.  at 32w0d here for the following concerns we addressed today:    Assessment & Plan  Yi  needed  - a Yi video  was used throughout the duration of the visit       Genetic carrier status  -Carrier screening collected during inpatient stay   -SMA carrier status is unknown with 2 copies of SMN1 gene and SNP   -genetic counseling referral today  Orders:    Referral to Genetics; Future    Hepatitis A antibody positive  -Labs per hematology showing positive Hep A Ab total, differentiation between IgG and IgM is not made   -LFTs WNL   -We discussed Hepatitis A infection in pregnancy is typically self limited, if acute, and treatment is supportive care. We reviewed there has been association between acute HAV infection and  labor, PPROM, and  transmission to the fetus. In the setting of an acute infection and delivery within 2 weeks of diagnosis, the CDC recommends HAV immunoglobulin administration to the . She is having symptoms of HAV infection of nausea/vomiting with associated weight loss, though this can also be attributed to pregnancy. In the setting of normal LFTs, I suspect an acute infection is unlikely. Hep A IgM was ordered today. We discussed supportive cares.   Orders:    Hepatitis A Antibody, IgM; Future    Referral to Hepatology; Future    Hepatitis B complicating pregnancy in second trimester (HHS-HCC)  -Started on Tenofovir 300mg daily on  tolerating well   -Hep B ,000 () > 20,100 (3/4)   -RUQ US ordered and not yet scheduled   -Given low platelets and liver pathology with Hep B & Hep A plus splenomegaly, need to assess for portal hypertension hence RUQ US. Will coordinate scheduling today  Orders:    Referral to Hepatology; Future    tenofovir disoproxil fumarate (Viread) 300 mg tablet; Take  1 tablet (300 mg) by mouth once daily.    32 weeks gestation of pregnancy ([facility])  -planing TOLAC at 39 weeks  -growth US today       History of IUFD  -Weekly NSTs to start at 32 weeks - BPP today after appointment  -rr cfDNA & negative APLS       History of  delivery, currently pregnant ([facility])  -planning TOLAC   -hx of  in setting of unknown uterine scar location       Pancytopenia  -s/p Heme consult on 3/3  -Most recent blood work: WBC 2.5, Hgb 8.0, Plt 74 from 3/3   -s/p IV sucrose. Hematology planning more infusions  -thrombocytopenia possibly from splenic sequestration vs liver dysfunction vs ITP. Continue to follow with heme. Liver and spleen ultrasound to be performed.       Nausea and vomiting during pregnancy  -4 pound weight loss since last visit on 3/3   -Zofran Rx today   Orders:    ondansetron ODT (Zofran-ODT) 8 mg disintegrating tablet; Dissolve 1 tablet (8 mg) in the mouth every 8 hours if needed for nausea or vomiting.    Fibrinogen decreased (Multi)  -Patient is at increased risk of OB bleeding. If bleeding during delivery, recommend giving FFP & cryo for factor replacement and hemorrhage reversal   -last fibrinogen on 3/4 of 214 (anything below 300 is abnormal in pregnancy)         RTC in 2 weeks    Patient seen and evaluated with Dr. Karl Mcgarry MD

## 2025-03-10 NOTE — ASSESSMENT & PLAN NOTE
-Carrier screening collected during inpatient stay   -SMA carrier status is unknown with 2 copies of SMN1 gene and SNP   -genetic counseling referral today  Orders:    Referral to Genetics; Future

## 2025-03-10 NOTE — ASSESSMENT & PLAN NOTE
-Labs per hematology showing positive Hep A Ab total, differentiation between IgG and IgM is not made   -LFTs WNL   -We discussed Hepatitis A infection in pregnancy is typically self limited, if acute, and treatment is supportive care. We reviewed there has been association between acute HAV infection and  labor, PPROM, and  transmission to the fetus. In the setting of an acute infection and delivery within 2 weeks of diagnosis, the CDC recommends HAV immunoglobulin administration to the . She is having symptoms of HAV infection of nausea/vomiting with associated weight loss, though this can also be attributed to pregnancy. In the setting of normal LFTs, I suspect an acute infection is unlikely. Hep A IgM was ordered today. We discussed supportive cares.   Orders:    Hepatitis A Antibody, IgM; Future    Referral to Hepatology; Future

## 2025-03-10 NOTE — ASSESSMENT & PLAN NOTE
-Started on Tenofovir 300mg daily on 2/20 tolerating well   -Hep B ,000 (2/19) > 20,100 (3/4)   -RUQ US ordered and not yet scheduled   -Given low platelets and liver pathology with Hep B & Hep A plus splenomegaly, need to assess for portal hypertension hence RUQ US. Will coordinate scheduling today  Orders:    Referral to Hepatology; Future    tenofovir disoproxil fumarate (Viread) 300 mg tablet; Take 1 tablet (300 mg) by mouth once daily.

## 2025-03-11 ENCOUNTER — TELEPHONE (OUTPATIENT)
Dept: OBSTETRICS AND GYNECOLOGY | Facility: HOSPITAL | Age: 25
End: 2025-03-11
Payer: MEDICAID

## 2025-03-11 NOTE — TELEPHONE ENCOUNTER
Called patient to confirm no transportation cancellations received.   Brother (DAMIAN) answered and confirmed no cancellations at this time, understands to call if any issues tomorrow.   Will continue to follow care and be available for coordination as needed.     Glenis Craig CNP  Complex/High Risk OB   [phone]

## 2025-03-12 ENCOUNTER — ROUTINE PRENATAL (OUTPATIENT)
Dept: MATERNAL FETAL MEDICINE | Facility: CLINIC | Age: 25
End: 2025-03-12
Payer: MEDICAID

## 2025-03-12 ENCOUNTER — HOSPITAL ENCOUNTER (OUTPATIENT)
Dept: RADIOLOGY | Facility: CLINIC | Age: 25
Discharge: HOME | End: 2025-03-12
Payer: MEDICAID

## 2025-03-12 ENCOUNTER — PHARMACY VISIT (OUTPATIENT)
Dept: PHARMACY | Facility: CLINIC | Age: 25
End: 2025-03-12
Payer: MEDICAID

## 2025-03-12 ENCOUNTER — TELEPHONE (OUTPATIENT)
Dept: GENETICS | Facility: CLINIC | Age: 25
End: 2025-03-12

## 2025-03-12 VITALS
SYSTOLIC BLOOD PRESSURE: 105 MMHG | DIASTOLIC BLOOD PRESSURE: 67 MMHG | BODY MASS INDEX: 26.78 KG/M2 | WEIGHT: 170.96 LBS

## 2025-03-12 DIAGNOSIS — Z3A.31 31 WEEKS GESTATION OF PREGNANCY (HHS-HCC): ICD-10-CM

## 2025-03-12 DIAGNOSIS — Z87.59 HISTORY OF IUFD: ICD-10-CM

## 2025-03-12 DIAGNOSIS — B19.10: Primary | ICD-10-CM

## 2025-03-12 DIAGNOSIS — Z75.8: ICD-10-CM

## 2025-03-12 DIAGNOSIS — Z14.8 GENETIC CARRIER STATUS: ICD-10-CM

## 2025-03-12 DIAGNOSIS — R76.8 HEPATITIS A ANTIBODY POSITIVE: ICD-10-CM

## 2025-03-12 DIAGNOSIS — Z3A.32 32 WEEKS GESTATION OF PREGNANCY (HHS-HCC): ICD-10-CM

## 2025-03-12 DIAGNOSIS — O21.9 NAUSEA AND VOMITING DURING PREGNANCY: ICD-10-CM

## 2025-03-12 DIAGNOSIS — D61.818 PANCYTOPENIA: ICD-10-CM

## 2025-03-12 DIAGNOSIS — O34.219 HISTORY OF CESAREAN DELIVERY, CURRENTLY PREGNANT (HHS-HCC): ICD-10-CM

## 2025-03-12 DIAGNOSIS — D68.2: ICD-10-CM

## 2025-03-12 DIAGNOSIS — O98.413: Primary | ICD-10-CM

## 2025-03-12 PROBLEM — O40.3XX0 POLYHYDRAMNIOS IN THIRD TRIMESTER (HHS-HCC): Status: RESOLVED | Noted: 2025-02-19 | Resolved: 2025-03-12

## 2025-03-12 LAB — VWF MULTIMERS PPP IB: NORMAL

## 2025-03-12 PROCEDURE — 76816 OB US FOLLOW-UP PER FETUS: CPT

## 2025-03-12 PROCEDURE — 99215 OFFICE O/P EST HI 40 MIN: CPT | Performed by: STUDENT IN AN ORGANIZED HEALTH CARE EDUCATION/TRAINING PROGRAM

## 2025-03-12 PROCEDURE — 76819 FETAL BIOPHYS PROFIL W/O NST: CPT

## 2025-03-12 PROCEDURE — RXMED WILLOW AMBULATORY MEDICATION CHARGE

## 2025-03-12 RX ORDER — ONDANSETRON 8 MG/1
8 TABLET, ORALLY DISINTEGRATING ORAL EVERY 8 HOURS PRN
Qty: 20 TABLET | Refills: 2 | Status: SHIPPED | OUTPATIENT
Start: 2025-03-12 | End: 2025-04-11

## 2025-03-12 RX ORDER — TENOFOVIR DISOPROXIL FUMARATE 300 MG/1
300 TABLET, FILM COATED ORAL DAILY
Qty: 90 TABLET | Refills: 1 | Status: SHIPPED | OUTPATIENT
Start: 2025-03-12 | End: 2025-09-08

## 2025-03-12 RX ORDER — TENOFOVIR DISOPROXIL FUMARATE 300 MG/1
300 TABLET, FILM COATED ORAL DAILY
Qty: 90 TABLET | Refills: 1 | Status: SHIPPED | OUTPATIENT
Start: 2025-03-12 | End: 2025-03-12

## 2025-03-12 RX ORDER — ONDANSETRON 8 MG/1
8 TABLET, ORALLY DISINTEGRATING ORAL EVERY 8 HOURS PRN
Qty: 20 TABLET | Refills: 2 | Status: SHIPPED | OUTPATIENT
Start: 2025-03-12 | End: 2025-03-12

## 2025-03-12 NOTE — ASSESSMENT & PLAN NOTE
-Patient is at increased risk of OB bleeding. If bleeding during delivery, recommend giving FFP & cryo for factor replacement and hemorrhage reversal   -last fibrinogen on 3/4 of 214 (anything below 300 is abnormal in pregnancy)

## 2025-03-13 LAB — HAV IGM SERPL QL IA: NORMAL

## 2025-03-17 ENCOUNTER — TELEPHONE (OUTPATIENT)
Dept: OBSTETRICS AND GYNECOLOGY | Facility: HOSPITAL | Age: 25
End: 2025-03-17
Payer: MEDICAID

## 2025-03-17 NOTE — TELEPHONE ENCOUNTER
Called patient this afternoon, spoke with brother (DAMIAN) about upcoming appts and informed him rides would be scheduled through insurance (see /return ride times and trip # below).   Text sent by this NP with appt times/dates, will call tomorrow to confirm no rides have been canceled.     3/19 NST (Trip # [id number])    time ~12:00p; Return Ride scheduled for 2:15p    3/21 Abdominal US (Trip # [id number])   time ~ 12:30p; Return Ride scheduled for 3p    3/26 NST & MFM Visit (Trip # [id number])   time ~ 12:00p; Return Ride scheduled 2:45p    Will continue to follow care be available for coordination as needed.     Glenis Craig CNP  Complex/High Risk OB   [phone]

## 2025-03-18 PROBLEM — D50.9 IRON DEFICIENCY ANEMIA: Status: ACTIVE | Noted: 2025-03-18

## 2025-03-18 LAB — VWF GP1BM ACTIVITY: 189 IU/DL (ref 52–180)

## 2025-03-18 RX ORDER — DIPHENHYDRAMINE HYDROCHLORIDE 50 MG/ML
50 INJECTION, SOLUTION INTRAMUSCULAR; INTRAVENOUS AS NEEDED
Status: CANCELLED | OUTPATIENT
Start: 2025-03-24

## 2025-03-18 RX ORDER — HEPARIN 100 UNIT/ML
500 SYRINGE INTRAVENOUS AS NEEDED
OUTPATIENT
Start: 2025-03-24

## 2025-03-18 RX ORDER — FAMOTIDINE 10 MG/ML
20 INJECTION, SOLUTION INTRAVENOUS ONCE AS NEEDED
Status: CANCELLED | OUTPATIENT
Start: 2025-03-24

## 2025-03-18 RX ORDER — EPINEPHRINE 0.3 MG/.3ML
0.3 INJECTION SUBCUTANEOUS EVERY 5 MIN PRN
Status: CANCELLED | OUTPATIENT
Start: 2025-03-24

## 2025-03-18 RX ORDER — ALBUTEROL SULFATE 0.83 MG/ML
3 SOLUTION RESPIRATORY (INHALATION) AS NEEDED
Status: CANCELLED | OUTPATIENT
Start: 2025-03-24

## 2025-03-18 RX ORDER — HEPARIN SODIUM,PORCINE/PF 10 UNIT/ML
50 SYRINGE (ML) INTRAVENOUS AS NEEDED
OUTPATIENT
Start: 2025-03-24

## 2025-03-19 ENCOUNTER — SOCIAL WORK (OUTPATIENT)
Dept: HEMATOLOGY/ONCOLOGY | Facility: HOSPITAL | Age: 25
End: 2025-03-19
Payer: MEDICAID

## 2025-03-19 ENCOUNTER — TELEPHONE (OUTPATIENT)
Dept: HEMATOLOGY/ONCOLOGY | Facility: CLINIC | Age: 25
End: 2025-03-19
Payer: MEDICAID

## 2025-03-19 ENCOUNTER — HOSPITAL ENCOUNTER (OUTPATIENT)
Dept: RADIOLOGY | Facility: CLINIC | Age: 25
Discharge: HOME | End: 2025-03-19
Payer: MEDICAID

## 2025-03-19 ENCOUNTER — PROCEDURE VISIT (OUTPATIENT)
Dept: OBSTETRICS AND GYNECOLOGY | Facility: CLINIC | Age: 25
End: 2025-03-19
Payer: MEDICAID

## 2025-03-19 VITALS — BODY MASS INDEX: 26.86 KG/M2 | SYSTOLIC BLOOD PRESSURE: 110 MMHG | WEIGHT: 171.5 LBS | DIASTOLIC BLOOD PRESSURE: 71 MMHG

## 2025-03-19 DIAGNOSIS — Z86.59 HISTORY OF POSTPARTUM DEPRESSION, CURRENTLY PREGNANT (HHS-HCC): ICD-10-CM

## 2025-03-19 DIAGNOSIS — D61.818 PANCYTOPENIA: ICD-10-CM

## 2025-03-19 DIAGNOSIS — Z14.8 GENETIC CARRIER STATUS: ICD-10-CM

## 2025-03-19 DIAGNOSIS — Z3A.32 32 WEEKS GESTATION OF PREGNANCY (HHS-HCC): ICD-10-CM

## 2025-03-19 DIAGNOSIS — Z75.8: ICD-10-CM

## 2025-03-19 DIAGNOSIS — O28.8 NON-REACTIVE NST (NON-STRESS TEST): ICD-10-CM

## 2025-03-19 DIAGNOSIS — O99.891 HISTORY OF POSTPARTUM DEPRESSION, CURRENTLY PREGNANT (HHS-HCC): ICD-10-CM

## 2025-03-19 DIAGNOSIS — B19.10: ICD-10-CM

## 2025-03-19 DIAGNOSIS — D69.6 THROMBOCYTOPENIA AFFECTING PREGNANCY, ANTEPARTUM (MULTI): ICD-10-CM

## 2025-03-19 DIAGNOSIS — O99.119 THROMBOCYTOPENIA AFFECTING PREGNANCY, ANTEPARTUM (MULTI): ICD-10-CM

## 2025-03-19 DIAGNOSIS — Z87.59 HISTORY OF IUFD: ICD-10-CM

## 2025-03-19 DIAGNOSIS — R76.8 HEPATITIS A ANTIBODY POSITIVE: ICD-10-CM

## 2025-03-19 DIAGNOSIS — Z3A.01 LESS THAN 8 WEEKS GESTATION OF PREGNANCY (HHS-HCC): ICD-10-CM

## 2025-03-19 DIAGNOSIS — D68.2: ICD-10-CM

## 2025-03-19 DIAGNOSIS — O98.412: ICD-10-CM

## 2025-03-19 DIAGNOSIS — O34.219 HISTORY OF CESAREAN DELIVERY, CURRENTLY PREGNANT (HHS-HCC): ICD-10-CM

## 2025-03-19 PROCEDURE — 76815 OB US LIMITED FETUS(S): CPT

## 2025-03-19 PROCEDURE — 76819 FETAL BIOPHYS PROFIL W/O NST: CPT

## 2025-03-19 NOTE — PROGRESS NOTES
Transportation Referral     Referral Source: Dr. Booker  Multiple Rides Needed? Yes - Venofer Infustions  First Date Transportation is needed? 3/21/25  Ambulation: Independently  Pick-up Address: [address] [place] [address]   Patient Phone: [phone]  Accompaniment: No  Phone Receives Text Messages? Yes  Transportation Service: [employer] (p: [phone]      Scheduled Ride(s):     Date: 3/21/2025   Appointment: Venofer and abdominal Ultrasound   Drop off Address: [facility]: [address] [place] [address]   Time: 8am   Return Ride: 3pm   Confirmation: [id number]     Date: 3/28/2025   Appointment: Venofer Infusion   Drop off Address: [facility]: [address] [place] [address]   Time: 2:30pm   Return Ride: 6pm   Confirmation: #[id number]    Date: 4/2/2025   Appointment: Venofer Infusion   Drop off Address: [facility]: [address] [place] [address]   Time: 7am   Return Ride: 11am   Confirmation: #[id number]    Pt agreeable to plan. SW will continue to follow as needed.

## 2025-03-19 NOTE — TELEPHONE ENCOUNTER
Per Dr Meléndez patient's spouse advised that IV iron is needed. Appointments scheduled 3/21, 3/28 and 4/2 with SW coordinating transport. Additionally rx for prednisone ordered and patient advised to start today or tomorrow.

## 2025-03-20 DIAGNOSIS — D69.6 THROMBOCYTOPENIA AFFECTING PREGNANCY, ANTEPARTUM (MULTI): Primary | ICD-10-CM

## 2025-03-20 DIAGNOSIS — O99.119 THROMBOCYTOPENIA AFFECTING PREGNANCY, ANTEPARTUM (MULTI): Primary | ICD-10-CM

## 2025-03-20 PROCEDURE — RXMED WILLOW AMBULATORY MEDICATION CHARGE

## 2025-03-20 RX ORDER — PREDNISONE 10 MG/1
30 TABLET ORAL DAILY
Qty: 12 TABLET | Refills: 0 | Status: SHIPPED | OUTPATIENT
Start: 2025-03-20 | End: 2025-03-24

## 2025-03-21 ENCOUNTER — INFUSION (OUTPATIENT)
Dept: HEMATOLOGY/ONCOLOGY | Facility: HOSPITAL | Age: 25
End: 2025-03-21
Payer: MEDICAID

## 2025-03-21 ENCOUNTER — SOCIAL WORK (OUTPATIENT)
Dept: HEMATOLOGY/ONCOLOGY | Facility: HOSPITAL | Age: 25
End: 2025-03-21
Payer: MEDICAID

## 2025-03-21 ENCOUNTER — TELEPHONE (OUTPATIENT)
Dept: HEMATOLOGY/ONCOLOGY | Facility: HOSPITAL | Age: 25
End: 2025-03-21

## 2025-03-21 VITALS
DIASTOLIC BLOOD PRESSURE: 54 MMHG | BODY MASS INDEX: 26.93 KG/M2 | TEMPERATURE: 97.3 F | RESPIRATION RATE: 18 BRPM | SYSTOLIC BLOOD PRESSURE: 101 MMHG | OXYGEN SATURATION: 99 % | HEART RATE: 99 BPM | WEIGHT: 171.96 LBS

## 2025-03-21 DIAGNOSIS — Z3A.32 32 WEEKS GESTATION OF PREGNANCY (HHS-HCC): ICD-10-CM

## 2025-03-21 DIAGNOSIS — D50.9 IRON DEFICIENCY ANEMIA, UNSPECIFIED IRON DEFICIENCY ANEMIA TYPE: ICD-10-CM

## 2025-03-21 DIAGNOSIS — D61.818 PANCYTOPENIA: ICD-10-CM

## 2025-03-21 LAB
ERYTHROCYTE [DISTWIDTH] IN BLOOD BY AUTOMATED COUNT: 20.2 % (ref 11.5–14.5)
HCT VFR BLD AUTO: 27.7 % (ref 36–46)
HGB BLD-MCNC: 8.1 G/DL (ref 12–16)
MCH RBC QN AUTO: 23.6 PG (ref 26–34)
MCHC RBC AUTO-ENTMCNC: 29.2 G/DL (ref 32–36)
MCV RBC AUTO: 81 FL (ref 80–100)
NRBC BLD-RTO: 0 /100 WBCS (ref 0–0)
PLATELET # BLD AUTO: 67 X10*3/UL (ref 150–450)
RBC # BLD AUTO: 3.43 X10*6/UL (ref 4–5.2)
WBC # BLD AUTO: 2.8 X10*3/UL (ref 4.4–11.3)

## 2025-03-21 PROCEDURE — 85027 COMPLETE CBC AUTOMATED: CPT

## 2025-03-21 RX ORDER — ALBUTEROL SULFATE 0.83 MG/ML
3 SOLUTION RESPIRATORY (INHALATION) AS NEEDED
Status: DISCONTINUED | OUTPATIENT
Start: 2025-03-21 | End: 2025-03-21 | Stop reason: HOSPADM

## 2025-03-21 RX ORDER — ALBUTEROL SULFATE 0.83 MG/ML
3 SOLUTION RESPIRATORY (INHALATION) AS NEEDED
OUTPATIENT
Start: 2025-03-25

## 2025-03-21 RX ORDER — FAMOTIDINE 10 MG/ML
20 INJECTION, SOLUTION INTRAVENOUS ONCE AS NEEDED
Status: DISCONTINUED | OUTPATIENT
Start: 2025-03-21 | End: 2025-03-21 | Stop reason: HOSPADM

## 2025-03-21 RX ORDER — DIPHENHYDRAMINE HYDROCHLORIDE 50 MG/ML
50 INJECTION, SOLUTION INTRAMUSCULAR; INTRAVENOUS AS NEEDED
OUTPATIENT
Start: 2025-03-25

## 2025-03-21 RX ORDER — FAMOTIDINE 10 MG/ML
20 INJECTION, SOLUTION INTRAVENOUS ONCE AS NEEDED
OUTPATIENT
Start: 2025-03-25

## 2025-03-21 RX ORDER — DIPHENHYDRAMINE HYDROCHLORIDE 50 MG/ML
50 INJECTION, SOLUTION INTRAMUSCULAR; INTRAVENOUS AS NEEDED
Status: DISCONTINUED | OUTPATIENT
Start: 2025-03-21 | End: 2025-03-21 | Stop reason: HOSPADM

## 2025-03-21 RX ORDER — EPINEPHRINE 0.3 MG/.3ML
0.3 INJECTION SUBCUTANEOUS EVERY 5 MIN PRN
OUTPATIENT
Start: 2025-03-25

## 2025-03-21 RX ORDER — EPINEPHRINE 0.3 MG/.3ML
0.3 INJECTION SUBCUTANEOUS EVERY 5 MIN PRN
Status: DISCONTINUED | OUTPATIENT
Start: 2025-03-21 | End: 2025-03-21 | Stop reason: HOSPADM

## 2025-03-21 ASSESSMENT — PAIN SCALES - GENERAL: PAINLEVEL_OUTOF10: 0-NO PAIN

## 2025-03-21 NOTE — PROGRESS NOTES
This nurse was called at the  , patient refusing to go to the lab. Patient was just then placed in the room  and nurse place IV and draw Lab .  Marcella used for interpretation  , patient with language barrier . Explained to patient that she will get Venofer infusion today and its a 90 minute infusion and will stay 30 minutes for observation after the infusion . Patient wasn't aware that she is getting treatment today . Patient was upset that she has to wait until pharmacy have it ready , and why she was told to come in if its not ready . Explained to the patient the process but still upset about waiting . Patient wants to go home  stated she didn't make arrangement for  and refused the treatment today . Roseann Casal  NP made aware of the issue .

## 2025-03-21 NOTE — PROGRESS NOTES
MFM Follow-up  3/26/2025   2:46 PM     SUBJECTIVE - a video Icelandic  was used for the duration of this visit    HPI: Ramez Stoddard is a 24 y.o.  at 34w0d  here for RPNV. Denies contractions, bleeding, or LOF. Reports normal fetal movement. She does report some tooth pain for about one week on the right lower side. She has taken a medication for the pain once though states that it didn't help so she quit taking it. She does not know what the medication name is. She has not taken any ibuprofen for the pain. She was given information and a phone number for the dentist and plans on calling for an appointment.     Patient was scheduled for iron infusion and liver/spleen ultrasound last week though she left the infusion center prior to her iron infusion and her ultrasound. She has a hepatology appointment scheduled for . She states today that morning appointments work best for her.    She reports her nausea/vomiting has much improved. She is taking the zofran and thinks this is helping. Since her last visit she has gained 0.5kg. She states that she is having some difficulty with constipation and would like a medication to help with this.     OBJECTIVE    Visit Vitals  BP 98/60   Wt 78.5 kg (173 lb)   BMI 27.10 kg/m²   OB Status Pregnant   Smoking Status Never   BSA 1.93 m²      FHT: obtained on NST today    ASSESSMENT & PLAN    Ramez Stoddard is a 24 y.o.  at 34w0d  here for the following concerns we addressed today:    Assessment & Plan  Icelandic  needed  - a Icelandic video  was used throughout the duration of the visit       Hepatitis B complicating pregnancy in second trimester (HHS-HCC)  -continues on Tenofovir 300mg daily   -Last HepB DNA quant of 20K on 3/4 from 169K prior to treatment   -Liver and spleen US ordered and scheduled for completion on 3/27 at 12:15 to assess for cirrhosis/portal HTN. Stressed the importance of completion to the  patient as results are need for hepatology input and can guide management plan. Patient expressed understanding   -Follow up hepatology consult scheduled for .  -Plan for ID referral to be completed postpartum          34 weeks gestation of pregnancy ([facility])  -Plans to breast feed   -Patient does not desire birth control after delivery   -Planning TOLAC at 39wks given hx of IUFD  -GBS to be collected next week  -Communication sent to L&D and OB division leads for awareness purposes on this complex case as delivery is nearing.        History of IUFD  -Undergoing weekly NSTs until delivery   -39 week TOLAC planned. Will schedule at next visit        Maternal iron deficiency anemia affecting pregnancy in third trimester, antepartum (HHS-HCC)  -Scheduled for IV iron infusions. Continue   -IV iron infusion today though missed appointment secondary to transportation issues. IV iron infusion rescheduled to Friday.        Thrombocytopenia affecting pregnancy, antepartum (Multi)  -Plt last on 3/21 of 67 (divya 40 on )   -Etiology attributed to liver disease and splenomegaly per hematology. Cannot rule out ITP, though labs not consistent with ITP. Therefore, limited role for steroids to increase platelets.   -To increase platelets closer to delivery, plan would be for platelet transfusion over steroid administration as steroids not thought to be effective per heme   -Discussed today with platelet count of <70 neuraxial anesthesia is NOT an option. Discussed alterative options for pain control including IV pain meds and nitrous oxide. Patient does not plan to use medications for pain control in labor.        History of   -Hx of  with IUFD in 2024   -Hx of CD in [place] with unknown uterine scar, previously counseled and desires TOLAC       Splenomegaly  -Ultrasound of spleen scheduled for 3/27. Follow up   -Concern for splenic sequestration of platelets given splenomegaly, contributing to  "thrombocytopenia       Constipation in pregnancy in third trimester ([facility])    Orders:    polyethylene glycol (Glycolax, Miralax) 17 gram/dose powder; Mix 17 g of powder and drink once daily for 3 days.    Pancytopenia  Today we discussed my concerns with delivery planning and her health with not only her pancytopenia but also her chronic Hepatitis B. I discussed with her that two different levels in her blood which are low are important for delivery, the platelets which help her blood clot and the hemoglobin which carries the oxygen in the blood. We discussed delivery is associated with blood loss, dropping the Hgb level even lower, and requires blood clotting which is dependent upon platelets. We discussed there are different reasons for the platelets to be low and I am specifically concerned about her liver and spleen function. I informed her the ultrasound of the liver and spleen which is scheduled tomorrow will important to assess liver and spleen which will in turn guide our management of her low platelets and help to ensure she has a safe delivery. Additionally, I informed her that it will be important for her to get her Hgb level increased to decreased morbidity during delivery. I informed her the hematologists, who are the blood doctors, think the best way to get her Hgb level up will be with IV iron infusions. I reiterated that this is time sensitive because her delivery will be happening in 5 weeks at the latest. Throughout the above outlined discussion with the , the patient would respond with \"yes.\" I paused frequently during our discussion to ask if she had questions. She asked if the ultrasound of her liver would be safe of the baby. After I informed her that ultrasound in pregnancy is the safest imaging modality and does not give any radiation or harmful exposure, she expressed reassurance. Additionally, she asked if we could schedule her delivery, stating she does not want what happened " to her last baby to happen in this pregnancy. I reassured her that we would scheduled a TOLAC at 39wks. From our conversations, it is my impression that she understands her health care is complicated but important and has a general understanding of the severity within the possibilities of her outcomes. At every visit, she has expressed her concern and desire for a different outcome in this pregnancy compared to the last.            RTC in 2 weeks    Patient seen and evaluated with Dr. Noble Mcgarry MD

## 2025-03-21 NOTE — TELEPHONE ENCOUNTER
RN was informed by infusion that patient left infusion without getting treatment today and did not want to get ultrasound done today either. Social work had spoken to patient's family member Wilmar this morning about all of the appointments as well. The patient mentioned that she did not have a  and that is why she needed to leave.  aware and message sent to MD. RN called primary number and spoke to Wilmar. He said he believed the patient should be able to come in on Tuesday or Wednesday next week and believes she should have childcare. RN sent message to infusion team to see if they can get her in on one of those days.

## 2025-03-21 NOTE — PROGRESS NOTES
Called to patient room 31 for assistance. Patient upset that infusion is taking a long time. Tried to set up Marcella to bridge language barrier but patient left infusion suite AMA and prior to infusion with peripheral IV still in place. This nurse went to reception area to remove IV. Patient had already removed dressing and was in process of taking IV catheter out. Patient stopped, gauze placed on needle site and wrapped with cobane. Message sent to social work to contact patient for ride and clinic nurse to discuss next steps for patient infusion.  Radha Leonard RN

## 2025-03-21 NOTE — PROGRESS NOTES
Social Work Note    Referral Source: Dr. Booker  Meeting Location: Phone  Person(s) Present: Pt's brotherWilmar  Identified Needs: Transportation/care coordination  Impression and Plan: LISW contacted pt's brotherWilmar when pt had not arrived for their venofer infusion. Wilmar states pt was unaware of this infusion and she would not be ready until 11am. (Appointment and time were confirmed multiple times with pt's brother earlier in the week). LISW messaged infusion RN to determine if they can accommodate. Additionally, Wilmar states he believed this appointment was for lab work. LISW reviewed the appointment is for iron infusion and pt will need to come in for infusion once a week for three weeks. Wilmar expressed understanding. LISW reviewed Ultrasound appt at 1:45pm as well. Wilmar states pt will attend this appt but will need new transportation. LISW agreeable to arrange rides via roundtrip once infusion confirms availability. Wilmar expresses understanding of plan.    Interventions Provided: Care Coordination and Education  Estimated Time Spent: 25 min       Addendum:  Infusion RN confirmed pt can arrive at 11am. LISW contacted Wilmar to review plan and Wilmar agrees to 10:30am . Wilmar states to use his phone number for roundtrip ride and is agreeable to will call ride. Wilmar denies any further psychosocial or support service needs at this time.       Transportation Referral     Referral Source: Dr. Booker  Multiple Rides Needed? No  First Date Transportation is needed? 3/21/25  Ambulation: Independently  Pick-up Address: [address] [place], [place] [address]   Patient Phone: [phone]   Accompaniment: No  Phone Receives Text Messages? Yes  Transportation Service: Roundtrip ( p:[phone]) , Reason: same day transport      Scheduled Ride(s):     Date: 3/21/25   Appointment: Infusion   Drop [facility]: [id number] [place] [address]   Time: 10:30am   Return Ride: Will call    Confirmation: email           SW will continue to follow as needed.

## 2025-03-24 ENCOUNTER — APPOINTMENT (OUTPATIENT)
Dept: HEMATOLOGY/ONCOLOGY | Facility: HOSPITAL | Age: 25
End: 2025-03-24
Payer: MEDICAID

## 2025-03-24 ENCOUNTER — SOCIAL WORK (OUTPATIENT)
Dept: HEMATOLOGY/ONCOLOGY | Facility: HOSPITAL | Age: 25
End: 2025-03-24
Payer: MEDICAID

## 2025-03-24 ENCOUNTER — TELEPHONE (OUTPATIENT)
Dept: OBSTETRICS AND GYNECOLOGY | Facility: HOSPITAL | Age: 25
End: 2025-03-24
Payer: MEDICAID

## 2025-03-24 NOTE — TELEPHONE ENCOUNTER
Called patient this AM, spoke with brother (DAMIAN) reviewed liver/spleen ultrasound rescheduled for 3/27 at 1230.   This NP also arranged transportation via patient's insurer for this visit (see details below), also confirmed ride scheduled for 3/26 MFM visit.   Will text appt details to ride number on file.     3/26 NST & MFM Visit (Trip # [id number]), confirmed 3/24   time ~ 12:00p; Return Ride scheduled 2:45p    3/27 Liver/Spleen Ultrasound (Trip # [id number])   time ~ 11:15p; Return Ride scheduled 2:00p  Instructed to drop off at ([facility]).     Will continue to follow care and be available for coordination as needed.     Glenis Craig CNP  Complex/High Risk OB   [phone]

## 2025-03-24 NOTE — PROGRESS NOTES
Social Work Note    Referral Source: ISAIAS De Dios RN   Meeting Location: Phone  Person(s) Present: Family Member, Wilmar  Identified Needs: Care coordination  Impression and Plan: LISW made aware pt left infusion without treatment on Friday stating concerns with childcare. Pt rescheduled for Wednesday, March 26 after [facility] appointment. LISW contact Wilmar to discuss further. Per Wilmar, pt had childcare Friday. LISW inquired if pt may not want to receive treatment. Wilmar denies stating pt will stay for treatment this week. LISW informed Wilmar of schedule for Wednesday, Thursday and Friday and informed him of the time frames. Wilmar reaffirms pt will remain for length of appointment. Wilmar agreeable to staff contacting him if pt is requesting to leave prior to treatment. LISW inquired if anyone can attend appointment with patient. Wilmar states their supports are unavailable during the work days and someone could attend on the weekends. Wilmar denies any further concerns at this time. .LISW updated team.   Interventions Provided: Care Coordination, Education, Referral to Community Resource, and Supportive Counseling  Estimated Time Spent: 40 min    Transportation Referral     Referral Source: Dr. Jhony MD   Multiple Rides Needed? Yes - care coordination  First Date Transportation is needed? 3/26/25  Ambulation: Independently  Pick-up Address: [address] [place] [address]   Patient Phone: [phone]  Accompaniment: No  Phone Receives Text Messages? Yes  Transportation Service: Roundtrip ( p:[phone]) , Reason: care coordination      Scheduled Ride(s):     Date: 3/26/25   Appointment: Infusion   Drop off Address:  Home address   Time: return ride--insurance will provide ride to first apt.    Return Ride: will call   Confirmation: email    Date: 3/28/2025   Appointment: 3:30   Drop off Address: [facility]: [address] [place] [address]   Time:  3pm  Return Ride:  will call  Confirmation: email            SW will continue to follow as needed.

## 2025-03-25 PROBLEM — Z3A.34 34 WEEKS GESTATION OF PREGNANCY (HHS-HCC): Status: ACTIVE | Noted: 2025-02-15

## 2025-03-25 PROBLEM — O99.013 MATERNAL IRON DEFICIENCY ANEMIA AFFECTING PREGNANCY IN THIRD TRIMESTER, ANTEPARTUM: Status: ACTIVE | Noted: 2025-03-18

## 2025-03-25 NOTE — ASSESSMENT & PLAN NOTE
-Scheduled for IV iron infusions. Continue   -IV iron infusion today though missed appointment secondary to transportation issues. IV iron infusion rescheduled to Friday.

## 2025-03-25 NOTE — ASSESSMENT & PLAN NOTE
-Plans to breast feed   -Patient does not desire birth control after delivery   -Planning TOLAC at 39wks given hx of IUFD  -GBS to be collected next week  -Communication sent to L&D and OB division leads for awareness purposes on this complex case as delivery is nearing.

## 2025-03-25 NOTE — ASSESSMENT & PLAN NOTE
-Hx of  with IUFD in 2024   -Hx of CD in [place] with unknown uterine scar, previously counseled and desires TOLAC

## 2025-03-25 NOTE — ASSESSMENT & PLAN NOTE
-Ultrasound of spleen scheduled for 3/27. Follow up   -Concern for splenic sequestration of platelets given splenomegaly, contributing to thrombocytopenia

## 2025-03-25 NOTE — ASSESSMENT & PLAN NOTE
-Plt last on 3/21 of 67 (divya 40 on 2/14)   -Etiology attributed to liver disease and splenomegaly per hematology. Cannot rule out ITP, though labs not consistent with ITP. Therefore, limited role for steroids to increase platelets.   -To increase platelets closer to delivery, plan would be for platelet transfusion over steroid administration as steroids not thought to be effective per heme   -Discussed today with platelet count of <70 neuraxial anesthesia is NOT an option. Discussed alterative options for pain control including IV pain meds and nitrous oxide. Patient does not plan to use medications for pain control in labor.

## 2025-03-25 NOTE — ASSESSMENT & PLAN NOTE
-continues on Tenofovir 300mg daily   -Last HepB DNA quant of 20K on 3/4 from 169K prior to treatment   -Liver and spleen US ordered and scheduled for completion on 3/27 at 12:15 to assess for cirrhosis/portal HTN. Stressed the importance of completion to the patient as results are need for hepatology input and can guide management plan. Patient expressed understanding   -Follow up hepatology consult scheduled for 4/2.  -Plan for ID referral to be completed postpartum

## 2025-03-25 NOTE — PROGRESS NOTES
PCP: No Assigned PCP Generic Provider, MD   Referred:     Ramez Stoddard is a 24 y.o. female with PMH of  female at 35 weeks without prenatal care, chronic hepatitis B, thrombocytopenia, leukopenia, and stillbirth in , presenting with chronic hepatitis B being treated with tenofovir.    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 She has had limited blood work in between 2 pregnancies in last 2yrs.     History Of Present Illness:    She was unaware that she had hepatitis B prior to her emigration the [place]. She denied n/v, abdominal pain, melena, and hematochezia.    12-point ROS was reviewed and is otherwise negative.    Pertinent Procedures:  None.    Pertinent GI Imagin2024 US gallbladder:  IMPRESSION:   Normal sonographic appearance of the right upper quadrant.   Splenomegaly to 16.9 cm.     Family History:  Denied liver disease/cancer.    Past Medical History:  She has a past medical history of Genetic carrier, Hepatitis A antibody positive, Hepatitis B, History of IUFD, History of placental abruption, Pancytopenia, Pelvic kidney, and Splenomegaly.    Home Medications:  Current Outpatient Medications   Medication Instructions    FeroSuL 325 mg, oral, Daily with breakfast    ondansetron ODT (ZOFRAN-ODT) 8 mg, oral, Every 8 hours PRN    polyethylene glycol (GLYCOLAX, MIRALAX) 17 g, oral, Daily    prenatal vit,calc76-iron-folic (Prenatabs Rx) 29 mg iron- 1 mg tablet 1 tablet, oral, Daily    prenatal vitamin, iron-folic, 27 mg iron-800 mcg folic acid tablet 1 tablet, oral, Daily    tenofovir disoproxil fumarate (VIREAD) 300 mg, oral, Daily        Surgical History:  She has a past surgical history that includes  section, low transverse.     Social History:  She reports that she has never smoked. She has never used smokeless tobacco. She reports that she does not drink alcohol and does not use drugs.    Family History:  No family  history on file.     Allergies:  Patient has no known allergies.    OBJECTIVES:  Vital Signs:  Blood pressure 107/70, pulse 101, temperature 36.6 °C (97.9 °F), SpO2 97%.    Physical Exam:   General: Patient is in NAD.  Neuro: A and O x 3, CN 1-12 grossly intact.  HEENT: PERRLA, sclera anicteric.  CV: RRR, no m/r/g.  Pulm: CTA BL, no crackles, or wheezes.  Abd: Gravid abdomen, soft, NTTP, no rebound or guarding.    Psych: Appropriate mood and behavior.    Labs:  3/21/2025 plt 67  3/12/2025 HAV IgM non-reactive  3/4/2025 KELLI 1:320, HBV DNA 20,100, HBeAb negative, HBeAg positive, HBcAb/HBsAb nonreactive, HAV total Ab positive  2/19/2025 alb 3.3, alk phos 56, AST 38, ALT 23, T bili 1.3    MELD 3.0: 9 at 3/4/2025  9:50 AM  MELD-Na: 7 at 3/4/2025  9:50 AM  Calculated from:  Serum Creatinine: 0.39 mg/dL (Using min of 1 mg/dL) at 3/4/2025  9:50 AM  Serum Sodium: 137 mmol/L at 3/4/2025  9:50 AM  Total Bilirubin: 0.9 mg/dL (Using min of 1 mg/dL) at 3/4/2025  9:50 AM  Serum Albumin: 3.3 g/dL at 3/4/2025  9:50 AM  INR(ratio): 1.1 at 3/4/2025  9:50 AM  Age at listing (hypothetical): 24 years  Sex: Female at 3/4/2025  9:50 AM    Assessment/Plan     To monitor the response to tenofovir we measure:  - HBV DNA now and every three to six months until undetectable and every six months thereafter.  - Aminotransferases now and every three months until ALT is normal or near normal (<2 x ULN) and then every six months in patients with an undetectable HBV DNA or normalized ALT.  - HBeAg and anti-HBe every 12 months in patients who are HBeAg positive to determine if seroconversion has occurred. If HBeAg seroconversion has occurred, we repeat the HBeAg and anti-HBe in six months to confirm the result.   - Creatinine and phosphate should be monitored now and every three to six months.   - For those with decompensated cirrhosis, the creatinine should be monitored more frequently (eg, every one to three months).     RTC in 3 months  post-partum.    Judy Downey MD

## 2025-03-26 ENCOUNTER — PHARMACY VISIT (OUTPATIENT)
Dept: PHARMACY | Facility: CLINIC | Age: 25
End: 2025-03-26
Payer: MEDICAID

## 2025-03-26 ENCOUNTER — TELEPHONE (OUTPATIENT)
Dept: OBSTETRICS AND GYNECOLOGY | Facility: HOSPITAL | Age: 25
End: 2025-03-26
Payer: MEDICAID

## 2025-03-26 ENCOUNTER — ROUTINE PRENATAL (OUTPATIENT)
Dept: MATERNAL FETAL MEDICINE | Facility: CLINIC | Age: 25
End: 2025-03-26
Payer: MEDICAID

## 2025-03-26 VITALS — DIASTOLIC BLOOD PRESSURE: 60 MMHG | SYSTOLIC BLOOD PRESSURE: 98 MMHG | BODY MASS INDEX: 27.1 KG/M2 | WEIGHT: 173 LBS

## 2025-03-26 DIAGNOSIS — O99.613 CONSTIPATION IN PREGNANCY IN THIRD TRIMESTER (HHS-HCC): ICD-10-CM

## 2025-03-26 DIAGNOSIS — D50.9 MATERNAL IRON DEFICIENCY ANEMIA AFFECTING PREGNANCY IN THIRD TRIMESTER, ANTEPARTUM: ICD-10-CM

## 2025-03-26 DIAGNOSIS — B19.10: Primary | ICD-10-CM

## 2025-03-26 DIAGNOSIS — O99.013 MATERNAL IRON DEFICIENCY ANEMIA AFFECTING PREGNANCY IN THIRD TRIMESTER, ANTEPARTUM: ICD-10-CM

## 2025-03-26 DIAGNOSIS — K59.00 CONSTIPATION IN PREGNANCY IN THIRD TRIMESTER (HHS-HCC): ICD-10-CM

## 2025-03-26 DIAGNOSIS — Z98.891 HISTORY OF VBAC: ICD-10-CM

## 2025-03-26 DIAGNOSIS — Z75.8: ICD-10-CM

## 2025-03-26 DIAGNOSIS — O98.412: Primary | ICD-10-CM

## 2025-03-26 DIAGNOSIS — R16.1 SPLENOMEGALY: ICD-10-CM

## 2025-03-26 DIAGNOSIS — D69.6 THROMBOCYTOPENIA AFFECTING PREGNANCY, ANTEPARTUM (MULTI): ICD-10-CM

## 2025-03-26 DIAGNOSIS — O99.119 THROMBOCYTOPENIA AFFECTING PREGNANCY, ANTEPARTUM (MULTI): ICD-10-CM

## 2025-03-26 DIAGNOSIS — Z3A.34 34 WEEKS GESTATION OF PREGNANCY (HHS-HCC): ICD-10-CM

## 2025-03-26 DIAGNOSIS — D61.818 PANCYTOPENIA: ICD-10-CM

## 2025-03-26 DIAGNOSIS — Z87.59 HISTORY OF IUFD: ICD-10-CM

## 2025-03-26 PROCEDURE — RXMED WILLOW AMBULATORY MEDICATION CHARGE

## 2025-03-26 PROCEDURE — 99215 OFFICE O/P EST HI 40 MIN: CPT | Performed by: STUDENT IN AN ORGANIZED HEALTH CARE EDUCATION/TRAINING PROGRAM

## 2025-03-26 PROCEDURE — 99215 OFFICE O/P EST HI 40 MIN: CPT | Mod: GC | Performed by: STUDENT IN AN ORGANIZED HEALTH CARE EDUCATION/TRAINING PROGRAM

## 2025-03-26 RX ORDER — POLYETHYLENE GLYCOL 3350 17 G/17G
17 POWDER, FOR SOLUTION ORAL DAILY
Qty: 119 G | Refills: 0 | Status: SHIPPED | OUTPATIENT
Start: 2025-03-26 | End: 2025-04-02

## 2025-03-26 NOTE — TELEPHONE ENCOUNTER
Patient's Brother Wilmar called inquiring about transportation for today's appointment.  Reports ride arranged through insurance has not shown yet for 1pm appt.  This NP scheduled ride through round trip and will arrange ride to [facility] for infusion center.   Nurse Sammi aware to message this NP after OB appt complete.   Updated Heme  as well, will notify her when patient on the way.   Wilmar reassured this NP  has been arranged for this afternoon.  Will continue to follow care and be available for coordination.     Glenis Craig CNP  Complex/High Risk OB   [phone]

## 2025-03-26 NOTE — ASSESSMENT & PLAN NOTE
"Today we discussed my concerns with delivery planning and her health with not only her pancytopenia but also her chronic Hepatitis B. I discussed with her that two different levels in her blood which are low are important for delivery, the platelets which help her blood clot and the hemoglobin which carries the oxygen in the blood. We discussed delivery is associated with blood loss, dropping the Hgb level even lower, and requires blood clotting which is dependent upon platelets. We discussed there are different reasons for the platelets to be low and I am specifically concerned about her liver and spleen function. I informed her the ultrasound of the liver and spleen which is scheduled tomorrow will important to assess liver and spleen which will in turn guide our management of her low platelets and help to ensure she has a safe delivery. Additionally, I informed her that it will be important for her to get her Hgb level increased to decreased morbidity during delivery. I informed her the hematologists, who are the blood doctors, think the best way to get her Hgb level up will be with IV iron infusions. I reiterated that this is time sensitive because her delivery will be happening in 5 weeks at the latest. Throughout the above outlined discussion with the , the patient would respond with \"yes.\" I paused frequently during our discussion to ask if she had questions. She asked if the ultrasound of her liver would be safe of the baby. After I informed her that ultrasound in pregnancy is the safest imaging modality and does not give any radiation or harmful exposure, she expressed reassurance. Additionally, she asked if we could schedule her delivery, stating she does not want what happened to her last baby to happen in this pregnancy. I reassured her that we would scheduled a TOLAC at 39wks. From our conversations, it is my impression that she understands her health care is complicated but important and has " a general understanding of the severity within the possibilities of her outcomes. At every visit, she has expressed her concern and desire for a different outcome in this pregnancy compared to the last.

## 2025-03-27 ENCOUNTER — DOCUMENTATION (OUTPATIENT)
Dept: OBSTETRICS AND GYNECOLOGY | Facility: HOSPITAL | Age: 25
End: 2025-03-27
Payer: MEDICAID

## 2025-03-27 ENCOUNTER — HOSPITAL ENCOUNTER (OUTPATIENT)
Dept: RADIOLOGY | Facility: HOSPITAL | Age: 25
Discharge: HOME | End: 2025-03-27
Payer: MEDICAID

## 2025-03-27 ENCOUNTER — SOCIAL WORK (OUTPATIENT)
Dept: HEMATOLOGY/ONCOLOGY | Facility: HOSPITAL | Age: 25
End: 2025-03-27
Payer: MEDICAID

## 2025-03-27 DIAGNOSIS — D61.818 PANCYTOPENIA: ICD-10-CM

## 2025-03-27 DIAGNOSIS — D69.6 THROMBOCYTOPENIA (CMS-HCC): ICD-10-CM

## 2025-03-27 DIAGNOSIS — B19.10 HEPATITIS B AFFECTING PREGNANCY (HHS-HCC): ICD-10-CM

## 2025-03-27 DIAGNOSIS — O98.419 HEPATITIS B AFFECTING PREGNANCY (HHS-HCC): ICD-10-CM

## 2025-03-27 PROCEDURE — 76705 ECHO EXAM OF ABDOMEN: CPT

## 2025-03-27 NOTE — PROGRESS NOTES
Transportation Referral     Referral Source: PIPER Ramirez  Multiple Rides Needed? No  First Date Transportation is needed? 3/28/25  Ambulation: Independently  Pick-up Address: [address] [place] [address]   Patient Phone: [phone]   Accompaniment: No  Phone Receives Text Messages? Yes  Transportation Service: Roundtrip ( p:[phone]) , Reason: next day appointment      Scheduled Ride(s):     Date: 3/28/25   Appointment: Infusion   Drop off Address: [facility] [place] SCC: [address] [place] [address]   Time: 8am   Return Ride: will call   Confirmation: email         Pt and family agreeable to plan. SW will continue to follow as needed. '

## 2025-03-27 NOTE — PROGRESS NOTES
"This NP assisted with transportation to today's liver/spleen ultrasound and walked with patient to and from appt.   Also reviewed importance of tomorrow's IV iron infusion appt with patient and print out given with all upcoming appts information, patient confirmed understanding via \"teach back\" method.   Also requested transportation assistance from Heme  for tomorrow, awaiting response.   Will continue to follow care and be available for coordination as needed.     Glenis Craig [facility]  Complex/High Risk OB   [phone]  "

## 2025-03-28 ENCOUNTER — INFUSION (OUTPATIENT)
Dept: HEMATOLOGY/ONCOLOGY | Facility: HOSPITAL | Age: 25
End: 2025-03-28
Payer: MEDICAID

## 2025-03-28 ENCOUNTER — APPOINTMENT (OUTPATIENT)
Dept: HEMATOLOGY/ONCOLOGY | Facility: HOSPITAL | Age: 25
End: 2025-03-28
Payer: MEDICAID

## 2025-03-28 VITALS
HEART RATE: 102 BPM | SYSTOLIC BLOOD PRESSURE: 96 MMHG | RESPIRATION RATE: 18 BRPM | TEMPERATURE: 98.6 F | BODY MASS INDEX: 27.31 KG/M2 | WEIGHT: 174.38 LBS | OXYGEN SATURATION: 100 % | DIASTOLIC BLOOD PRESSURE: 48 MMHG

## 2025-03-28 DIAGNOSIS — D61.818 PANCYTOPENIA: ICD-10-CM

## 2025-03-28 DIAGNOSIS — D50.9 MATERNAL IRON DEFICIENCY ANEMIA AFFECTING PREGNANCY IN THIRD TRIMESTER, ANTEPARTUM: ICD-10-CM

## 2025-03-28 DIAGNOSIS — O99.013 MATERNAL IRON DEFICIENCY ANEMIA AFFECTING PREGNANCY IN THIRD TRIMESTER, ANTEPARTUM: ICD-10-CM

## 2025-03-28 DIAGNOSIS — Z3A.34 34 WEEKS GESTATION OF PREGNANCY (HHS-HCC): ICD-10-CM

## 2025-03-28 PROCEDURE — 96365 THER/PROPH/DIAG IV INF INIT: CPT | Mod: INF

## 2025-03-28 PROCEDURE — 2500000004 HC RX 250 GENERAL PHARMACY W/ HCPCS (ALT 636 FOR OP/ED): Mod: SE | Performed by: STUDENT IN AN ORGANIZED HEALTH CARE EDUCATION/TRAINING PROGRAM

## 2025-03-28 RX ORDER — EPINEPHRINE 0.3 MG/.3ML
0.3 INJECTION SUBCUTANEOUS EVERY 5 MIN PRN
OUTPATIENT
Start: 2025-03-29

## 2025-03-28 RX ORDER — ALBUTEROL SULFATE 0.83 MG/ML
3 SOLUTION RESPIRATORY (INHALATION) AS NEEDED
Status: DISCONTINUED | OUTPATIENT
Start: 2025-03-28 | End: 2025-03-28 | Stop reason: HOSPADM

## 2025-03-28 RX ORDER — DIPHENHYDRAMINE HYDROCHLORIDE 50 MG/ML
50 INJECTION, SOLUTION INTRAMUSCULAR; INTRAVENOUS AS NEEDED
Status: DISCONTINUED | OUTPATIENT
Start: 2025-03-28 | End: 2025-03-28 | Stop reason: HOSPADM

## 2025-03-28 RX ORDER — ALBUTEROL SULFATE 0.83 MG/ML
3 SOLUTION RESPIRATORY (INHALATION) AS NEEDED
OUTPATIENT
Start: 2025-03-29

## 2025-03-28 RX ORDER — EPINEPHRINE 0.3 MG/.3ML
0.3 INJECTION SUBCUTANEOUS EVERY 5 MIN PRN
Status: DISCONTINUED | OUTPATIENT
Start: 2025-03-28 | End: 2025-03-28 | Stop reason: HOSPADM

## 2025-03-28 RX ORDER — FAMOTIDINE 10 MG/ML
20 INJECTION, SOLUTION INTRAVENOUS ONCE AS NEEDED
OUTPATIENT
Start: 2025-03-29

## 2025-03-28 RX ORDER — DIPHENHYDRAMINE HYDROCHLORIDE 50 MG/ML
50 INJECTION, SOLUTION INTRAMUSCULAR; INTRAVENOUS AS NEEDED
OUTPATIENT
Start: 2025-03-29

## 2025-03-28 RX ORDER — FAMOTIDINE 10 MG/ML
20 INJECTION, SOLUTION INTRAVENOUS ONCE AS NEEDED
Status: DISCONTINUED | OUTPATIENT
Start: 2025-03-28 | End: 2025-03-28 | Stop reason: HOSPADM

## 2025-03-28 RX ADMIN — IRON SUCROSE 300 MG: 20 INJECTION, SOLUTION INTRAVENOUS at 09:30

## 2025-03-28 NOTE — PROGRESS NOTES
[facility] Infusion Nursing Note  03/28/25    Ramez Stoddard is a 24 y.o. year old female patient presenting to outpatient infusion for Venofer.    Administrations This Visit       iron sucrose (Venofer) 300 mg in sodium chloride 0.9% 282 mL IV       Admin Date  03/28/2025 Action  New Bag Dose  300 mg Rate  188 mL/hr Route  intravenous Documented By  Maurice Miller RN                  Hypersensitivity reaction noted: No.   Marcella  used. Patient tolerated treatment well. PIV removed. Pt refused to stay for 30 min observation, states she feels fine and wants to go. Pt discharged in stable condition and ambulated off unit independently.    Follow-up Plan: 4/2    MAURICE MILLER RN

## 2025-03-31 ENCOUNTER — TELEPHONE (OUTPATIENT)
Dept: OBSTETRICS AND GYNECOLOGY | Facility: HOSPITAL | Age: 25
End: 2025-03-31
Payer: MEDICAID

## 2025-04-01 ENCOUNTER — TELEPHONE (OUTPATIENT)
Dept: OBSTETRICS AND GYNECOLOGY | Facility: HOSPITAL | Age: 25
End: 2025-04-01
Payer: MEDICAID

## 2025-04-01 ENCOUNTER — SOCIAL WORK (OUTPATIENT)
Dept: HEMATOLOGY/ONCOLOGY | Facility: HOSPITAL | Age: 25
End: 2025-04-01
Payer: MEDICAID

## 2025-04-01 ENCOUNTER — APPOINTMENT (OUTPATIENT)
Dept: HEMATOLOGY/ONCOLOGY | Facility: HOSPITAL | Age: 25
End: 2025-04-01
Payer: MEDICAID

## 2025-04-01 NOTE — PATIENT INSTRUCTIONS
If you have any questions or need assistance, please don't hesitate to contact us.     Our offices are located at [facility].  Please use the numbers below when calling.    Post:         Office [phone] Fax: [phone]  Hepatology Nurse Coordinator:         Alexandria PIZARRO [phone]     SCHEDULING  You will get a notification through [place] [facility] or a phone call to help you schedule all your tests. If you prefer, feel free to call the main scheduling number to set up any tests you need.    Main Scheduling Number: [phone]  (See below for department name when scheduling)    Here is a summary of the tests we have ordered and when they are due:     [x] LABS (Can be done at any /[facility] Location)  Due : Today       [x] MEDICATION(s)   SPECIALTY MEDICATION: Your medication(s) has been sent to  Specialty Pharmacy ([phone]). Our pharmacists and technicians are trained to review medical information and handle specialty medications, helping to remove any barriers with medication affordability and filling your prescription. Our pharmacy will work with your insurance company for approval.  Once approved, you will receive a phone call from our pharmacy to arrange for no-cost home delivery. The specialty pharmacy will provide patient education, medication management, and prescription refill reminders and monitoring.

## 2025-04-01 NOTE — PROGRESS NOTES
Transportation Referral     Referral Source: Dr. Booker  Multiple Rides Needed? No  First Date Transportation is needed? 4/2/25  Ambulation: Independently  Pick-up Address: [address] [place] [address]   Patient Phone: [phone]  Accompaniment: No  Phone Receives Text Messages? Yes  Transportation Service: Roundtrip ( p:[phone]) , Reason: care coordination      Scheduled Ride(s):     Date: 4/2/25   Appointment: Transfusion   [place] [facility] [place] [id number] [place] [address]   Time: 7:30am   Return Ride: Will Call   Confirmation: email        LISW spoke with pt's support person, Wilmar, who confirmed appointment and transportation plan. SW will continue to follow as needed.

## 2025-04-01 NOTE — TELEPHONE ENCOUNTER
[facility] Staff called requesting return ride on patients behalf.   This NP scheduled return ride for today, 4 pm  time.   Will continue to be available for coordination of care as needed.     Glenis Craig CNP  Complex/High Risk OB   [phone]

## 2025-04-02 ENCOUNTER — APPOINTMENT (OUTPATIENT)
Dept: OBSTETRICS AND GYNECOLOGY | Facility: HOSPITAL | Age: 25
End: 2025-04-02
Payer: MEDICAID

## 2025-04-02 ENCOUNTER — APPOINTMENT (OUTPATIENT)
Dept: OBSTETRICS AND GYNECOLOGY | Facility: CLINIC | Age: 25
End: 2025-04-02
Payer: MEDICAID

## 2025-04-02 ENCOUNTER — INFUSION (OUTPATIENT)
Dept: HEMATOLOGY/ONCOLOGY | Facility: HOSPITAL | Age: 25
End: 2025-04-02
Payer: MEDICAID

## 2025-04-02 ENCOUNTER — SPECIALTY PHARMACY (OUTPATIENT)
Dept: PHARMACY | Facility: CLINIC | Age: 25
End: 2025-04-02

## 2025-04-02 ENCOUNTER — DOCUMENTATION (OUTPATIENT)
Dept: OBSTETRICS AND GYNECOLOGY | Facility: HOSPITAL | Age: 25
End: 2025-04-02

## 2025-04-02 ENCOUNTER — OFFICE VISIT (OUTPATIENT)
Dept: GASTROENTEROLOGY | Facility: HOSPITAL | Age: 25
End: 2025-04-02
Payer: MEDICAID

## 2025-04-02 VITALS
SYSTOLIC BLOOD PRESSURE: 107 MMHG | WEIGHT: 173.7 LBS | HEART RATE: 89 BPM | DIASTOLIC BLOOD PRESSURE: 61 MMHG | BODY MASS INDEX: 27.21 KG/M2 | RESPIRATION RATE: 18 BRPM | OXYGEN SATURATION: 100 % | TEMPERATURE: 97.7 F

## 2025-04-02 VITALS
DIASTOLIC BLOOD PRESSURE: 70 MMHG | SYSTOLIC BLOOD PRESSURE: 107 MMHG | TEMPERATURE: 97.9 F | OXYGEN SATURATION: 97 % | HEART RATE: 101 BPM

## 2025-04-02 DIAGNOSIS — D50.9 MATERNAL IRON DEFICIENCY ANEMIA AFFECTING PREGNANCY IN THIRD TRIMESTER, ANTEPARTUM: ICD-10-CM

## 2025-04-02 DIAGNOSIS — Z3A.34 34 WEEKS GESTATION OF PREGNANCY (HHS-HCC): ICD-10-CM

## 2025-04-02 DIAGNOSIS — R76.8 HEPATITIS A ANTIBODY POSITIVE: ICD-10-CM

## 2025-04-02 DIAGNOSIS — O98.413: ICD-10-CM

## 2025-04-02 DIAGNOSIS — D61.818 PANCYTOPENIA: ICD-10-CM

## 2025-04-02 DIAGNOSIS — O99.013 MATERNAL IRON DEFICIENCY ANEMIA AFFECTING PREGNANCY IN THIRD TRIMESTER, ANTEPARTUM: ICD-10-CM

## 2025-04-02 DIAGNOSIS — B19.10: ICD-10-CM

## 2025-04-02 PROCEDURE — 2500000004 HC RX 250 GENERAL PHARMACY W/ HCPCS (ALT 636 FOR OP/ED): Mod: SE | Performed by: STUDENT IN AN ORGANIZED HEALTH CARE EDUCATION/TRAINING PROGRAM

## 2025-04-02 PROCEDURE — 96365 THER/PROPH/DIAG IV INF INIT: CPT | Mod: INF

## 2025-04-02 PROCEDURE — 99204 OFFICE O/P NEW MOD 45 MIN: CPT | Performed by: INTERNAL MEDICINE

## 2025-04-02 PROCEDURE — 99214 OFFICE O/P EST MOD 30 MIN: CPT | Performed by: INTERNAL MEDICINE

## 2025-04-02 RX ORDER — ALBUTEROL SULFATE 0.83 MG/ML
3 SOLUTION RESPIRATORY (INHALATION) AS NEEDED
Status: DISCONTINUED | OUTPATIENT
Start: 2025-04-02 | End: 2025-04-02 | Stop reason: HOSPADM

## 2025-04-02 RX ORDER — EPINEPHRINE 0.3 MG/.3ML
0.3 INJECTION SUBCUTANEOUS EVERY 5 MIN PRN
Status: DISCONTINUED | OUTPATIENT
Start: 2025-04-02 | End: 2025-04-02 | Stop reason: HOSPADM

## 2025-04-02 RX ORDER — DIPHENHYDRAMINE HYDROCHLORIDE 50 MG/ML
50 INJECTION, SOLUTION INTRAMUSCULAR; INTRAVENOUS AS NEEDED
OUTPATIENT
Start: 2025-04-05

## 2025-04-02 RX ORDER — FAMOTIDINE 10 MG/ML
20 INJECTION, SOLUTION INTRAVENOUS ONCE AS NEEDED
Status: DISCONTINUED | OUTPATIENT
Start: 2025-04-02 | End: 2025-04-02 | Stop reason: HOSPADM

## 2025-04-02 RX ORDER — FAMOTIDINE 10 MG/ML
20 INJECTION, SOLUTION INTRAVENOUS ONCE AS NEEDED
OUTPATIENT
Start: 2025-04-05

## 2025-04-02 RX ORDER — TENOFOVIR DISOPROXIL FUMARATE 300 MG/1
300 TABLET, FILM COATED ORAL DAILY
Qty: 30 TABLET | Refills: 11 | Status: SHIPPED | OUTPATIENT
Start: 2025-04-02 | End: 2026-04-02

## 2025-04-02 RX ORDER — DIPHENHYDRAMINE HYDROCHLORIDE 50 MG/ML
50 INJECTION, SOLUTION INTRAMUSCULAR; INTRAVENOUS AS NEEDED
Status: DISCONTINUED | OUTPATIENT
Start: 2025-04-02 | End: 2025-04-02 | Stop reason: HOSPADM

## 2025-04-02 RX ORDER — ALBUTEROL SULFATE 0.83 MG/ML
3 SOLUTION RESPIRATORY (INHALATION) AS NEEDED
OUTPATIENT
Start: 2025-04-05

## 2025-04-02 RX ORDER — EPINEPHRINE 0.3 MG/.3ML
0.3 INJECTION SUBCUTANEOUS EVERY 5 MIN PRN
OUTPATIENT
Start: 2025-04-05

## 2025-04-02 RX ADMIN — IRON SUCROSE 300 MG: 20 INJECTION, SOLUTION INTRAVENOUS at 08:58

## 2025-04-02 ASSESSMENT — ENCOUNTER SYMPTOMS
OCCASIONAL FEELINGS OF UNSTEADINESS: 0
DEPRESSION: 0
LOSS OF SENSATION IN FEET: 0

## 2025-04-02 ASSESSMENT — PAIN SCALES - GENERAL
PAINLEVEL_OUTOF10: 0-NO PAIN
PAINLEVEL_OUTOF10: 10-WORST PAIN EVER

## 2025-04-02 NOTE — PROGRESS NOTES
Pt here for venofer infusion.  Marcella used for communication due to language barrier.  Completed infusion without incidence.  Declined post infusion observation period.  Discharged in stable condition

## 2025-04-02 NOTE — PROGRESS NOTES
Assisted patient with transportation to appts today and directed to appropriate locations.   Patient tearful at arrival to NST appt, hesistant to complete NST without addressing tooth pain.   Contacted Dr. Mcgarry, recommended tylenol for pain management.  Offered tylenol rx to patient, patient declined, and requested this NP assist with fiinding urgent dental appt and removed herself from NST, verbalized she did not want to complete NST and was ready to leave (all above discussed using Czech , kavin).  Appt scheduled with [facility] for tomorrow 4/3 at 0830 ([facility] location).   Patient brother aware, transportation arranged and dental letter sent to clinic on patients behalf.   Dental Clinic also made aware of language barrier.   Will continue to follow care and be available for coordination as needed.     Glenis Craig CNP  Complex/High Risk OB

## 2025-04-03 ENCOUNTER — OFFICE VISIT (OUTPATIENT)
Dept: URGENT CARE | Age: 25
End: 2025-04-03
Payer: MEDICAID

## 2025-04-03 ENCOUNTER — TELEPHONE (OUTPATIENT)
Dept: HEMATOLOGY/ONCOLOGY | Facility: HOSPITAL | Age: 25
End: 2025-04-03
Payer: MEDICAID

## 2025-04-03 VITALS
RESPIRATION RATE: 18 BRPM | BODY MASS INDEX: 27.57 KG/M2 | SYSTOLIC BLOOD PRESSURE: 104 MMHG | TEMPERATURE: 97.9 F | WEIGHT: 176 LBS | OXYGEN SATURATION: 98 % | DIASTOLIC BLOOD PRESSURE: 66 MMHG | HEART RATE: 93 BPM

## 2025-04-03 DIAGNOSIS — D69.6 THROMBOCYTOPENIA AFFECTING PREGNANCY, ANTEPARTUM (MULTI): ICD-10-CM

## 2025-04-03 DIAGNOSIS — O99.119 THROMBOCYTOPENIA AFFECTING PREGNANCY, ANTEPARTUM (MULTI): ICD-10-CM

## 2025-04-03 DIAGNOSIS — K02.9 DENTAL CARIES: Primary | ICD-10-CM

## 2025-04-03 PROBLEM — Z3A.36 36 WEEKS GESTATION OF PREGNANCY (HHS-HCC): Status: ACTIVE | Noted: 2025-02-15

## 2025-04-03 PROCEDURE — 1036F TOBACCO NON-USER: CPT | Performed by: EMERGENCY MEDICINE

## 2025-04-03 PROCEDURE — 99203 OFFICE O/P NEW LOW 30 MIN: CPT | Performed by: EMERGENCY MEDICINE

## 2025-04-03 PROCEDURE — RXMED WILLOW AMBULATORY MEDICATION CHARGE

## 2025-04-03 RX ORDER — AMOXICILLIN AND CLAVULANATE POTASSIUM 875; 125 MG/1; MG/1
875 TABLET, FILM COATED ORAL 2 TIMES DAILY
Qty: 20 TABLET | Refills: 0 | Status: SHIPPED | OUTPATIENT
Start: 2025-04-03

## 2025-04-03 NOTE — ASSESSMENT & PLAN NOTE
-Hx of  with IUFD in 2024   -Hx of CD in [place] with unknown uterine scar  -Planning TOLAC as above

## 2025-04-03 NOTE — PROGRESS NOTES
[facility] Follow-up  2025   2:14 PM     SUBJECTIVE    HPI: Ramez Stoddard is a 24 y.o.  at 36w0d here for RPNV. Denies contractions, bleeding, or LOF. Reports normal fetal movement.    Since last being seen in clinic, she underwent liver and spleen ultrasound, had dental appointment, was seen in urgent care, and had consult with hepatology.   She has been complaining of tooth pain for several weeks. At her visit with me two weeks ago, she was given dental information. She had an appointment where she reports they did not remove her tooth. The following day she presented for weekly NST and was upset that she was still having tooth pain. She was encouraged to take Tylenol for the pain and Glenis Craig set her up with a dental appointment through  for the following day and a dental letter was provided. She did not go to the pre set dental appointment, rather presented to urgent care where she was started on amoxicillin-clavulanate for the pain and told to follow up with a dentist. She reports today that she had her tooth pulled, she is no longer in pain and she is not taking the antibiotics.    She underwent liver and spleen ultrasound, results below. She was then seen by hepatology on . Per hepatology notes, plan to continue with tenofovir treatment while checking levels every three to six months until undetectable. Will also plan to follow blood work for seroconversion as well as LFTs, Cr, and Phos levels. She states that she hasn't been taking the Tenofovir 300mg daily because she ran out of it.     OBJECTIVE    Visit Vitals  /68 Comment: 74   Wt 76.8 kg (169 lb 4.8 oz)   BMI 26.52 kg/m²   OB Status Pregnant   Smoking Status Never   BSA 1.91 m²      General - in no acute distress, resting comfortably  CV - regular rate   Resp - non labored on room air, normal effort   Abd - gravid, soft, no obvious masses   Skin - no rashes   FHT: obtained on US today    IMAGING   US ABDOMEN LIMITED;  3/27/2025  12:40 pm  TECHNIQUE:  Multiple grayscale and color Doppler static and dynamic images of the  liver and spleen were obtained at the symptomatic area of clinically  suspected abnormality.      FINDINGS:  The liver measures 13.9 cm without focal lesions identified.  The spleen is enlarged measuring 15.5 x 5.2 x 5.8 cm with a splenic  volume of 245 mL.      IMPRESSION:  1. Splenomegaly, as described.  2. No sonographic evidence of liver cirrhosis or hepatomegaly.    ASSESSMENT & PLAN    Ramez Stoddard is a 24 y.o.  at 36w0d here for the following concerns we addressed today:    Assessment & Plan  Hepatitis B complicating pregnancy in second trimester (HHS-HCC)  -Reordered Tenofovir 300mg today, encouraged to take   -S/p hepatology consult with plan for HepB DNA levels every 3-6 months until undetectable & follow up with hepatology postpartum. Labs to be drawn today from  hepatology appointment - though lab closed today, will follow up next week at time of NST. Follow up levels   -Discussed the importance of adhering to the medication and labs recommendations as Hep B can lead to liver cirrhosis & cancer.     Orders:    tenofovir disoproxil fumarate (Viread) 300 mg tablet; Take 1 tablet (300 mg) by mouth once daily.    36 weeks gestation of pregnancy (HHS-HCC)  -GBS collected today   -Growth US today  -Delivery planning below.    Orders:    Group B Streptococcus (GBS) Prenatal Screen, Culture    History of IUFD  -Undergoing weekly NST for  surveillance  -Recent pathology review of IUFD with findings indicative of high risk for recurrence. Discussed with patient delivery timing can be from 37-39 weeks for hx of IUFD. Decision made to proceed with delivery at 37wks. To be scheduled.         Thrombocytopenia affecting pregnancy, antepartum (Multi)  -Due for repeat labs. Last Plt on 3/21 of 67  -S/p discussion with anesthesia. Patient is planning for delivery without the use of pain medications if TOLAC  is possible. She is NOT a candidate for neuraxial anesthesia. Plan for general in case of  section.   -Plan for platelet transfusion at time of delivery if needed to increase platelet count          Maternal iron deficiency anemia affecting pregnancy in third trimester, antepartum  -Has completed several IV iron infusions. Due for repeat labs.   -Last Hgb stable at 8.1 on 3/21. Follow up        Imagiin.  needed  - a Imagiin. video  was used throughout the duration of the visit       Breech presentation, single or unspecified fetus (HHS-HCC)  -Fetus breech on US today. Discussed need for  section if fetus remains breech   -Patient would like to TOLAC, if fetus is cephalic at time of delivery  - to be scheduled        Polyhydramnios in third trimester complication, fetus 1 of multiple gestation (HHS-HCC)  -MVP of 8.1 and MAURO of 27cm on today's ultrasound   -Mild poly with normal anatomy and negative gtt is likely idiopathic  -Plan for delivery next week at 37 weeks.          Delivery at 37wks to be scheduled.     Patient seen and evaluated with Dr. Noble Mcgarry MD

## 2025-04-03 NOTE — PROGRESS NOTES
"Subjective   Patient ID: Ramez Stoddard is a 24 y.o. female. They present today with a chief complaint of Dental Pain (1 month - Dental pain Pt stated she is pregnant ).    History of Present Illness    Dental Pain    This is a 24-year-old -American female presents today complaining of dental ache for the past month.  The patient is presently 35 weeks gestational age.  She denies any fever or chills.  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 She has never used smokeless tobacco. She reports that she does not drink alcohol and does not use drugs.    Review of Systems  Review of Systems   HENT:  Positive for dental problem.                                   Objective    Vitals:    25 0846   BP: 104/66   Pulse: 93   Resp: 18   Temp: 36.6 °C (97.9 °F)   SpO2: 98%   Weight: 79.8 kg (176 lb)     No LMP recorded. Patient is pregnant.    Physical Exam  The patient is awake alert oriented x 3 in no acute distress vital signs are stable examination of the oral cavity reveals some tenderness to percussion of the right upper first premolar.  No facial swelling.  Gingiva is intact.  Airway is patent.  No facial abscess palpable.  Procedures    Point of Care Test & Imaging Results from this visit  No results found for this visit on 25.   Imaging  No results found.    Cardiology, Vascular, and Other Imaging  No other imaging results found for the past 2 " days      Diagnostic study results (if any) were reviewed by Julian Tran DO.    Assessment/Plan   Allergies, medications, history, and pertinent labs/EKGs/Imaging reviewed by Julian Tran DO.     Medical Decision Making  The patient was advised to follow-up with a dentist    Orders and Diagnoses  There are no diagnoses linked to this encounter.    Medical Admin Record      Patient disposition: Home    Electronically signed by Julian Tran DO  8:53 AM

## 2025-04-03 NOTE — ASSESSMENT & PLAN NOTE
-Due for repeat labs. Last Plt on 3/21 of 67  -S/p discussion with anesthesia. Patient is planning for delivery without the use of pain medications if TOLAC is possible. She is NOT a candidate for neuraxial anesthesia. Plan for general in case of  section.   -Plan for platelet transfusion at time of delivery if needed to increase platelet count

## 2025-04-03 NOTE — ASSESSMENT & PLAN NOTE
-Has completed several IV iron infusions. Due for repeat labs.   -Last Hgb stable at 8.1 on 3/21. Follow up

## 2025-04-03 NOTE — ASSESSMENT & PLAN NOTE
-Undergoing weekly NST for  surveillance  -Recent pathology review of IUFD with findings indicative of high risk for recurrence. Discussed with patient delivery timing can be from 37-39 weeks for hx of IUFD. Decision made to proceed with delivery at 37wks. To be scheduled.

## 2025-04-03 NOTE — TELEPHONE ENCOUNTER
RN called the patient's family member back and informed him that the patient's OB team reached out and said that the patient needs 1 more dose of IV iron since she missed some scheduled appts. He said she should be available in the morning on Monday. RN sent a message to the infusion charge team and they said that the patient should be able to come at 9am. RN sending message to Social work to see what time they can get ride set up (per Wilmar's request).

## 2025-04-03 NOTE — ASSESSMENT & PLAN NOTE
-GBS collected today   -Growth US today  -Delivery planning below.    Orders:    Group B Streptococcus (GBS) Prenatal Screen, Culture

## 2025-04-03 NOTE — TELEPHONE ENCOUNTER
RN tried calling Wilmar back to inform him that infusion will be at 9am but there was no answer and no VM set up.

## 2025-04-03 NOTE — ASSESSMENT & PLAN NOTE
-Reordered Tenofovir 300mg today, encouraged to take   -S/p hepatology consult with plan for HepB DNA levels every 3-6 months until undetectable & follow up with hepatology postpartum. Labs to be drawn today from 4/2 hepatology appointment - though lab closed today, will follow up next week at time of NST. Follow up levels   -Discussed the importance of adhering to the medication and labs recommendations as Hep B can lead to liver cirrhosis & cancer.     Orders:    tenofovir disoproxil fumarate (Viread) 300 mg tablet; Take 1 tablet (300 mg) by mouth once daily.

## 2025-04-04 ENCOUNTER — SOCIAL WORK (OUTPATIENT)
Dept: HEMATOLOGY/ONCOLOGY | Facility: HOSPITAL | Age: 25
End: 2025-04-04
Payer: MEDICAID

## 2025-04-04 NOTE — PROGRESS NOTES
Transportation Referral     Referral Source: ISAIAS De Dios RN  Multiple Rides Needed? No  First Date Transportation is needed? 4/7/2025  Ambulation: Independently  Pick-up Address: [address] [place] [address]   Patient Phone: [phone]  Accompaniment: No  Phone Receives Text Messages? Yes  Transportation Service: Roundtrip ( p:[phone]) , Reason: care coordination      Scheduled Ride(s):     Date: 4/7/2025   Appointment: [facility]   Drop off Address: [facility] [place]: [address] [place] [address]   Time: 8:30am   Return Ride: Will call   Confirmation: email       Pt agreeable to plan. SW will continue to follow as needed.

## 2025-04-07 ENCOUNTER — TELEPHONE (OUTPATIENT)
Dept: OBSTETRICS AND GYNECOLOGY | Facility: CLINIC | Age: 25
End: 2025-04-07
Payer: MEDICAID

## 2025-04-08 ENCOUNTER — TELEPHONE (OUTPATIENT)
Dept: HEMATOLOGY/ONCOLOGY | Facility: HOSPITAL | Age: 25
End: 2025-04-08
Payer: MEDICAID

## 2025-04-08 ENCOUNTER — TELEPHONE (OUTPATIENT)
Dept: OBSTETRICS AND GYNECOLOGY | Facility: HOSPITAL | Age: 25
End: 2025-04-08
Payer: MEDICAID

## 2025-04-08 NOTE — TELEPHONE ENCOUNTER
"Called patient to assist with transportation and remind about to tomorrow's MFM/Ultrasound visit.   Spoke with Wilmar (Patient's Brother).    scheduled for 12:30. Return Ride for \"will call\" please notify this NP when patient appt complete.     Glenis Craig CNP  Complex/High Risk OB   [phone]    "

## 2025-04-08 NOTE — TELEPHONE ENCOUNTER
RN called the patient's family member Wilmar at primary number and asked when would be a good day for the patient to come in to get third dose of iron since she was unable to come in yesterday. He said that Monday would be good since she will already be seeing Dr. Booker that day. RN will send message to charge nurse to see if there is availability. Wilmar requested if social work could help set up transport once appt time is set.

## 2025-04-09 ENCOUNTER — HOSPITAL ENCOUNTER (OUTPATIENT)
Dept: RADIOLOGY | Facility: CLINIC | Age: 25
Discharge: HOME | End: 2025-04-09
Payer: MEDICAID

## 2025-04-09 ENCOUNTER — PHARMACY VISIT (OUTPATIENT)
Dept: PHARMACY | Facility: CLINIC | Age: 25
End: 2025-04-09
Payer: MEDICAID

## 2025-04-09 ENCOUNTER — APPOINTMENT (OUTPATIENT)
Dept: MATERNAL FETAL MEDICINE | Facility: CLINIC | Age: 25
End: 2025-04-09
Payer: MEDICAID

## 2025-04-09 ENCOUNTER — APPOINTMENT (OUTPATIENT)
Dept: OBSTETRICS AND GYNECOLOGY | Facility: CLINIC | Age: 25
End: 2025-04-09
Payer: MEDICAID

## 2025-04-09 ENCOUNTER — SOCIAL WORK (OUTPATIENT)
Dept: HEMATOLOGY/ONCOLOGY | Facility: HOSPITAL | Age: 25
End: 2025-04-09
Payer: MEDICAID

## 2025-04-09 ENCOUNTER — ROUTINE PRENATAL (OUTPATIENT)
Dept: MATERNAL FETAL MEDICINE | Facility: CLINIC | Age: 25
End: 2025-04-09
Payer: MEDICAID

## 2025-04-09 VITALS — DIASTOLIC BLOOD PRESSURE: 68 MMHG | SYSTOLIC BLOOD PRESSURE: 105 MMHG | BODY MASS INDEX: 26.52 KG/M2 | WEIGHT: 169.3 LBS

## 2025-04-09 DIAGNOSIS — O98.413: ICD-10-CM

## 2025-04-09 DIAGNOSIS — Z87.59 HISTORY OF IUFD: ICD-10-CM

## 2025-04-09 DIAGNOSIS — B19.10: Primary | ICD-10-CM

## 2025-04-09 DIAGNOSIS — D50.9 MATERNAL IRON DEFICIENCY ANEMIA AFFECTING PREGNANCY IN THIRD TRIMESTER, ANTEPARTUM: ICD-10-CM

## 2025-04-09 DIAGNOSIS — O98.412: Primary | ICD-10-CM

## 2025-04-09 DIAGNOSIS — Z3A.36 36 WEEKS GESTATION OF PREGNANCY (HHS-HCC): ICD-10-CM

## 2025-04-09 DIAGNOSIS — O99.013 MATERNAL IRON DEFICIENCY ANEMIA AFFECTING PREGNANCY IN THIRD TRIMESTER, ANTEPARTUM: ICD-10-CM

## 2025-04-09 DIAGNOSIS — Z75.8: ICD-10-CM

## 2025-04-09 DIAGNOSIS — D69.6 THROMBOCYTOPENIA AFFECTING PREGNANCY, ANTEPARTUM (MULTI): ICD-10-CM

## 2025-04-09 DIAGNOSIS — O99.119 THROMBOCYTOPENIA AFFECTING PREGNANCY, ANTEPARTUM (MULTI): ICD-10-CM

## 2025-04-09 DIAGNOSIS — B19.10: ICD-10-CM

## 2025-04-09 DIAGNOSIS — O40.3XX1 POLYHYDRAMNIOS IN THIRD TRIMESTER COMPLICATION, FETUS 1 OF MULTIPLE GESTATION (HHS-HCC): ICD-10-CM

## 2025-04-09 PROCEDURE — 76816 OB US FOLLOW-UP PER FETUS: CPT

## 2025-04-09 PROCEDURE — 76819 FETAL BIOPHYS PROFIL W/O NST: CPT

## 2025-04-09 PROCEDURE — 99215 OFFICE O/P EST HI 40 MIN: CPT | Mod: 25,GC | Performed by: STUDENT IN AN ORGANIZED HEALTH CARE EDUCATION/TRAINING PROGRAM

## 2025-04-09 PROCEDURE — 99215 OFFICE O/P EST HI 40 MIN: CPT | Performed by: STUDENT IN AN ORGANIZED HEALTH CARE EDUCATION/TRAINING PROGRAM

## 2025-04-09 RX ORDER — TENOFOVIR DISOPROXIL FUMARATE 300 MG/1
300 TABLET, FILM COATED ORAL DAILY
Qty: 30 TABLET | Refills: 11 | Status: SHIPPED | OUTPATIENT
Start: 2025-04-09 | End: 2026-04-09

## 2025-04-09 NOTE — PROGRESS NOTES
Transportation Referral      Referral Source: ISAIAS De Dios RN  Multiple Rides Needed? No  First Date Transportation is needed? 4/14/2025  Ambulation: Independently  Pick-up Address: [address] [place] [address]   Patient Phone: [phone]  Accompaniment: No  Phone Receives Text Messages? Yes  Transportation Service: Roundtrip ( p:[phone]) , Reason: care coordination        Scheduled Ride(s):      Date: 4/14/2025   Appointment: [facility]   [place] [facility] [place]: [address] [place] [address]   Time: 8:00am   Return Ride: Will call   Confirmation: email         Pt and family agreeable to plan. SW will continue to follow as needed.

## 2025-04-11 ENCOUNTER — TELEPHONE (OUTPATIENT)
Dept: OBSTETRICS AND GYNECOLOGY | Facility: HOSPITAL | Age: 25
End: 2025-04-11
Payer: MEDICAID

## 2025-04-11 DIAGNOSIS — Z87.59 HISTORY OF IUFD: ICD-10-CM

## 2025-04-11 DIAGNOSIS — Z98.891 PREVIOUS CESAREAN SECTION: ICD-10-CM

## 2025-04-11 NOTE — TELEPHONE ENCOUNTER
Called patient to update on scheduled c/s information.   Spoke with brother, patient was present, reviewed c/s scheduled for 4/17 and patient to arrive at 11:30 to main campus Labor and Delivery.   Both confirmed understanding.   Reviewed patient's son needs to be accompanied by an adult if visiting and can not stay overnight with patient during hospitals stay.   Brother verbalized understanding and stated he will accompany her to labor and delivery, no transportation needed on this day.   Also reviewed next weeks appts and need to complete lab work on Monday.   Will continue to follow care and be available for coordination of care as needed.     Glenis Craig, LUDWIG  Complex/High Risk OB   [phone]

## 2025-04-12 LAB — GP B STREP SPEC QL CULT: NORMAL

## 2025-04-14 ENCOUNTER — DOCUMENTATION (OUTPATIENT)
Dept: OBSTETRICS AND GYNECOLOGY | Facility: HOSPITAL | Age: 25
End: 2025-04-14
Payer: MEDICAID

## 2025-04-14 ENCOUNTER — HOSPITAL ENCOUNTER (OUTPATIENT)
Facility: HOSPITAL | Age: 25
Discharge: HOME | End: 2025-04-14
Attending: OBSTETRICS & GYNECOLOGY | Admitting: OBSTETRICS & GYNECOLOGY
Payer: MEDICAID

## 2025-04-14 ENCOUNTER — APPOINTMENT (OUTPATIENT)
Dept: HEMATOLOGY/ONCOLOGY | Facility: HOSPITAL | Age: 25
End: 2025-04-14
Payer: MEDICAID

## 2025-04-14 VITALS
SYSTOLIC BLOOD PRESSURE: 116 MMHG | DIASTOLIC BLOOD PRESSURE: 66 MMHG | OXYGEN SATURATION: 100 % | WEIGHT: 171.41 LBS | TEMPERATURE: 97.9 F | RESPIRATION RATE: 18 BRPM | HEIGHT: 67 IN | HEART RATE: 111 BPM | BODY MASS INDEX: 26.9 KG/M2

## 2025-04-14 DIAGNOSIS — Z98.891 PREVIOUS CESAREAN SECTION: ICD-10-CM

## 2025-04-14 DIAGNOSIS — Z87.59 HISTORY OF IUFD: ICD-10-CM

## 2025-04-14 PROBLEM — J11.1 FLU: Status: ACTIVE | Noted: 2025-04-14

## 2025-04-14 LAB
ABO GROUP (TYPE) IN BLOOD: NORMAL
ALBUMIN SERPL BCP-MCNC: 3.6 G/DL (ref 3.4–5)
ALP SERPL-CCNC: 94 U/L (ref 33–110)
ALT SERPL W P-5'-P-CCNC: 7 U/L (ref 7–45)
ANTIBODY SCREEN: NORMAL
AST SERPL W P-5'-P-CCNC: 16 U/L (ref 9–39)
BASOPHILS # BLD AUTO: 0.01 X10*3/UL (ref 0–0.1)
BASOPHILS NFR BLD AUTO: 0.3 %
BILIRUB DIRECT SERPL-MCNC: 0.3 MG/DL (ref 0–0.3)
BILIRUB SERPL-MCNC: 1.5 MG/DL (ref 0–1.2)
DACRYOCYTES BLD QL SMEAR: NORMAL
EOSINOPHIL # BLD AUTO: 0.09 X10*3/UL (ref 0–0.7)
EOSINOPHIL NFR BLD AUTO: 2.9 %
ERYTHROCYTE [DISTWIDTH] IN BLOOD BY AUTOMATED COUNT: 21.6 % (ref 11.5–14.5)
FERRITIN SERPL-MCNC: 42 NG/ML (ref 8–150)
FIBRINOGEN PPP-MCNC: 196 MG/DL (ref 200–400)
HCT VFR BLD AUTO: 31.5 % (ref 36–46)
HGB BLD-MCNC: 9.3 G/DL (ref 12–16)
HGB RETIC QN: 27 PG (ref 28–38)
IMM GRANULOCYTES # BLD AUTO: 0.01 X10*3/UL (ref 0–0.7)
IMM GRANULOCYTES NFR BLD AUTO: 0.3 % (ref 0–0.9)
IMMATURE RETIC FRACTION: 25 %
INR PPP: 1.1 (ref 0.9–1.1)
IRON SATN MFR SERPL: ABNORMAL %
IRON SERPL-MCNC: 58 UG/DL (ref 35–150)
LYMPHOCYTES # BLD AUTO: 0.5 X10*3/UL (ref 1.2–4.8)
LYMPHOCYTES NFR BLD AUTO: 16.2 %
MCH RBC QN AUTO: 24.3 PG (ref 26–34)
MCHC RBC AUTO-ENTMCNC: 29.5 G/DL (ref 32–36)
MCV RBC AUTO: 83 FL (ref 80–100)
MONOCYTES # BLD AUTO: 0.11 X10*3/UL (ref 0.1–1)
MONOCYTES NFR BLD AUTO: 3.6 %
NEUTROPHILS # BLD AUTO: 2.37 X10*3/UL (ref 1.2–7.7)
NEUTROPHILS NFR BLD AUTO: 76.7 %
NRBC BLD-RTO: 0 /100 WBCS (ref 0–0)
OVALOCYTES BLD QL SMEAR: NORMAL
PLATELET # BLD AUTO: 95 X10*3/UL (ref 150–450)
POLYCHROMASIA BLD QL SMEAR: NORMAL
PROT SERPL-MCNC: 6.1 G/DL (ref 6.4–8.2)
PROTHROMBIN TIME: 11.6 SECONDS (ref 9.8–12.4)
RBC # BLD AUTO: 3.82 X10*6/UL (ref 4–5.2)
RBC MORPH BLD: NORMAL
RETICS #: 0.13 X10*6/UL (ref 0.02–0.08)
RETICS/RBC NFR AUTO: 3.4 % (ref 0.5–2)
RH FACTOR (ANTIGEN D): NORMAL
TIBC SERPL-MCNC: ABNORMAL UG/DL
UIBC SERPL-MCNC: >450 UG/DL (ref 110–370)
WBC # BLD AUTO: 3.1 X10*3/UL (ref 4.4–11.3)

## 2025-04-14 PROCEDURE — 99214 OFFICE O/P EST MOD 30 MIN: CPT | Mod: 25

## 2025-04-14 PROCEDURE — 36415 COLL VENOUS BLD VENIPUNCTURE: CPT

## 2025-04-14 PROCEDURE — 82728 ASSAY OF FERRITIN: CPT

## 2025-04-14 PROCEDURE — 85025 COMPLETE CBC W/AUTO DIFF WBC: CPT

## 2025-04-14 PROCEDURE — 59025 FETAL NON-STRESS TEST: CPT

## 2025-04-14 PROCEDURE — 85610 PROTHROMBIN TIME: CPT

## 2025-04-14 PROCEDURE — 86901 BLOOD TYPING SEROLOGIC RH(D): CPT

## 2025-04-14 PROCEDURE — 85045 AUTOMATED RETICULOCYTE COUNT: CPT

## 2025-04-14 PROCEDURE — 84075 ASSAY ALKALINE PHOSPHATASE: CPT

## 2025-04-14 PROCEDURE — 86923 COMPATIBILITY TEST ELECTRIC: CPT

## 2025-04-14 PROCEDURE — 85384 FIBRINOGEN ACTIVITY: CPT

## 2025-04-14 PROCEDURE — 83540 ASSAY OF IRON: CPT

## 2025-04-14 PROCEDURE — 4500999001 HC ED NO CHARGE

## 2025-04-14 PROCEDURE — 99215 OFFICE O/P EST HI 40 MIN: CPT | Performed by: OBSTETRICS & GYNECOLOGY

## 2025-04-14 PROCEDURE — 87517 HEPATITIS B DNA QUANT: CPT

## 2025-04-14 ASSESSMENT — COLUMBIA-SUICIDE SEVERITY RATING SCALE - C-SSRS
1. IN THE PAST MONTH, HAVE YOU WISHED YOU WERE DEAD OR WISHED YOU COULD GO TO SLEEP AND NOT WAKE UP?: NO
2. HAVE YOU ACTUALLY HAD ANY THOUGHTS OF KILLING YOURSELF?: NO
6. HAVE YOU EVER DONE ANYTHING, STARTED TO DO ANYTHING, OR PREPARED TO DO ANYTHING TO END YOUR LIFE?: NO

## 2025-04-14 ASSESSMENT — PAIN SCALES - GENERAL: PAINLEVEL_OUTOF10: 0 - NO PAIN

## 2025-04-14 NOTE — SIGNIFICANT EVENT
25  12:07 PM    Brief entry note.     In to discuss high risk plan of care after obstetric evaluation and discharge. Patient has complex medical history, chronic hepatitis B, pancytopenia. Had heme appointment today, unable to get this done given evaluation on labor and delivery for now resolved decreased fetal movement.    Discussed with hematology, who was amenable to seeing patient in triage. However, patient does not wish to stay for this, states labs can be followed outpatient on Wednesday. No other complaints today, desires discharge home. Reviewed recommendation to wait for lab results to allow for optimization prior to scheduled  section on Thursday, patient declines.     See triage note for obstetric evaluation.     Janett Guerrero MD  MFM Fellow

## 2025-04-14 NOTE — H&P
OB Triage H&P    Assessment/Plan    Ramez Stoddard is a 24 y.o.  at 36w5d, ALFRED: 2025, by Ultrasound, who presents to triage with decreased fetal movement. Patient has maternal complex plan of care.    Plan      Decreased Fetal Movement, IUP at 36.5 wga  - , moderate variability, + accelerations, - decelerations, category I tracing  - NST reactive  - Good fetal movement in triage  - Mild polyhydramnios noted 4/10, breech 4/10  - Next appt   - IOL/CS if fetus remains breech scheduled for      Pancytopenia  - Hgb improved to 9.3 and platelets to 95 today (8.1 and 67, respectively on 3/21)  - IV iron sucrose, 2 of 3 doses administered- patient missed infusion appt this morning  - Counseled on recommendation for IV iron and hematology consult prior to delivery, patient declines admission at this time to accomplish these things  - Dr. Booker unable to accommodate patient in clinic today and unable to accommodate via virtual visit prior to delivery admission ()  - All labs ordered by hematology outpatient collected in triage including a T&S for pre-op purposes.  - Glenis Craig, complex care coordinator updated  - For hematology consult on admission     Hepatitis B  - Follows with hepatology  - Not taking Tenofovir- has not picked up prescription  - Encouraged patient to  rx and start taking  - HBV DNA quant collected today  - For ID referral postpartum    Flu  - Dx by swab at urgent care last week, records unavailable for review  - VS WNL, afebrile  - No signs of systemic infection at this time  - Continue tamiflu (started last week, unknown start date)    Dispo  -Patient appropriate for discharge home, agrees with plan  -Return precautions discussed   -Follow up at next scheduled OB appointment or to triage sooner as needed    Concerns discussed with the patient due to multiple unaddressed issues that have not been followed up in the outpatient setting.  There is concern  "for thrombocytopenia with delivery planning, Hep B without viral levels to help guide peds care of the baby in addition to treatment of the disease and liver function, anemia and following iron levels and appropriate treatment with IV iron.  The patient was very worried about having the blood drawn and stated multiple times through the  that \"I have had enough tubes of blood drawn this pregnancy.\"  We discussed the reasoning of why each was important, the reason for each lab and how that helps with medical decision making in ensuring that she and the baby are receiving appropriate care.  We discussed our concern with her platelet levels and the possible need for plt transfusion prior to a C/S if the baby is still breech.  She asked that we delay drawing blood until Wednesday appointment- however we discussed that we needed to know if we needed heme sooner than that so that we can order the appropriate blood products. After this 45 minute conversation the patient agreed to get her blood drawn, however only two tubes.  On looking at all of the labs that had been ordered by the several services- we realized that that would be 5 tubes and the patient stated that she would only allow two.  We then agreed that we could draw one 10 ml \"tube\" and distribute to the appropriate lab Vacutainers for the labs needed.  This visit due to the explanations of the plan of care and the necessity of all of the care taking 70 minutes.  Danisha SANTOS      Discussed plan and reviewed with: Dr. Raman    Pregnancy Problems (from 02/14/25 to present)       Problem Noted Diagnosed Resolved    Maternal iron deficiency anemia affecting pregnancy in third trimester, antepartum 3/18/2025 by Rocco Meléndez MD  No    Priority:  Medium       Overview Addendum 4/9/2025  5:01 PM by Lisa Mcgarry MD     Hgb of 8   [x] heme consult   [x] IV iron infusions completed         Fibrinogen decreased (Multi) 3/12/2025 by Lisa Mcgarry MD  No    " Priority:  Medium       Overview Signed 3/12/2025  4:40 PM by Lisa Mcgarry MD     Fibrinogen on 3/4 of 214   [] give FFP & cryo if encounter bleeding during delivery          Genetic carrier status 3/5/2025 by Lisa Mcgarry MD  No    Priority:  Medium       Overview Signed 3/5/2025 10:02 AM by Lisa Mcgarry MD     Pt is an uncertain SMA carrier. Carrier testing showing 2 copies of SMA + SNP. No partner testing.   [] genetic counseling   [] partner testing         Hepatitis A antibody positive 3/5/2025 by Lisa Mcgarry MD  No    Priority:  Medium       Overview Addendum 3/25/2025 10:13 AM by Lisa Mcgarry MD     Hep A AB total is positive   [x] follow up differentiation - IgM negative         Sinhala  needed 3/4/2025 by Lisa Mcgarry MD  No    Priority:  Medium       History of IUFD 2/15/2025 by Lisa Mcgarry MD  No    Priority:  Medium       Overview Addendum 3/26/2025  4:42 PM by Lisa Mcgarry MD     2024 - IUFD at 39.3wks, delivery via , complicated by placental abruption   [x] weekly NST at 32 wks   [x] genetic screening - rr cfDNA  [x] APLS drawn and wnl on           36 weeks gestation of pregnancy ([facility]) 2/15/2025 by Lisa Mcgarry MD  No    Priority:  Medium       Overview Addendum 2025  5:01 PM by Lisa Mcgarry MD     Desired provider in labor: [] CNM  [x] Physician  [x] Initial BMI: unable to be calculated   [x] Prenatal Labs: Hep B positive, pancytopenia, otherwise WNL  [x] Rh status: positive    [x] Genetic Screening: rr cfDNA  [x] Pregnancy dated by: bedside US on      [x] Anatomy US: incomplete on  though no malformations  [x] 1hr GCT at 24-28wks: 64  [x] Fetal Surveillance (if indicated): weekly at 32wk for IUFD  [x] Tdap (27-36wks): 25     [x] Breastfeeding: planning breast feeding  [x] Postpartum Birth control method: declines  [] GBS at 36 - 37 wks: collected on   [x] 39 weeks discussion of IOL vs. Expectant management:  37wks  [x] Mode of delivery (  anticipated ): desires TOLAC           Pancytopenia 2025 by JACQUES Camarena-CNP  No    Priority:  Medium       Overview Addendum 4/3/2025 11:10 AM by Lisa Mcgarry MD     Diagnosed on  in triage visit with WBC of 3.0, Hgb of 8.3, and Plt of 40   [x] Hematology appointment on 3/3 - iron infusions planned    Work Up:  CBC with diff, reticulocytes consistent with ongoing pancytopenia.   LDH/uric acid, haptoglobin, direct bili overall non concerning for hemyolysis.   Coags, fibrinogen unremarkable.   Peripheral smear without evidence of malaria  Copper level WNL  Myraid cfDNA - risk reducing.    [x] Liver and spleen ultrasound completed showing splenomegaly, no liver pathology          History of postpartum depression, currently pregnant ([facility]) 2024 by JACQUES Camarena-CNP  No    Priority:  Medium       Thrombocytopenia affecting pregnancy, antepartum (Multi) 7/10/2024 by JACQUES Hines-CRNA  No    Priority:  Medium       Overview Addendum 3/25/2025 10:39 AM by Lisa Mcgarry MD     Plt in triage on  of 40, back to baseline with Plt of 67 on 3/21  Hx of thrombocytopenia outside of pregnancy  [x] hematology consult - etiology attributed to liver and spleen pathology  [] If need to increased plt, given plt transfusion         History of  delivery, currently pregnant ([facility]) 2023 by JACQUES Camaerna-CNP  No    Priority:  Medium       Overview Addendum 2025  3:48 PM by Lisa Mcgarry MD     Performed in [facility], unknown uterine incision   S/p  in setting of IUFD in   MFMU score of 94% - desires TOLAC         Hepatitis B complicating pregnancy in second trimester ([facility]) 2023 by JACQUES Camarena-CNP  No    Priority:  Medium       Overview Addendum 4/3/2025 10:55 AM by Lisa Mcgarry MD     Hep Be Ag positive ()   Hx of chronic Hep B - seen by ID in 2023   [x] HepB DNA 169K > started tenofovir 300mg every day > 20K  [x] hepatology consult - follow with labs  "& plan postpartum visit  [x] follow up liver US - no hepatomegaly or cirrhosis   [] baby to get HIB & Hep B vaccines  [] make peds aware  [] needs ID consult after delivery          Polyhydramnios in third trimester ([facility]) 2025 by Lisa Mcgarry MD  3/12/2025 by Lisa Mcgarry MD    Overview Addendum 2025  4:15 PM by Angeli Lou MD     MAURO of 26 at 29.0wks   [x] 1hr gtt- 64  [x] anatomy - incomplete though no malformations                  Subjective   No fetal movement since yesterday morning.  Denies vaginal bleeding., Denies contractions., Denies leaking of fluid.      Patient reports feeling no fetal movement since yesterday morning. She last ate at 2200 last night and had some tea this morning. She has not been taking the Tenofovir because she ran out and has not picked up the prescription yet. She does not want to go to the hematology appointment- she declines infusion.  She did not know that there is also a physician appointment this am as well.    Prenatal Provider Dr. Mcgarry    OB History    Para Term  AB Living   3 2 2 0 0 1   SAB IAB Ectopic Multiple Live Births   0 0 0 0 1      # Outcome Date GA Lbr Javier/2nd Weight Sex Type Anes PTL Lv   3 Current            2 Term 07/10/24 39w3d / 00:07 3.53 kg  Vag-Spont None  FD      Complications: IUFD at 20 weeks or more of gestation      Name: Armin Stoddard      Apgar1: 0  Apgar5: 0   1 Term      CS-Unspec   EDILBERTO       Past Surgical History:   Procedure Laterality Date     SECTION, LOW TRANSVERSE      unknwon uterine scare, surgery in [place] because \"baby too big\"       Social History     Tobacco Use    Smoking status: Never    Smokeless tobacco: Never   Substance Use Topics    Alcohol use: Never       No Known Allergies    No medications prior to admission.     Objective     Last Vitals  Temp Pulse Resp BP MAP O2 Sat   36.6 °C (97.9 °F) (!) 111 18 116/66 85 100 %     Blood Pressures         2025  0752 " 4/14/2025  0820          BP: 110/76 116/66               Physical Exam  General: NAD, mood appropriate  Cardiopulmonary: warm and well perfused, breathing comfortably on room air  Abdomen: Gravid, non-tender  Extremities: Symmetric  Speculum Exam: deferred  Cervix:   /  /       Fetal Monitoring  Baseline: 145 bpm, Variability: moderate,  Accelerations: present and Decelerations: none  Uterine Activity: Irregular contractions  Interpretation: Reactive    Bedside ultrasound: No    Labs in chart were reviewed.   Results from last 7 days   Lab Units 04/14/25  1050   WBC AUTO x10*3/uL 3.1*   HEMOGLOBIN g/dL 9.3*   HEMATOCRIT % 31.5*   PLATELETS AUTO x10*3/uL 95*

## 2025-04-14 NOTE — PROGRESS NOTES
Patient Complex care plan discussed updated,  see Pregnancy Overview and Plan Section or HR plan of care section.    Glenis Craig CNP  Complex/High Risk OB   [phone]

## 2025-04-15 DIAGNOSIS — D68.2: Primary | ICD-10-CM

## 2025-04-16 ENCOUNTER — PROCEDURE VISIT (OUTPATIENT)
Dept: OBSTETRICS AND GYNECOLOGY | Facility: CLINIC | Age: 25
End: 2025-04-16
Payer: MEDICAID

## 2025-04-16 ENCOUNTER — DOCUMENTATION (OUTPATIENT)
Dept: OBSTETRICS AND GYNECOLOGY | Facility: HOSPITAL | Age: 25
End: 2025-04-16
Payer: MEDICAID

## 2025-04-16 ENCOUNTER — HOSPITAL ENCOUNTER (OUTPATIENT)
Dept: RADIOLOGY | Facility: CLINIC | Age: 25
Discharge: HOME | End: 2025-04-16
Payer: MEDICAID

## 2025-04-16 ENCOUNTER — TELEPHONE (OUTPATIENT)
Dept: OBSTETRICS AND GYNECOLOGY | Facility: HOSPITAL | Age: 25
End: 2025-04-16
Payer: MEDICAID

## 2025-04-16 ENCOUNTER — APPOINTMENT (OUTPATIENT)
Dept: MATERNAL FETAL MEDICINE | Facility: CLINIC | Age: 25
End: 2025-04-16
Payer: MEDICAID

## 2025-04-16 VITALS
WEIGHT: 169 LBS | DIASTOLIC BLOOD PRESSURE: 69 MMHG | HEART RATE: 101 BPM | SYSTOLIC BLOOD PRESSURE: 106 MMHG | BODY MASS INDEX: 26.47 KG/M2

## 2025-04-16 DIAGNOSIS — D61.818 PANCYTOPENIA: ICD-10-CM

## 2025-04-16 DIAGNOSIS — Z87.59 HISTORY OF IUFD: ICD-10-CM

## 2025-04-16 PROCEDURE — 76819 FETAL BIOPHYS PROFIL W/O NST: CPT

## 2025-04-16 PROCEDURE — 76815 OB US LIMITED FETUS(S): CPT

## 2025-04-16 NOTE — TELEPHONE ENCOUNTER
"Late entry:  Called patient to arrange transportation to 4/16 NST.   scheduled for 4/16 at 12:15, return ride \"will call\", notify this NP when appt complete.   Will continue to follow care and be available fo coordination as needed.     Glenis Craig, [facility]  Complex/High Risk OB   [phone]    "

## 2025-04-17 ENCOUNTER — ANESTHESIA EVENT (OUTPATIENT)
Dept: OBSTETRICS AND GYNECOLOGY | Facility: HOSPITAL | Age: 25
End: 2025-04-17
Payer: MEDICAID

## 2025-04-17 ENCOUNTER — SURGERY (OUTPATIENT)
Age: 25
End: 2025-04-17
Payer: MEDICAID

## 2025-04-17 ENCOUNTER — ANESTHESIA (OUTPATIENT)
Dept: OBSTETRICS AND GYNECOLOGY | Facility: HOSPITAL | Age: 25
End: 2025-04-17
Payer: MEDICAID

## 2025-04-17 ENCOUNTER — HOSPITAL ENCOUNTER (INPATIENT)
Facility: HOSPITAL | Age: 25
LOS: 3 days | Discharge: HOME | End: 2025-04-20
Attending: OBSTETRICS & GYNECOLOGY | Admitting: OBSTETRICS & GYNECOLOGY
Payer: MEDICAID

## 2025-04-17 DIAGNOSIS — G89.18 POSTOPERATIVE PAIN: Primary | ICD-10-CM

## 2025-04-17 LAB
ABO GROUP (TYPE) IN BLOOD: NORMAL
ALBUMIN SERPL BCP-MCNC: 3.3 G/DL (ref 3.4–5)
ALP SERPL-CCNC: 101 U/L (ref 33–110)
ALT SERPL W P-5'-P-CCNC: 8 U/L (ref 7–45)
ANION GAP SERPL CALC-SCNC: 13 MMOL/L (ref 10–20)
ANTIBODY SCREEN: NORMAL
APTT PPP: 26 SECONDS (ref 26–36)
APTT PPP: 33 SECONDS (ref 26–36)
AST SERPL W P-5'-P-CCNC: 19 U/L (ref 9–39)
BASOPHILS # BLD AUTO: 0.01 X10*3/UL (ref 0–0.1)
BASOPHILS NFR BLD AUTO: 0.4 %
BILIRUB SERPL-MCNC: 1.1 MG/DL (ref 0–1.2)
BLOOD EXPIRATION DATE: NORMAL
BLOOD EXPIRATION DATE: NORMAL
BUN SERPL-MCNC: 6 MG/DL (ref 6–23)
BURR CELLS BLD QL SMEAR: NORMAL
CALCIUM SERPL-MCNC: 8.3 MG/DL (ref 8.6–10.6)
CHLORIDE SERPL-SCNC: 108 MMOL/L (ref 98–107)
CO2 SERPL-SCNC: 20 MMOL/L (ref 21–32)
CREAT SERPL-MCNC: 0.29 MG/DL (ref 0.5–1.05)
DACRYOCYTES BLD QL SMEAR: NORMAL
DISPENSE STATUS: NORMAL
DISPENSE STATUS: NORMAL
EGFRCR SERPLBLD CKD-EPI 2021: >90 ML/MIN/1.73M*2
EOSINOPHIL # BLD AUTO: 0.16 X10*3/UL (ref 0–0.7)
EOSINOPHIL NFR BLD AUTO: 6.1 %
ERYTHROCYTE [DISTWIDTH] IN BLOOD BY AUTOMATED COUNT: 20.8 % (ref 11.5–14.5)
ERYTHROCYTE [DISTWIDTH] IN BLOOD BY AUTOMATED COUNT: 20.8 % (ref 11.5–14.5)
FIBRINOGEN PPP-MCNC: 178 MG/DL (ref 200–400)
FIBRINOGEN PPP-MCNC: 226 MG/DL (ref 200–400)
GLUCOSE BLD MANUAL STRIP-MCNC: 60 MG/DL (ref 74–99)
GLUCOSE SERPL-MCNC: 64 MG/DL (ref 74–99)
HAPTOGLOB SERPL NEPH-MCNC: <30 MG/DL (ref 30–200)
HBV DNA SERPL NAA+PROBE-ACNC: ABNORMAL IU/ML (ref ?–10)
HBV DNA SERPL NAA+PROBE-ACNC: DETECTED [IU]/ML
HBV DNA SERPL NAA+PROBE-LOG IU: 4.04 LOG IU/ML
HCT VFR BLD AUTO: 24.7 % (ref 36–46)
HCT VFR BLD AUTO: 28.1 % (ref 36–46)
HGB BLD-MCNC: 7.2 G/DL (ref 12–16)
HGB BLD-MCNC: 8.7 G/DL (ref 12–16)
IMM GRANULOCYTES # BLD AUTO: 0.01 X10*3/UL (ref 0–0.7)
IMM GRANULOCYTES NFR BLD AUTO: 0.4 % (ref 0–0.9)
INR PPP: 1 (ref 0.9–1.1)
INR PPP: 1.1 (ref 0.9–1.1)
LDH SERPL L TO P-CCNC: 208 U/L (ref 84–246)
LYMPHOCYTES # BLD AUTO: 0.62 X10*3/UL (ref 1.2–4.8)
LYMPHOCYTES NFR BLD AUTO: 23.6 %
MCH RBC QN AUTO: 24.7 PG (ref 26–34)
MCH RBC QN AUTO: 25.3 PG (ref 26–34)
MCHC RBC AUTO-ENTMCNC: 29.1 G/DL (ref 32–36)
MCHC RBC AUTO-ENTMCNC: 31 G/DL (ref 32–36)
MCV RBC AUTO: 82 FL (ref 80–100)
MCV RBC AUTO: 85 FL (ref 80–100)
MONOCYTES # BLD AUTO: 0.13 X10*3/UL (ref 0.1–1)
MONOCYTES NFR BLD AUTO: 4.9 %
NEUTROPHILS # BLD AUTO: 1.7 X10*3/UL (ref 1.2–7.7)
NEUTROPHILS NFR BLD AUTO: 64.6 %
NRBC BLD-RTO: 0 /100 WBCS (ref 0–0)
NRBC BLD-RTO: 0 /100 WBCS (ref 0–0)
OVALOCYTES BLD QL SMEAR: NORMAL
PLATELET # BLD AUTO: 101 X10*3/UL (ref 150–450)
PLATELET # BLD AUTO: 97 X10*3/UL (ref 150–450)
POLYCHROMASIA BLD QL SMEAR: NORMAL
POTASSIUM SERPL-SCNC: 3.6 MMOL/L (ref 3.5–5.3)
PRODUCT BLOOD TYPE: 5100
PRODUCT BLOOD TYPE: 5100
PRODUCT CODE: NORMAL
PRODUCT CODE: NORMAL
PROT SERPL-MCNC: 5.6 G/DL (ref 6.4–8.2)
PROTHROMBIN TIME: 11.4 SECONDS (ref 9.8–12.4)
PROTHROMBIN TIME: 12.4 SECONDS (ref 9.8–12.4)
RBC # BLD AUTO: 2.91 X10*6/UL (ref 4–5.2)
RBC # BLD AUTO: 3.44 X10*6/UL (ref 4–5.2)
RBC MORPH BLD: NORMAL
RH FACTOR (ANTIGEN D): NORMAL
SCHISTOCYTES BLD QL SMEAR: NORMAL
SODIUM SERPL-SCNC: 137 MMOL/L (ref 136–145)
TREPONEMA PALLIDUM IGG+IGM AB [PRESENCE] IN SERUM OR PLASMA BY IMMUNOASSAY: NONREACTIVE
UNIT ABO: NORMAL
UNIT ABO: NORMAL
UNIT NUMBER: NORMAL
UNIT NUMBER: NORMAL
UNIT RH: NORMAL
UNIT RH: NORMAL
UNIT VOLUME: 284
UNIT VOLUME: 285
WBC # BLD AUTO: 2.6 X10*3/UL (ref 4.4–11.3)
WBC # BLD AUTO: 3.7 X10*3/UL (ref 4.4–11.3)
XM INTEP: NORMAL
XM INTEP: NORMAL

## 2025-04-17 PROCEDURE — 7210000002 HC LABOR PER HOUR

## 2025-04-17 PROCEDURE — 2500000001 HC RX 250 WO HCPCS SELF ADMINISTERED DRUGS (ALT 637 FOR MEDICARE OP): Mod: SE | Performed by: STUDENT IN AN ORGANIZED HEALTH CARE EDUCATION/TRAINING PROGRAM

## 2025-04-17 PROCEDURE — 86901 BLOOD TYPING SEROLOGIC RH(D): CPT

## 2025-04-17 PROCEDURE — 83010 ASSAY OF HAPTOGLOBIN QUANT: CPT

## 2025-04-17 PROCEDURE — 85384 FIBRINOGEN ACTIVITY: CPT | Performed by: STUDENT IN AN ORGANIZED HEALTH CARE EDUCATION/TRAINING PROGRAM

## 2025-04-17 PROCEDURE — 86780 TREPONEMA PALLIDUM: CPT

## 2025-04-17 PROCEDURE — 80053 COMPREHEN METABOLIC PANEL: CPT

## 2025-04-17 PROCEDURE — 83615 LACTATE (LD) (LDH) ENZYME: CPT

## 2025-04-17 PROCEDURE — 59514 CESAREAN DELIVERY ONLY: CPT | Performed by: STUDENT IN AN ORGANIZED HEALTH CARE EDUCATION/TRAINING PROGRAM

## 2025-04-17 PROCEDURE — 2720000007 HC OR 272 NO HCPCS: Performed by: STUDENT IN AN ORGANIZED HEALTH CARE EDUCATION/TRAINING PROGRAM

## 2025-04-17 PROCEDURE — 88307 TISSUE EXAM BY PATHOLOGIST: CPT | Performed by: STUDENT IN AN ORGANIZED HEALTH CARE EDUCATION/TRAINING PROGRAM

## 2025-04-17 PROCEDURE — 1210000001 HC SEMI-PRIVATE ROOM DAILY

## 2025-04-17 PROCEDURE — 2500000005 HC RX 250 GENERAL PHARMACY W/O HCPCS: Mod: SE | Performed by: ANESTHESIOLOGIST ASSISTANT

## 2025-04-17 PROCEDURE — 85610 PROTHROMBIN TIME: CPT

## 2025-04-17 PROCEDURE — 2500000004 HC RX 250 GENERAL PHARMACY W/ HCPCS (ALT 636 FOR OP/ED): Mod: JZ,SE

## 2025-04-17 PROCEDURE — 2500000004 HC RX 250 GENERAL PHARMACY W/ HCPCS (ALT 636 FOR OP/ED): Mod: JZ,SE | Performed by: ANESTHESIOLOGIST ASSISTANT

## 2025-04-17 PROCEDURE — 82947 ASSAY GLUCOSE BLOOD QUANT: CPT

## 2025-04-17 PROCEDURE — 85384 FIBRINOGEN ACTIVITY: CPT

## 2025-04-17 PROCEDURE — 99223 1ST HOSP IP/OBS HIGH 75: CPT

## 2025-04-17 PROCEDURE — P9045 ALBUMIN (HUMAN), 5%, 250 ML: HCPCS | Mod: JZ,SE | Performed by: ANESTHESIOLOGIST ASSISTANT

## 2025-04-17 PROCEDURE — 85027 COMPLETE CBC AUTOMATED: CPT | Performed by: STUDENT IN AN ORGANIZED HEALTH CARE EDUCATION/TRAINING PROGRAM

## 2025-04-17 PROCEDURE — 2500000001 HC RX 250 WO HCPCS SELF ADMINISTERED DRUGS (ALT 637 FOR MEDICARE OP): Mod: SE | Performed by: ANESTHESIOLOGIST ASSISTANT

## 2025-04-17 PROCEDURE — 36415 COLL VENOUS BLD VENIPUNCTURE: CPT

## 2025-04-17 PROCEDURE — 7100000016 HC LABOR RECOVERY PER HOUR: Performed by: STUDENT IN AN ORGANIZED HEALTH CARE EDUCATION/TRAINING PROGRAM

## 2025-04-17 PROCEDURE — 59050 FETAL MONITOR W/REPORT: CPT

## 2025-04-17 PROCEDURE — 85025 COMPLETE CBC W/AUTO DIFF WBC: CPT

## 2025-04-17 PROCEDURE — 2500000004 HC RX 250 GENERAL PHARMACY W/ HCPCS (ALT 636 FOR OP/ED): Mod: JZ,SE | Performed by: STUDENT IN AN ORGANIZED HEALTH CARE EDUCATION/TRAINING PROGRAM

## 2025-04-17 PROCEDURE — 85610 PROTHROMBIN TIME: CPT | Performed by: STUDENT IN AN ORGANIZED HEALTH CARE EDUCATION/TRAINING PROGRAM

## 2025-04-17 PROCEDURE — 3700000014 HC AN EPIDURAL BLOCK CHARGE: Performed by: STUDENT IN AN ORGANIZED HEALTH CARE EDUCATION/TRAINING PROGRAM

## 2025-04-17 PROCEDURE — 88307 TISSUE EXAM BY PATHOLOGIST: CPT | Mod: TC,SUR | Performed by: STUDENT IN AN ORGANIZED HEALTH CARE EDUCATION/TRAINING PROGRAM

## 2025-04-17 RX ORDER — HYDROMORPHONE HYDROCHLORIDE 0.2 MG/ML
0.2 INJECTION INTRAMUSCULAR; INTRAVENOUS; SUBCUTANEOUS EVERY 5 MIN PRN
Status: DISCONTINUED | OUTPATIENT
Start: 2025-04-17 | End: 2025-04-20 | Stop reason: HOSPADM

## 2025-04-17 RX ORDER — KETOROLAC TROMETHAMINE 30 MG/ML
INJECTION, SOLUTION INTRAMUSCULAR; INTRAVENOUS AS NEEDED
Status: DISCONTINUED | OUTPATIENT
Start: 2025-04-17 | End: 2025-04-17

## 2025-04-17 RX ORDER — TENOFOVIR DISOPROXIL FUMARATE 300 MG/1
300 TABLET, FILM COATED ORAL DAILY
Status: DISCONTINUED | OUTPATIENT
Start: 2025-04-17 | End: 2025-04-20 | Stop reason: HOSPADM

## 2025-04-17 RX ORDER — TRANEXAMIC ACID 100 MG/ML
1000 INJECTION, SOLUTION INTRAVENOUS ONCE AS NEEDED
Status: DISCONTINUED | OUTPATIENT
Start: 2025-04-17 | End: 2025-04-17

## 2025-04-17 RX ORDER — LOPERAMIDE HYDROCHLORIDE 2 MG/1
4 CAPSULE ORAL EVERY 2 HOUR PRN
Status: DISCONTINUED | OUTPATIENT
Start: 2025-04-17 | End: 2025-04-20 | Stop reason: HOSPADM

## 2025-04-17 RX ORDER — LIDOCAINE HYDROCHLORIDE 10 MG/ML
30 INJECTION, SOLUTION INFILTRATION; PERINEURAL ONCE AS NEEDED
Status: DISCONTINUED | OUTPATIENT
Start: 2025-04-17 | End: 2025-04-17

## 2025-04-17 RX ORDER — SODIUM CHLORIDE, SODIUM LACTATE, POTASSIUM CHLORIDE, CALCIUM CHLORIDE 600; 310; 30; 20 MG/100ML; MG/100ML; MG/100ML; MG/100ML
75 INJECTION, SOLUTION INTRAVENOUS CONTINUOUS
Status: ACTIVE | OUTPATIENT
Start: 2025-04-17 | End: 2025-04-18

## 2025-04-17 RX ORDER — TENOFOVIR DISOPROXIL FUMARATE 300 MG/1
300 TABLET, FILM COATED ORAL DAILY
Status: DISCONTINUED | OUTPATIENT
Start: 2025-04-17 | End: 2025-04-17 | Stop reason: SDUPTHER

## 2025-04-17 RX ORDER — OXYCODONE HYDROCHLORIDE 5 MG/1
5 TABLET ORAL EVERY 4 HOURS PRN
Status: DISCONTINUED | OUTPATIENT
Start: 2025-04-18 | End: 2025-04-20 | Stop reason: HOSPADM

## 2025-04-17 RX ORDER — ONDANSETRON 4 MG/1
4 TABLET, FILM COATED ORAL EVERY 6 HOURS PRN
Status: DISCONTINUED | OUTPATIENT
Start: 2025-04-17 | End: 2025-04-20 | Stop reason: HOSPADM

## 2025-04-17 RX ORDER — OXYTOCIN/0.9 % SODIUM CHLORIDE 30/500 ML
60 PLASTIC BAG, INJECTION (ML) INTRAVENOUS ONCE AS NEEDED
Status: DISCONTINUED | OUTPATIENT
Start: 2025-04-17 | End: 2025-04-17

## 2025-04-17 RX ORDER — PHENYLEPHRINE 10 MG/250 ML(40 MCG/ML)IN 0.9 % SOD.CHLORIDE INTRAVENOUS
CONTINUOUS PRN
Status: DISCONTINUED | OUTPATIENT
Start: 2025-04-17 | End: 2025-04-17

## 2025-04-17 RX ORDER — MORPHINE SULFATE 0.5 MG/ML
INJECTION, SOLUTION EPIDURAL; INTRATHECAL; INTRAVENOUS AS NEEDED
Status: DISCONTINUED | OUTPATIENT
Start: 2025-04-17 | End: 2025-04-17

## 2025-04-17 RX ORDER — LABETALOL HYDROCHLORIDE 5 MG/ML
20 INJECTION, SOLUTION INTRAVENOUS ONCE AS NEEDED
Status: DISCONTINUED | OUTPATIENT
Start: 2025-04-17 | End: 2025-04-20 | Stop reason: HOSPADM

## 2025-04-17 RX ORDER — HYDRALAZINE HYDROCHLORIDE 20 MG/ML
5 INJECTION INTRAMUSCULAR; INTRAVENOUS ONCE AS NEEDED
Status: DISCONTINUED | OUTPATIENT
Start: 2025-04-17 | End: 2025-04-17

## 2025-04-17 RX ORDER — LOPERAMIDE HYDROCHLORIDE 2 MG/1
4 CAPSULE ORAL EVERY 2 HOUR PRN
Status: DISCONTINUED | OUTPATIENT
Start: 2025-04-17 | End: 2025-04-17

## 2025-04-17 RX ORDER — SIMETHICONE 80 MG
80 TABLET,CHEWABLE ORAL 4 TIMES DAILY PRN
Status: DISCONTINUED | OUTPATIENT
Start: 2025-04-17 | End: 2025-04-20 | Stop reason: HOSPADM

## 2025-04-17 RX ORDER — ACETAMINOPHEN 120 MG/1
SUPPOSITORY RECTAL AS NEEDED
Status: DISCONTINUED | OUTPATIENT
Start: 2025-04-17 | End: 2025-04-17

## 2025-04-17 RX ORDER — LIDOCAINE 560 MG/1
1 PATCH PERCUTANEOUS; TOPICAL; TRANSDERMAL
Status: DISCONTINUED | OUTPATIENT
Start: 2025-04-17 | End: 2025-04-20 | Stop reason: HOSPADM

## 2025-04-17 RX ORDER — ONDANSETRON 4 MG/1
4 TABLET, FILM COATED ORAL EVERY 6 HOURS PRN
Status: DISCONTINUED | OUTPATIENT
Start: 2025-04-17 | End: 2025-04-17

## 2025-04-17 RX ORDER — OXYTOCIN 10 [USP'U]/ML
10 INJECTION, SOLUTION INTRAMUSCULAR; INTRAVENOUS ONCE AS NEEDED
Status: DISCONTINUED | OUTPATIENT
Start: 2025-04-17 | End: 2025-04-17

## 2025-04-17 RX ORDER — ONDANSETRON HYDROCHLORIDE 2 MG/ML
4 INJECTION, SOLUTION INTRAVENOUS EVERY 6 HOURS PRN
Status: DISCONTINUED | OUTPATIENT
Start: 2025-04-17 | End: 2025-04-17

## 2025-04-17 RX ORDER — BUPIVACAINE HYDROCHLORIDE 7.5 MG/ML
INJECTION, SOLUTION EPIDURAL; RETROBULBAR AS NEEDED
Status: DISCONTINUED | OUTPATIENT
Start: 2025-04-17 | End: 2025-04-17

## 2025-04-17 RX ORDER — ONDANSETRON HYDROCHLORIDE 2 MG/ML
4 INJECTION, SOLUTION INTRAVENOUS EVERY 6 HOURS PRN
Status: DISCONTINUED | OUTPATIENT
Start: 2025-04-17 | End: 2025-04-20 | Stop reason: HOSPADM

## 2025-04-17 RX ORDER — PHENYLEPHRINE HYDROCHLORIDE 10 MG/ML
INJECTION INTRAVENOUS AS NEEDED
Status: DISCONTINUED | OUTPATIENT
Start: 2025-04-17 | End: 2025-04-17

## 2025-04-17 RX ORDER — ACETAMINOPHEN 325 MG/1
975 TABLET ORAL EVERY 6 HOURS
Status: DISCONTINUED | OUTPATIENT
Start: 2025-04-18 | End: 2025-04-20 | Stop reason: HOSPADM

## 2025-04-17 RX ORDER — KETOROLAC TROMETHAMINE 30 MG/ML
30 INJECTION, SOLUTION INTRAMUSCULAR; INTRAVENOUS EVERY 6 HOURS
Status: COMPLETED | OUTPATIENT
Start: 2025-04-18 | End: 2025-04-18

## 2025-04-17 RX ORDER — DEXMEDETOMIDINE HYDROCHLORIDE 100 UG/ML
INJECTION, SOLUTION INTRAVENOUS AS NEEDED
Status: DISCONTINUED | OUTPATIENT
Start: 2025-04-17 | End: 2025-04-17

## 2025-04-17 RX ORDER — METHYLERGONOVINE MALEATE 0.2 MG/ML
0.2 INJECTION INTRAVENOUS ONCE AS NEEDED
Status: DISCONTINUED | OUTPATIENT
Start: 2025-04-17 | End: 2025-04-17

## 2025-04-17 RX ORDER — MISOPROSTOL 200 UG/1
800 TABLET ORAL ONCE AS NEEDED
Status: DISCONTINUED | OUTPATIENT
Start: 2025-04-17 | End: 2025-04-20 | Stop reason: HOSPADM

## 2025-04-17 RX ORDER — METHYLERGONOVINE MALEATE 0.2 MG/ML
0.2 INJECTION INTRAVENOUS ONCE AS NEEDED
Status: DISCONTINUED | OUTPATIENT
Start: 2025-04-17 | End: 2025-04-20 | Stop reason: HOSPADM

## 2025-04-17 RX ORDER — ALBUMIN HUMAN 50 G/1000ML
SOLUTION INTRAVENOUS AS NEEDED
Status: DISCONTINUED | OUTPATIENT
Start: 2025-04-17 | End: 2025-04-17

## 2025-04-17 RX ORDER — CEFAZOLIN 1 G/1
INJECTION, POWDER, FOR SOLUTION INTRAVENOUS AS NEEDED
Status: DISCONTINUED | OUTPATIENT
Start: 2025-04-17 | End: 2025-04-17

## 2025-04-17 RX ORDER — DIPHENHYDRAMINE HYDROCHLORIDE 50 MG/ML
25 INJECTION, SOLUTION INTRAMUSCULAR; INTRAVENOUS EVERY 4 HOURS PRN
Status: DISCONTINUED | OUTPATIENT
Start: 2025-04-17 | End: 2025-04-20 | Stop reason: HOSPADM

## 2025-04-17 RX ORDER — DEXMEDETOMIDINE IN 0.9 % NACL 20 MCG/5ML
SYRINGE (ML) INTRAVENOUS AS NEEDED
Status: DISCONTINUED | OUTPATIENT
Start: 2025-04-17 | End: 2025-04-17

## 2025-04-17 RX ORDER — LABETALOL HYDROCHLORIDE 5 MG/ML
20 INJECTION, SOLUTION INTRAVENOUS ONCE AS NEEDED
Status: DISCONTINUED | OUTPATIENT
Start: 2025-04-17 | End: 2025-04-17

## 2025-04-17 RX ORDER — POLYETHYLENE GLYCOL 3350 17 G/17G
17 POWDER, FOR SOLUTION ORAL 2 TIMES DAILY PRN
Status: DISCONTINUED | OUTPATIENT
Start: 2025-04-17 | End: 2025-04-20 | Stop reason: HOSPADM

## 2025-04-17 RX ORDER — OXYTOCIN/0.9 % SODIUM CHLORIDE 30/500 ML
60 PLASTIC BAG, INJECTION (ML) INTRAVENOUS ONCE AS NEEDED
Status: DISCONTINUED | OUTPATIENT
Start: 2025-04-17 | End: 2025-04-20 | Stop reason: HOSPADM

## 2025-04-17 RX ORDER — OXYTOCIN 10 [USP'U]/ML
10 INJECTION, SOLUTION INTRAMUSCULAR; INTRAVENOUS ONCE AS NEEDED
Status: DISCONTINUED | OUTPATIENT
Start: 2025-04-17 | End: 2025-04-20 | Stop reason: HOSPADM

## 2025-04-17 RX ORDER — TERBUTALINE SULFATE 1 MG/ML
0.25 INJECTION SUBCUTANEOUS ONCE AS NEEDED
Status: DISCONTINUED | OUTPATIENT
Start: 2025-04-17 | End: 2025-04-17

## 2025-04-17 RX ORDER — OXYCODONE HYDROCHLORIDE 10 MG/1
10 TABLET ORAL EVERY 4 HOURS PRN
Status: DISCONTINUED | OUTPATIENT
Start: 2025-04-18 | End: 2025-04-20 | Stop reason: HOSPADM

## 2025-04-17 RX ORDER — HYDRALAZINE HYDROCHLORIDE 20 MG/ML
5 INJECTION INTRAMUSCULAR; INTRAVENOUS ONCE AS NEEDED
Status: DISCONTINUED | OUTPATIENT
Start: 2025-04-17 | End: 2025-04-20 | Stop reason: HOSPADM

## 2025-04-17 RX ORDER — DIPHENHYDRAMINE HCL 25 MG
25 CAPSULE ORAL EVERY 4 HOURS PRN
Status: DISCONTINUED | OUTPATIENT
Start: 2025-04-17 | End: 2025-04-20 | Stop reason: HOSPADM

## 2025-04-17 RX ORDER — ADHESIVE BANDAGE
10 BANDAGE TOPICAL
Status: DISCONTINUED | OUTPATIENT
Start: 2025-04-17 | End: 2025-04-20 | Stop reason: HOSPADM

## 2025-04-17 RX ORDER — NALOXONE HYDROCHLORIDE 0.4 MG/ML
0.1 INJECTION, SOLUTION INTRAMUSCULAR; INTRAVENOUS; SUBCUTANEOUS EVERY 5 MIN PRN
Status: DISCONTINUED | OUTPATIENT
Start: 2025-04-17 | End: 2025-04-20 | Stop reason: HOSPADM

## 2025-04-17 RX ORDER — MISOPROSTOL 200 UG/1
800 TABLET ORAL ONCE AS NEEDED
Status: DISCONTINUED | OUTPATIENT
Start: 2025-04-17 | End: 2025-04-17

## 2025-04-17 RX ORDER — CARBOPROST TROMETHAMINE 250 UG/ML
250 INJECTION, SOLUTION INTRAMUSCULAR ONCE AS NEEDED
Status: DISCONTINUED | OUTPATIENT
Start: 2025-04-17 | End: 2025-04-20 | Stop reason: HOSPADM

## 2025-04-17 RX ORDER — FAMOTIDINE 10 MG/ML
INJECTION INTRAVENOUS AS NEEDED
Status: DISCONTINUED | OUTPATIENT
Start: 2025-04-17 | End: 2025-04-17

## 2025-04-17 RX ORDER — CARBOPROST TROMETHAMINE 250 UG/ML
250 INJECTION, SOLUTION INTRAMUSCULAR ONCE AS NEEDED
Status: DISCONTINUED | OUTPATIENT
Start: 2025-04-17 | End: 2025-04-17

## 2025-04-17 RX ORDER — TRANEXAMIC ACID 100 MG/ML
1000 INJECTION, SOLUTION INTRAVENOUS ONCE AS NEEDED
Status: DISCONTINUED | OUTPATIENT
Start: 2025-04-17 | End: 2025-04-20 | Stop reason: HOSPADM

## 2025-04-17 RX ORDER — IBUPROFEN 600 MG/1
600 TABLET ORAL EVERY 6 HOURS
Status: DISCONTINUED | OUTPATIENT
Start: 2025-04-18 | End: 2025-04-18

## 2025-04-17 RX ORDER — DEXTROSE 50 % IN WATER (D50W) INTRAVENOUS SYRINGE
AS NEEDED
Status: DISCONTINUED | OUTPATIENT
Start: 2025-04-17 | End: 2025-04-17

## 2025-04-17 RX ADMIN — DEXTROSE MONOHYDRATE 12.5 G: 500 INJECTION PARENTERAL at 17:50

## 2025-04-17 RX ADMIN — Medication 4 MCG: at 18:51

## 2025-04-17 RX ADMIN — PHENYLEPHRINE HYDROCHLORIDE 80 MCG: 10 INJECTION INTRAVENOUS at 19:20

## 2025-04-17 RX ADMIN — SODIUM CHLORIDE, SODIUM LACTATE, POTASSIUM CHLORIDE, AND CALCIUM CHLORIDE 75 ML/HR: .6; .31; .03; .02 INJECTION, SOLUTION INTRAVENOUS at 13:09

## 2025-04-17 RX ADMIN — OXYTOCIN 600 MILLI-UNITS/MIN: 10 INJECTION, SOLUTION INTRAMUSCULAR; INTRAVENOUS at 18:52

## 2025-04-17 RX ADMIN — ONDANSETRON 4 MG: 2 INJECTION INTRAMUSCULAR; INTRAVENOUS at 17:44

## 2025-04-17 RX ADMIN — MORPHINE SULFATE 3 MG: 0.5 INJECTION EPIDURAL; INTRATHECAL; INTRAVENOUS at 19:07

## 2025-04-17 RX ADMIN — HYDROMORPHONE HYDROCHLORIDE 0.2 MG: 0.2 INJECTION, SOLUTION INTRAMUSCULAR; INTRAVENOUS; SUBCUTANEOUS at 23:25

## 2025-04-17 RX ADMIN — ALBUMIN HUMAN 250 ML: 0.05 INJECTION, SOLUTION INTRAVENOUS at 19:07

## 2025-04-17 RX ADMIN — DEXMEDETOMIDINE HYDROCHLORIDE 4 MCG: 100 INJECTION, SOLUTION, CONCENTRATE INTRAVENOUS at 18:15

## 2025-04-17 RX ADMIN — BENZOCAINE AND MENTHOL 1 LOZENGE: 15; 3.6 LOZENGE ORAL at 22:48

## 2025-04-17 RX ADMIN — ACETAMINOPHEN 650 MG: 120 SUPPOSITORY RECTAL at 19:25

## 2025-04-17 RX ADMIN — KETOROLAC TROMETHAMINE 30 MG: 30 INJECTION, SOLUTION INTRAMUSCULAR; INTRAVENOUS at 19:08

## 2025-04-17 RX ADMIN — CEFAZOLIN 2 G: 1 INJECTION, POWDER, FOR SOLUTION INTRAMUSCULAR; INTRAVENOUS at 18:01

## 2025-04-17 RX ADMIN — SODIUM CHLORIDE, SODIUM LACTATE, POTASSIUM CHLORIDE, AND CALCIUM CHLORIDE: 600; 310; 30; 20 INJECTION, SOLUTION INTRAVENOUS at 17:46

## 2025-04-17 RX ADMIN — HYDROMORPHONE HYDROCHLORIDE 0.2 MG: 0.2 INJECTION, SOLUTION INTRAMUSCULAR; INTRAVENOUS; SUBCUTANEOUS at 21:24

## 2025-04-17 RX ADMIN — BUPIVACAINE HYDROCHLORIDE 2 ML: 7.5 INJECTION, SOLUTION EPIDURAL; RETROBULBAR at 18:05

## 2025-04-17 RX ADMIN — FAMOTIDINE 20 MG: 10 INJECTION, SOLUTION INTRAVENOUS at 17:44

## 2025-04-17 RX ADMIN — PHENYLEPHRINE HYDROCHLORIDE 80 MCG: 10 INJECTION INTRAVENOUS at 19:25

## 2025-04-17 RX ADMIN — PHENYLEPHRINE-NACL IV SOLUTION 10 MG/250ML-0.9% 0.73 MCG/KG/MIN: 10-0.9/25 SOLUTION at 18:06

## 2025-04-17 RX ADMIN — TRANEXAMIC ACID 1000 MG: 100 INJECTION INTRAVENOUS at 18:39

## 2025-04-17 RX ADMIN — ALBUMIN HUMAN 250 ML: 0.05 INJECTION, SOLUTION INTRAVENOUS at 18:48

## 2025-04-17 RX ADMIN — BENZOCAINE AND MENTHOL 1 LOZENGE: 15; 3.6 LOZENGE ORAL at 20:43

## 2025-04-17 SDOH — HEALTH STABILITY: MENTAL HEALTH: CURRENT SMOKER: 0

## 2025-04-17 SDOH — HEALTH STABILITY: MENTAL HEALTH: HOW OFTEN DO YOU HAVE SIX OR MORE DRINKS ON ONE OCCASION?: NEVER

## 2025-04-17 SDOH — HEALTH STABILITY: MENTAL HEALTH: HOW OFTEN DO YOU HAVE A DRINK CONTAINING ALCOHOL?: NEVER

## 2025-04-17 SDOH — SOCIAL STABILITY: SOCIAL INSECURITY: WITHIN THE LAST YEAR, HAVE YOU BEEN AFRAID OF YOUR PARTNER OR EX-PARTNER?: NO

## 2025-04-17 SDOH — SOCIAL STABILITY: SOCIAL INSECURITY: HAVE YOU HAD ANY THOUGHTS OF HARMING ANYONE ELSE?: NO

## 2025-04-17 SDOH — SOCIAL STABILITY: SOCIAL INSECURITY: WITHIN THE LAST YEAR, HAVE YOU BEEN HUMILIATED OR EMOTIONALLY ABUSED IN OTHER WAYS BY YOUR PARTNER OR EX-PARTNER?: NO

## 2025-04-17 SDOH — HEALTH STABILITY: MENTAL HEALTH: HOW MANY DRINKS CONTAINING ALCOHOL DO YOU HAVE ON A TYPICAL DAY WHEN YOU ARE DRINKING?: PATIENT DOES NOT DRINK

## 2025-04-17 SDOH — SOCIAL STABILITY: SOCIAL INSECURITY: ABUSE SCREEN: ADULT

## 2025-04-17 SDOH — ECONOMIC STABILITY: FOOD INSECURITY: WITHIN THE PAST 12 MONTHS, THE FOOD YOU BOUGHT JUST DIDN'T LAST AND YOU DIDN'T HAVE MONEY TO GET MORE.: NEVER TRUE

## 2025-04-17 SDOH — SOCIAL STABILITY: SOCIAL INSECURITY: ARE YOU OR HAVE YOU BEEN THREATENED OR ABUSED PHYSICALLY, EMOTIONALLY, OR SEXUALLY BY ANYONE?: NO

## 2025-04-17 SDOH — ECONOMIC STABILITY: FOOD INSECURITY: WITHIN THE PAST 12 MONTHS, YOU WORRIED THAT YOUR FOOD WOULD RUN OUT BEFORE YOU GOT THE MONEY TO BUY MORE.: NEVER TRUE

## 2025-04-17 SDOH — ECONOMIC STABILITY: FOOD INSECURITY: HOW HARD IS IT FOR YOU TO PAY FOR THE VERY BASICS LIKE FOOD, HOUSING, MEDICAL CARE, AND HEATING?: HARD

## 2025-04-17 SDOH — SOCIAL STABILITY: SOCIAL INSECURITY: HAS ANYONE EVER THREATENED TO HURT YOUR FAMILY OR YOUR PETS?: NO

## 2025-04-17 SDOH — ECONOMIC STABILITY: TRANSPORTATION INSECURITY: IN THE PAST 12 MONTHS, HAS LACK OF TRANSPORTATION KEPT YOU FROM MEDICAL APPOINTMENTS OR FROM GETTING MEDICATIONS?: NO

## 2025-04-17 SDOH — ECONOMIC STABILITY: HOUSING INSECURITY: DO YOU FEEL UNSAFE GOING BACK TO THE PLACE WHERE YOU ARE LIVING?: NO

## 2025-04-17 SDOH — SOCIAL STABILITY: SOCIAL INSECURITY: DOES ANYONE TRY TO KEEP YOU FROM HAVING/CONTACTING OTHER FRIENDS OR DOING THINGS OUTSIDE YOUR HOME?: NO

## 2025-04-17 ASSESSMENT — PAIN SCALES - GENERAL
PAINLEVEL_OUTOF10: 0 - NO PAIN
PAINLEVEL_OUTOF10: 9
PAINLEVEL_OUTOF10: 9
PAINLEVEL_OUTOF10: 5 - MODERATE PAIN
PAINLEVEL_OUTOF10: 5 - MODERATE PAIN
PAINLEVEL_OUTOF10: 0 - NO PAIN
PAINLEVEL_OUTOF10: 0 - NO PAIN

## 2025-04-17 ASSESSMENT — LIFESTYLE VARIABLES
SKIP TO QUESTIONS 9-10: 1
AUDIT-C TOTAL SCORE: 0

## 2025-04-17 ASSESSMENT — ACTIVITIES OF DAILY LIVING (ADL)
LACK_OF_TRANSPORTATION: NO
LACK_OF_TRANSPORTATION: NO

## 2025-04-17 ASSESSMENT — PAIN DESCRIPTION - DESCRIPTORS: DESCRIPTORS: SHARP

## 2025-04-17 NOTE — ANESTHESIA PREPROCEDURE EVALUATION
Patient: Ramez Stoddard    Evaluation Method: In-person visit    Procedure Information       Date/Time: 25 1330    Procedure:  DELIVERY    Location: [facility] 04 / Virtual [facility] OB    Surgeons: Dmitriy Dodge MD            Relevant Problems   Anesthesia (within normal limits)      Cardiac (within normal limits)      Pulmonary (within normal limits)      Neuro (within normal limits)      GI (within normal limits)      /Renal (within normal limits)      Liver   (+) Hepatitis B complicating pregnancy in second trimester ([facility])      Endocrine (within normal limits)      Hematology   (+) Maternal iron deficiency anemia affecting pregnancy in third trimester, antepartum   (+) Pancytopenia   (+) Thrombocytopenia affecting pregnancy, antepartum (Multi)      Musculoskeletal (within normal limits)      HEENT (within normal limits)      ID   (+) Flu   (+) Hepatitis B complicating pregnancy in second trimester ([facility])      Skin (within normal limits)      GYN   (+) 36 weeks gestation of pregnancy ([facility])       Clinical information reviewed:    Allergies                NPO Detail:  NPO/Void Status  Date of Last Liquid: 25  Time of Last Liquid: 1800  Date of Last Solid: 25  Time of Last Solid: 1800         OB/Gyn Evaluation    Present Pregnancy    Patient is pregnant now.  (+) , previous  section, multiple gestation  (-) hypertensive disorder of pregnancy, gestational DM     Obstetric History    (+)  section              Physical Exam    Airway  Mallampati: II  TM distance: >3 FB  Neck ROM: full  Mouth opening: 3 or more finger widths     Cardiovascular    Dental        Pulmonary    Abdominal            Anesthesia Plan    History of general anesthesia?: no  History of complications of general anesthesia?: unknown/emergency    ASA 3     CSE and general   (GA and CSE discussed with patient via . Patient prefers regional anesthesia.  Increased risk spinal/epidural  hematoma formation discussed with the patient.  Signs and symptoms of spinal/epidural hematoma discussed with the patient and when to alert a healthcare provider. Patient understands and prefers to proceed with regional anesthesia.  Discussed the possibility of conversion to GA in case regional is inadequate for the duration of procedure or requires significant blood/product transfusion/ high EBL during procedure.  Patient in agreement. Denies family complications with GA.  No complications with previous regional (no personal hx of GA).  Patient consents to the use of blood and blood products, however, would prefer to receive blood only if needed.  All questions were answered. )  The patient is not a current smoker.    Anesthetic plan and risks discussed with patient.  Use of blood products discussed with patient who consented to blood products.    Plan discussed with CAA and resident.

## 2025-04-17 NOTE — ANESTHESIA PROCEDURE NOTES
CSE Block    Patient location during procedure: OR  Start time: 4/17/2025 5:50 PM  End time: 4/17/2025 6:06 PM  Reason for block: primary anesthetic, at surgeon's request and labor analgesia}  Staffing  Performed: GORGE   Authorized by: Angelina Hunt MD    Performed by: GORGE Beatty    Preanesthetic Checklist  Completed: patient identified, IV checked, risks and benefits discussed, surgical consent, monitors and equipment checked, pre-op evaluation, timeout performed and sterile techniques followed  Block Timeout  RN/Licensed healthcare professional reads aloud to the Anesthesia provider and entire team: Patient identity, procedure with side and site, patient position, and as applicable the availability of implants/special equipment/special requirements.  Patient on coagulant treatment: no  Timeout performed at: 4/17/2025 5:50 PM    CSE Block  Patient position: sitting  Prep: ChloraPrep  Sterility prep: cap, gloves, hand, mask and drape  Sedation level: no sedation  Patient monitoring: blood pressure, continuous pulse oximetry, EKG and heart rate  Approach: midline  Local numbing: lidocaine 1% to skin and subcutaneous tissues  Vertebral space: lumbar  L4-5  Guidance: landmark technique    Epidural Needle  ANNI technique: saline  Needle type: Tuohy   Needle gauge: 17 G  Needle length: 8.9 cm  Needle insertion depth: 6 cm  Spinal Needle  Needle type: pencil-point   Needle gauge: 25 G  Needle length: 12.7 cm  Free flow CSF: yes  Epidural Catheter  Catheter type: multi-orifice  Catheter size: 19 G  Catheter at skin depth: 10 cm  Catheter securement method: clear occlusive dressing and liquid medical adhesive    Test dose: lidocaine 1.5% with epinephrine 1-to-200,000  Test dose result: no positive test dose            Assessment  Sensory level: T4 bilateral  Block outcome: Allis test negative  Number of attempts: 2  Events: no positive test dose  Procedure assessment: patient tolerated procedure well with no  immediate complications and patient was uncooperative during the procedure  Additional Notes  Difficult sitting for CSE due to language barrier despite translating services in the OR, patient inability to remain still for the procedure.  No paraesthesias noted.   Phenylephrine gtt upon spinal bupivacaine administration.  YEHUDA once supine.   Slight desaturation once supine, patient able to squeeze hands b/l.  Patient refused NC or FM.  Blow by O2 administered.

## 2025-04-17 NOTE — H&P
Obstetrical Admission History and Physical    Assessment/Plan    Ramez Stoddard is a 24 y.o.  at 37.1wga by 28wk US presenting for scheduled rCS in s/o prior term IUFD.      Scheduled rCS  - transverse back down on arrival, pt initially desiring TOLAC. Discussed possibility of ECV vs repeat C/S with classical incision given presentation. Reviewed multiple comorbidities including anemia, thrombocytopenia, possible coagulopathy on prior lab work, increasing concern for elevated risk if failed ECV and urgent CS becomes indicated. Pt amenable to either option, plan to evaluate current labs and re-evaluate safest recommendation.   - Pending platelet count, for regional vs general per anesthesia     Thrombocytopenia, Pancytopenia   - s/p heme consult 3/3, inpatient consult pending given updated labs with worsening fibrinogenemia, persistent pancytopenia  - most recent labs: Hgb 9.3, WBC 3.1, Plts 95. Fibrinogen 196  - s/p IV sucrose x2, s/p short prednisone course 3/2025 (noted on outpatient record)  - thrombocytopenia likely 2/2 splenic sequestration (known splenomegaly) vs liver dysfunction vs ITP.   - T&C x2U pRBCs, 1x FFP, 1x plts, 1x cryo.     Hepatitis B Infection   - started on tenofovir , intermittently missing doses after running out  - HBV DNA 169K () -> 11,000 ()   - RUQ 3/27 demonstrating no signs of portal hypertension     Fetal Well-Being  - last growth extrapolated EFW 3350g (78%), AC 59%  -CEFM, Cat 1 currently    Postpartum Planning  -Feeding: breast/bottle  -PPBC: declines    Seen and d/w Dr. Echo Nugent MD  PGY3, Obstetrics and Gynecology       Subjective   Ramez Stoddard is a 23yo  at 37.1wga by 28wk US presenting for scheduled rCS in s/o prior term IUFD.      Pregnancy c/b  - Thrombocytopenia - divya 40 (), most recent 97 in s/o short steroid course 3/2025 by heme. Suspect 2/2 hepatic/splenic pathology.   - Pancytopenia - WBC 3, Hgb 8.3, Plt 40. S/p IV iron  x2  - Low fibrinogen - 200s early in pregnancy, last 196 on    - HBV - HBeAg positive, hx chronic Hep B s/p ID consult. On tenofovir though has not been taking regularly. Liver US wnl. Last HBV quant 11,000 (). For ID consult postpartum   - Splenomegaly - dx 2024 measuring 16.9cm, concern for platelet sequestration  - hx postpartum depression   - hx C/S x1 - in [place], unknown uterine incision. S/p  of IUFD   - hx IUFD - 39wks c/b placental abruption, normal APLS  - maternal pelvic kidney - L pelvic horseshoe kidney noted inferior to cervix     Obstetrical History   OB History    Para Term  AB Living   3 2 2   1   SAB IAB Ectopic Multiple Live Births      0 1      # Outcome Date GA Lbr Javier/2nd Weight Sex Type Anes PTL Lv   3 Current            2 Term 07/10/24 39w3d / 00:07 3.53 kg  Vag-Spont None  FD      Complications: IUFD at 20 weeks or more of gestation   1 Term      CS-Unspec   EDILBERTO       Past Medical History  Medical History[1]     Past Surgical History   Surgical History[2]    Social History  Social History     Tobacco Use    Smoking status: Never    Smokeless tobacco: Never   Substance Use Topics    Alcohol use: Never     Substance and Sexual Activity   Drug Use Never       Allergies  Patient has no known allergies.     Medications  Prescriptions Prior to Admission[3]    Objective    Last Vitals  Temp Pulse Resp BP MAP O2 Sat   36.6 °C (97.9 °F) 99 18 113/60   99 %     Physical Examination  General: no acute distress  HEENT: normocephalic, atraumatic  Heart: warm and well perfused  Lungs: breathing comfortably on room air  Abdomen: gravid  Extremities: moving all extremities  Neuro: awake and conversant  Psych: appropriate mood and affect    FHT: 135/mod/+accel/-decel  [place]: q81-79hkp  BSUS: transverse back down, head to maternal R    Lab Review  Lab Results   Component Value Date    WBC 3.1 (L) 2025    HGB 9.3 (L) 2025    HCT 31.5 (L) 2025    MCV 83 2025     "PLT 95 (L) 2025      .last       [1]   Past Medical History:  Diagnosis Date    Genetic carrier     SMA uncertain carrier status with two copies of SMN1 and SNP    Hepatitis A antibody positive     Hepatitis B     History of IUFD     History of placental abruption     Pancytopenia     Pelvic kidney     Splenomegaly    [2]   Past Surgical History:  Procedure Laterality Date     SECTION, LOW TRANSVERSE      unknwon uterine scare, surgery in [place] because \"baby too big\"   [3]   Medications Prior to Admission   Medication Sig Dispense Refill Last Dose/Taking    ferrous sulfate tablet Take 1 tablet (325 mg) by mouth once daily with breakfast. 90 tablet 0 Past Week    prenatal vit,calc76-iron-folic (Prenatabs Rx) 29 mg iron- 1 mg tablet Take 1 tablet by mouth once daily. 30 tablet 2 Past Week    tenofovir disoproxil fumarate (Viread) 300 mg tablet Take 1 tablet (300 mg) by mouth once daily. 30 tablet 11 Past Week    tenofovir disoproxil fumarate (Viread) 300 mg tablet Take 1 tablet (300 mg) by mouth once daily. 30 tablet 11      "

## 2025-04-17 NOTE — L&D DELIVERY NOTE
Birth Operative Report    Patient Name: Ramez Stoddard  : 2000  MRN: [id number]  Age: 24 y.o.    /Para:   Gestational Age: 37w1d    Date of Surgery: 2025    Operating Room Location: [place] [address]    Pre-op Diagnosis:  Intrauterine Pregnancy at 37w1d  H/o C/S x1  Hx of   Transverse presentation  Pancytopenia  H/o IUFD  Maternal hep B  Maternal pelvic kidney  Splenomegaly  Persian speaking only  Uncertain SMA carrier status  Low fibrinogen    Post-op Diagnosis:  Same    Procedure:   Repeat Low Transverse  Section    Surgery Category at [facility] Time: Confirmed Scheduled, Non-urgent    Surgeon:  Lisa Mcgarry MD    Resident/Fellow/Other Assistant:   BELINDA Bowser PGY 4    Anesthesia:  Type: CSE, general     Anesthesiologist: Angelina Hunt MD  C-AA: GORGE Beatty    Surgical Staff:  Circulator: Wilner Bee RN  Relief Circulator: Chiqui Craven RN  Scrub Person: Gianna Pathak     Preoperative Antibiotics: Ancef 2 g    Indication for Procedure:   24 y.o.  at 37w1d  with h/o C/S x1, transverse presentation, pancytopenia, h/o IUFD presented for rCS in s/o malpresentation    Informed Consent:  The risks, benefits, complications, and alternatives were discussed with the patient. The patient understood that the risks of  section include but are not limited to infection, bleeding, injury to nearby structures or organs, possible need for transfusion, and potential need for more surgery. The patient stated understanding and desired to proceed. All questions were answered. The site of surgery was properly noted and marked. The patient's identity was confirmed, and the procedure verified as a  delivery. A Time Out was held and the above information confirmed.     Findings:   Normal appearing gravid uterus, fallopian tubes, and ovaries. Minimal scar tissue. Amniotic fluid Clear, Female infant in Vertex     presentation, APGARS 9 , 9 .  Birth  Weight 3.24 kg. Bleeding was consistent with decreased fibrinogen for pregnancy, though with minimal blood loss and no need for intra-op transfusion.    Description of Procedure:   Patient was taken to the OR where regional anesthesia was found to be adequate.  She was then placed in the dorsal supine position with a left lateral tilt. A locke catheter was placed, SCDs were applied, and a vaginal prep was performed. A pre-procedure time out was performed. The patient was given a prophylactic dose of IV antibiotics. She was then prepped and draped in the usual sterile fashion.     A Pfannenstiel skin incision was made with the scalpel through the skin and subcutaneous fat to the underlying fascial layer. The fascia was incised in the midline with the scalpel and the incision was extended bilaterally bluntly. The muscles were divided in the midline, the peritoneum was then identified and entered bluntly and incision extended superiorly and inferiorly taking care to avoid underlying viscera.  The bladder blade was inserted.       The uterus was rotated toward maternal left. The uterus was derotated and the fetus palpated in cephalic presentation. A low transverse uterine incision was made with the scalpel, the uterine cavity was entered, and the hysterotomy was extended cephalocaudally by stretching. The infant was delivered atraumatically, the cord was clamped and cut and infant was handed off to awaiting nursing.  A cord blood sample was collected. The placenta was then expressed. The uterus was cleared of all clot and debris. The uterine incision was repaired using a running locked stitch of 0-vicryl in two layers. Adequate hemostasis was noted.    The hysterotomy was again evaluated and found to be hemostatic, the underside of the fascia and bladder and the rectus muscles were also found to be hemostatic. The fascia was closed using a running stitch of 0-PDS.  The subcutaneous layer was irrigated, small bleeders  were cauterized. The skin was closed with 4-0 monocryl.      Lisa Mcgarry was present for garnica portions of the procedure.    Complications:           Anesthesia Record               Intraprocedure I/O Totals          Intake    LR bolus 800.00 mL    Tranexamic Acid 0.00 mL    The total shown is the total volume documented since Anesthesia Start was filed.    Oxytocin Drip 0.00 mL    The total shown is the total volume documented since Anesthesia Start was filed.    Phenylephrine Drip 0.00 mL    The total shown is the total volume documented since Anesthesia Start was filed.    Total Intake 800 mL       Output    Urine 175 mL    Total Output 175 mL       Net    Net Volume 625 mL            Blood products:    Blood Product Administration History       None            Uterotonics/Hemostatic Agent: IV Pitocin 30 units    Specimen:   Placenta  Delivered: 2025  6:54 PM  Appearance: Intact  Removal: Expressed    Disposition: pathology  lab    Sponge/Instrument/Needle Counts: The sponge, lap and needle counts were correct.    Patient Disposition: Patient recovering on labor and delivery in stable condition.    Additional Procedures:  None    Task Performed by: RN or PA Surgical Assistant: N/A      Davin Stoddard [[id number]]      Labor Events    Sac identifier: Sac 1  Rupture date/time: 2025 1850  Rupture type: Artificial  Fluid color: Clear  Fluid odor: None  Labor type:  Without Labor  Labor allowed to proceed with plans for an attempted vaginal birth?: No  Complications: None       Placenta    Placenta delivery date/time: 2025 18:54  Placenta removal: Expressed  Placenta appearance: Intact  Placenta disposition: pathology, lab       Cord    Vessels: 3 vessels  Complications: None  Delayed cord clamping?: Yes  Cord clamped date/time: 2025 18:53:00  Cord blood disposition: Refrigerator, Discarded  Gases sent?: No  Stem cell collection (by provider): No       Lacerations    Episiotomy:  None  Perineal laceration: None  Other lacerations?: No  Repair suture: None       Anesthesia    Method: Combined spinal-epidural       Operative Delivery    Forceps attempted?: No  Vacuum extractor attempted?: No       Shoulder Dystocia    Shoulder dystocia present?: No       [place] Delivery    Birth date/time: 2025 18:51:00  Delivery type: , Low Transverse   categorization: repeat   priority: scheduled  Indications for : Breech, Repeat   Complications: None       Resuscitation    Method: None       Apgars    Living status: Living  Apgar Component Scores:  1 min.:  5 min.:  10 min.:  15 min.:  20 min.:    Skin color:  1  1       Heart rate:  2  2       Reflex irritability:  2  2       Muscle tone:  2  2       Respiratory effort:  2  2       Total:  9  9       Apgars assigned by: ZEE MCFARLANE RN       Delivery Providers    Delivering clinician: Lisa Mcgarry MD   Provider Role    Wilner Bee, RN Delivery Nurse    Vanessa Mcfarlane, RN Nursery Nurse    Andra Bowser MD Resident    Chiqui Craven RN Delivery Nurse    Dillan Christianson RN Nursery Nurse

## 2025-04-17 NOTE — NURSING NOTE
"RN at BS in attempt to ask the remaining admission questions /c help of Marcella . Pt states via  that she does not want to answer any more questions and told RN maura \"leave her alone.\" RN communicated via  that should she change her mind to let us know.  "

## 2025-04-17 NOTE — CONSULTS
Reason For Consult  Pre-op hematologic optimization for planned  in a patient with chronic pancytopenia, thrombocytopenia, and low fibrinogen.    History Of Present Illness  24F,  at 37 weeks, with history of pancytopenia, chronic hepatitis B, splenomegaly, and prior IUFD, scheduled for  delivery on  for breech presentation. Labs show Hgb 9.3 g/dL, Plt 95 x10³/?L, fibrinogen 196 mg/dL (goal >300 per OB). Patient received IV iron (2/3 doses) and has tolerated pregnancy without bleeding or transfusion. No current signs of coagulopathy or bleeding. Outpatient hematology attributed cytopenias to hypersplenism, iron deficiency, and chronic liver disease. No evidence of ITP, hemolysis, or clonal process.  The pt reported no active bleeding. No hx of bleeding. No family hx of bleeding disorder. No complications with delivery her previous baby and he is 4 years old now. Plan discussed in details with pt and her family. Note: patient does not know if the 5 yo has congenital HepB - never tested.    Timeline  Pancytopenia noted throughout pregnancy  Fibrinogen: 214 ? now 196 (goal >300 for )  Platelets: 40 (early pregnancy) ? now 95 after iron/steroid trial  Iron deficiency: Treated with IV iron, ferritin improved to 42   planned for        Hematology Workup Summary   CBC: Pancytopenia - WBC 3.1, Hgb 9.3 (? from 7.3), Plt 95 (? from 40), RDW 21.6%  Iron studies: Ferritin 42 (?), Iron 58, UIBC >450 ? functional iron deficiency  Reticulocytes: Retic 3.4%, Absolute 0.129, CHr 27 pg, IRF 25% ? marrow responsive  Coags: INR 1.1, aPTT normal, Fibrinogen 196 mg/dL (?) ? needs correction pre-op  Peripheral smear: Mild polychromasia, few ovalocytes/teardrops, no schistocytes/blasts  LG: Negative  Flow cytometry: No clonal B/T cells, no malignancy  Autoimmune testing: KELLI + (speckled, Scl-70), antiphospholipid panel negative  VWD panel: VWF activity/multimers normal  B12, folate,  copper: Normal  Hb electrophoresis: No beta thalassemia; alpha trait cannot be ruled out  Type & Screen: B+, antibody screen negative  Imaging: Splenomegaly on liver/spleen US (supports hypersplenism)  Smear reviewed today showed no concerning findings except of hypochromia.         Past Medical History  She has a past medical history of Genetic carrier, Hepatitis A antibody positive, Hepatitis B, History of IUFD, History of placental abruption, Pancytopenia, Pelvic kidney, and Splenomegaly.    Surgical History  She has a past surgical history that includes  section, low transverse.     Social History  She reports that she has never smoked. She has never used smokeless tobacco. She reports that she does not drink alcohol and does not use drugs.    Family History  Family History[1]     Allergies  Patient has no known allergies.    Review of Systems  As above     Physical Exam  General: no acute distress  HEENT: normocephalic, atraumatic  Heart: warm and well perfused  Lungs: breathing comfortably on room air  Abdomen: gravid  Extremities: moving all extremities  Neuro: awake and conversant  Psych: appropriate mood and affect     Last Recorded Vitals  Blood pressure 113/60, pulse 99, temperature 36.6 °C (97.9 °F), resp. rate 18, weight 77.4 kg (170 lb 10.2 oz), SpO2 99%.    Relevant Results  No results found for this or any previous visit (from the past 24 hours).      Assessment/Plan     Impression  Patient with chronic pancytopenia likely 2/2 hypersplenism, chronic liver disease, and iron deficiency. She now requires preoperative hematologic support for  delivery with a low fibrinogen and borderline platelets, but no evidence of active DIC, hemolysis, or marrow failure.    Patient with chronic pancytopenia likely due to hypersplenism and chronic liver disease, with labs showing WBC 3.1, Hgb 9.3 (improved from 7.3), and Plt 95 (previously as low as 40). Iron studies reveal functional iron deficiency  (ferritin 42, iron 58, UIBC >450) with reticulocytosis (Retic 3.4%, IRF 25%) indicating marrow responsiveness. Coagulation profile shows stable INR (1.1) but low fibrinogen (196 mg/dL), requiring pre-op correction. Peripheral smear shows mild polychromasia without schistocytes or blasts. LG is negative, flow cytometry reveals no clonal process, and KELLI is positive with Scl-70, though clinical significance unclear. B12, folate, copper are normal. VWD panel and antiphospholipid antibodies are unremarkable. Type and screen is B+ with negative antibody screen. Abdominal ultrasound shows splenomegaly without cirrhosis, supporting hypersplenism as the primary  of cytopenias.      #1. Pre-op Fibrinogen <300 mg/dL (Current: 196)  Give Cryoprecipitate 2 pools (10 units) pre-op  Recheck fibrinogen 30-60 min post-transfusion  Repeat cryo if <250 or continued bleeding  Thrombin time and Coag daily in the AM    #2. Platelet count 95 x10³/?L  Give one 1 unit of Irradiated Plts before OR  Have platelets crossmatched and on hold in OR    #3. Anemia (Hgb 9.3)  Transfusion as per primary team  Type and screen already done -- monitor for post-op bleeding  Consider 1U PRBC intra/post-op if Hgb drops <7.5 + symptomatic    #4. Additional Orders  Daily CBC, coags, fibrinogen pre- and post-op  OB team to avoid NSAIDs, use uterotonics cautiously  Hep B precautions in place (peds aware)    Thank you for this consult, we will continue to follow. Patient seen and discussed with attending physician, Dr. Castellanos who agrees with the above.      Esteban Roach MD  Hematology-Oncology Fellow, PGY4  Hematology Consult Pager: [phone]  Oncology Consult Pager: [phone]     Attending: agree with plan and theapeutic recommendations.    ZEE Castellanos         [1] No family history on file.

## 2025-04-17 NOTE — CARE PLAN
The patient's goals for the shift include successful TOLAC    The clinical goals for the shift include healthy mom et baby, successful TOLAC      Problem: Vaginal Birth or  Section  Goal: Fetal and maternal status remain reassuring during the birth process  Outcome: Met  Goal: Tolerate CRB for IOL placement maintenance until dislodgement/removal 12hrs after placement  Outcome: Met  Goal: Prevention of malpresentation/labor dystocia through delivery  Outcome: Met  Goal: Demonstrates labor coping techniques through delivery  Outcome: Met  Goal: Minimal s/sx of HDP and BP<160/110  Outcome: Met  Goal: No s/sx of infection through recovery  Outcome: Met  Goal: No s/sx of hemorrhage through recovery  Outcome: Met     Problem: Pain - Adult  Goal: Verbalizes/displays adequate comfort level or baseline comfort level  Outcome: Progressing     Problem: Safety - Adult  Goal: Free from fall injury  Outcome: Progressing     Problem: Discharge Planning  Goal: Discharge to home or other facility with appropriate resources  Outcome: Progressing     Problem: Chronic Conditions and Co-morbidities  Goal: Patient's chronic conditions and co-morbidity symptoms are monitored and maintained or improved  Outcome: Progressing

## 2025-04-17 NOTE — SIGNIFICANT EVENT
Updated plan of care     To room to review pt labs and plan of care. Overall labs stable, see below. Reviewed overall safe to consider ECV vs classical C/S. Pt expressed concern for fetal distress, would like to proceed with classical C/S. Reviewed r/b/a of classical, reviewed recommendation for repeat C/S for all future pregnancies. Pt expressed understanding, was able to verbalize understanding of above. Plan for repeat classical C/S. Crossed 4x pRBCs, 4x plts, 4x FFP, 4x cryo. Plan for 2u of all products in room. Anesthesia aware.     Labs in chart were reviewed.  CBC   Recent Labs     04/17/25  1258 04/14/25  1050   WBC 2.6* 3.1*   HGB 8.7* 9.3*   HCT 28.1* 31.5*   PLT 97* 95*     Coag   Recent Labs     04/17/25  1258 04/14/25  1050   APTT 26  --    PROTIME 11.4 11.6   INR 1.0 1.1     Fibrinogen  Recent Labs     04/17/25  1258 04/14/25  1050   FIBRINOGEN 226 196*

## 2025-04-17 NOTE — PROGRESS NOTES
Hematology Care Plan – 25    Reason:   Pre-op hematologic recommendations for  section today in a 24F  with chronic pancytopenia, thrombocytopenia (Plt ~95), iron deficiency anemia (Hgb ~9.3), and low fibrinogen (196 mg/dL on ). She has known splenomegaly and chronic hepatitis B, followed by Dr. Booker in outpatient hematology.    Workup Summary:  Chronic pancytopenia attributed to hypersplenism and iron deficiency. Extensive hematologic evaluation revealed no clonal disorder (flow cytometry negative), no hemolysis (LG negative), normal B12/folate/copper, and normal VWF studies. Peripheral smear showed no blasts or schistocytes. KELLI positive with unclear clinical significance.    Perioperative Recommendations (pending today’s labs):     • Transfuse 1 unit irradiated, leukoreduced platelets + 2 pools (10 units) of cryoprecipitate on call to OR     • Goal: Platelet >75, fibrinogen >300 per OB     • If fibrinogen >300 mg/dL today, can consider holding cryo and reassess post-op     • Monitor CBC and fibrinogen daily pre- and post-op     • Platelets should be irradiated and leukoreduced     • Ensure platelets and cryo are crossmatched and available in OR     • Transfuse PRBCs if Hgb <7.5 g/dL with symptoms or active bleeding     • Repeat CBC and fibrinogen 4–6 hrs post-op    Plan discussed with attending physician Dr. Castellanos.    A full consult note to follow today.

## 2025-04-18 LAB
BLOOD EXPIRATION DATE: NORMAL
DISPENSE STATUS: NORMAL
ERYTHROCYTE [DISTWIDTH] IN BLOOD BY AUTOMATED COUNT: 20.3 % (ref 11.5–14.5)
FIBRINOGEN PPP-MCNC: 166 MG/DL (ref 200–400)
HCT VFR BLD AUTO: 20.4 % (ref 36–46)
HGB BLD-MCNC: 6.2 G/DL (ref 12–16)
MCH RBC QN AUTO: 24.9 PG (ref 26–34)
MCHC RBC AUTO-ENTMCNC: 30.4 G/DL (ref 32–36)
MCV RBC AUTO: 82 FL (ref 80–100)
NRBC BLD-RTO: 0 /100 WBCS (ref 0–0)
PLATELET # BLD AUTO: 100 X10*3/UL (ref 150–450)
PRODUCT BLOOD TYPE: 5100
PRODUCT BLOOD TYPE: 7300
PRODUCT BLOOD TYPE: 8400
PRODUCT BLOOD TYPE: 8400
PRODUCT CODE: NORMAL
RBC # BLD AUTO: 2.49 X10*6/UL (ref 4–5.2)
THROMBIN TIME: 17.7 SECONDS (ref 10.3–16.6)
UNIT ABO: NORMAL
UNIT NUMBER: NORMAL
UNIT RH: NORMAL
UNIT VOLUME: 197
UNIT VOLUME: 205
UNIT VOLUME: 220
UNIT VOLUME: 222
UNIT VOLUME: 228
UNIT VOLUME: 283
UNIT VOLUME: 291
UNIT VOLUME: 350
UNIT VOLUME: 75
UNIT VOLUME: 88
UNIT VOLUME: 89
UNIT VOLUME: 99
WBC # BLD AUTO: 3.2 X10*3/UL (ref 4.4–11.3)
XM INTEP: NORMAL

## 2025-04-18 PROCEDURE — 1210000001 HC SEMI-PRIVATE ROOM DAILY

## 2025-04-18 PROCEDURE — 36415 COLL VENOUS BLD VENIPUNCTURE: CPT | Performed by: STUDENT IN AN ORGANIZED HEALTH CARE EDUCATION/TRAINING PROGRAM

## 2025-04-18 PROCEDURE — 85027 COMPLETE CBC AUTOMATED: CPT | Performed by: STUDENT IN AN ORGANIZED HEALTH CARE EDUCATION/TRAINING PROGRAM

## 2025-04-18 PROCEDURE — 2500000004 HC RX 250 GENERAL PHARMACY W/ HCPCS (ALT 636 FOR OP/ED): Mod: JZ,SE | Performed by: NURSE PRACTITIONER

## 2025-04-18 PROCEDURE — 2500000004 HC RX 250 GENERAL PHARMACY W/ HCPCS (ALT 636 FOR OP/ED): Mod: JZ,SE | Performed by: STUDENT IN AN ORGANIZED HEALTH CARE EDUCATION/TRAINING PROGRAM

## 2025-04-18 PROCEDURE — 85384 FIBRINOGEN ACTIVITY: CPT | Performed by: STUDENT IN AN ORGANIZED HEALTH CARE EDUCATION/TRAINING PROGRAM

## 2025-04-18 PROCEDURE — 85385 FIBRINOGEN ANTIGEN: CPT | Performed by: STUDENT IN AN ORGANIZED HEALTH CARE EDUCATION/TRAINING PROGRAM

## 2025-04-18 PROCEDURE — 2500000001 HC RX 250 WO HCPCS SELF ADMINISTERED DRUGS (ALT 637 FOR MEDICARE OP): Mod: SE | Performed by: STUDENT IN AN ORGANIZED HEALTH CARE EDUCATION/TRAINING PROGRAM

## 2025-04-18 PROCEDURE — 85670 THROMBIN TIME PLASMA: CPT | Performed by: STUDENT IN AN ORGANIZED HEALTH CARE EDUCATION/TRAINING PROGRAM

## 2025-04-18 RX ORDER — METOCLOPRAMIDE HYDROCHLORIDE 5 MG/ML
10 INJECTION INTRAMUSCULAR; INTRAVENOUS EVERY 6 HOURS PRN
Status: DISCONTINUED | OUTPATIENT
Start: 2025-04-18 | End: 2025-04-20 | Stop reason: HOSPADM

## 2025-04-18 RX ORDER — SODIUM CHLORIDE, SODIUM LACTATE, POTASSIUM CHLORIDE, CALCIUM CHLORIDE 600; 310; 30; 20 MG/100ML; MG/100ML; MG/100ML; MG/100ML
100 INJECTION, SOLUTION INTRAVENOUS CONTINUOUS
Status: ACTIVE | OUTPATIENT
Start: 2025-04-18 | End: 2025-04-19

## 2025-04-18 RX ORDER — SCOPOLAMINE 1 MG/3D
1 PATCH, EXTENDED RELEASE TRANSDERMAL
Status: DISCONTINUED | OUTPATIENT
Start: 2025-04-18 | End: 2025-04-20 | Stop reason: HOSPADM

## 2025-04-18 RX ADMIN — METOCLOPRAMIDE 10 MG: 5 INJECTION, SOLUTION INTRAMUSCULAR; INTRAVENOUS at 02:36

## 2025-04-18 RX ADMIN — SODIUM CHLORIDE, SODIUM LACTATE, POTASSIUM CHLORIDE, AND CALCIUM CHLORIDE 500 ML: .6; .31; .03; .02 INJECTION, SOLUTION INTRAVENOUS at 05:29

## 2025-04-18 RX ADMIN — KETOROLAC TROMETHAMINE 30 MG: 30 INJECTION, SOLUTION INTRAMUSCULAR; INTRAVENOUS at 07:57

## 2025-04-18 RX ADMIN — KETOROLAC TROMETHAMINE 30 MG: 30 INJECTION, SOLUTION INTRAMUSCULAR; INTRAVENOUS at 14:43

## 2025-04-18 RX ADMIN — IRON SUCROSE 200 MG: 20 INJECTION, SOLUTION INTRAVENOUS at 21:17

## 2025-04-18 RX ADMIN — ACETAMINOPHEN 975 MG: 325 TABLET, FILM COATED ORAL at 21:10

## 2025-04-18 RX ADMIN — ONDANSETRON 4 MG: 2 INJECTION INTRAMUSCULAR; INTRAVENOUS at 00:10

## 2025-04-18 RX ADMIN — KETOROLAC TROMETHAMINE 30 MG: 30 INJECTION, SOLUTION INTRAMUSCULAR; INTRAVENOUS at 01:54

## 2025-04-18 SDOH — SOCIAL STABILITY: SOCIAL INSECURITY: PHYSICAL ABUSE: UNABLE TO ASSESS

## 2025-04-18 SDOH — HEALTH STABILITY: MENTAL HEALTH: REASON FOR INCOMPLETE PSYCHIATRIC SCREENING: PATIENT UNWILLING TO RESPOND

## 2025-04-18 SDOH — SOCIAL STABILITY: SOCIAL INSECURITY: VERBAL ABUSE: UNABLE TO ASSESS

## 2025-04-18 SDOH — SOCIAL STABILITY: SOCIAL INSECURITY: HAVE YOU HAD THOUGHTS OF HARMING ANYONE ELSE?: NO

## 2025-04-18 SDOH — HEALTH STABILITY: MENTAL HEALTH: WERE YOU ABLE TO COMPLETE ALL THE BEHAVIORAL HEALTH SCREENINGS?: NO

## 2025-04-18 ASSESSMENT — PAIN SCALES - GENERAL
PAINLEVEL_OUTOF10: 3
PAINLEVEL_OUTOF10: 0 - NO PAIN
PAINLEVEL_OUTOF10: 3
PAINLEVEL_OUTOF10: 0 - NO PAIN
PAINLEVEL_OUTOF10: 0 - NO PAIN
PAINLEVEL_OUTOF10: 5 - MODERATE PAIN

## 2025-04-18 ASSESSMENT — PATIENT HEALTH QUESTIONNAIRE - PHQ9
2. FEELING DOWN, DEPRESSED OR HOPELESS: NOT AT ALL
1. LITTLE INTEREST OR PLEASURE IN DOING THINGS: NOT AT ALL
SUM OF ALL RESPONSES TO PHQ9 QUESTIONS 1 & 2: 0

## 2025-04-18 ASSESSMENT — PAIN DESCRIPTION - DESCRIPTORS: DESCRIPTORS: DISCOMFORT

## 2025-04-18 NOTE — SIGNIFICANT EVENT
To the bedside to discuss labs via Outline .     Lab Results   Component Value Date    WBC 3.7 (L) 04/17/2025    HGB 7.2 (L) 04/17/2025    HCT 24.7 (L) 04/17/2025     (L) 04/17/2025     Lab Results   Component Value Date    APTT 33 04/17/2025    PROTIME 12.4 04/17/2025    INR 1.1 04/17/2025     Lab Results   Component Value Date    FIBRINOGEN 178 (L) 04/17/2025      Vitals:    04/17/25 2313   BP: 108/59   Pulse: 92   Resp:    Temp:    SpO2:       Recommended transfusion of pRBC and cyro now. Pt declined transfusion. Explained risks, benefits of transfusion. She said she possibly would be agreeable to transfusion tomorrow if labs were worse. She is asx. Explained risk of hemorrhage and possible need for emergency transfusion. Pt expressed understanding and said she would be agreeable to transfusion in an emergency.    Discussed with Dr. Steven Bowser MD, PGY-4

## 2025-04-18 NOTE — ANESTHESIA POSTPROCEDURE EVALUATION
Patient: Ramez Stoddard    Procedure Summary       Date: 25 Room / Location: [facility] OB 04 / Virtual [facility] 2 OB    Anesthesia Start:  Anesthesia Stop:     Procedure:  DELIVERY Diagnosis:       Previous  section      History of IUFD      Breech presentation, fetus 1 of multiple gestation ([facility])      (Previous  section [Z98.891])      (History of IUFD [Z87.59])      (Breech presentation, fetus 1 of multiple gestation ([facility]) [O32.1XX1])    Surgeons: Dmitriy Dodge MD Responsible Provider: Angelina Hunt MD    Anesthesia Type: CSE, general ASA Status: 3            Anesthesia Type: CSE, general    Vitals Value Taken Time   /64 25 19:59   Temp 36.5 25 20:00   Pulse 93 25 19:59   Resp 18 25 20:00   SpO2 99 % 25 19:46       Anesthesia Post Evaluation    Patient location during evaluation: bedside  Patient participation: complete - patient participated  Level of consciousness: awake and alert  Pain management: adequate  Airway patency: patent  Cardiovascular status: acceptable  Respiratory status: acceptable  Hydration status: stable  Postoperative Nausea and Vomiting: none        No notable events documented.

## 2025-04-18 NOTE — SIGNIFICANT EVENT
Check in    25 yo now  POD#0 from [facility] in s/o pancytopenia.     EBL from delivery 534. Most recent pad weighing 400g over this last hour. Will get repeat labs and continue to monitor. Fundus firm. No blood expressed on fundal.        2025     9:13 PM 2025     9:28 PM 2025     9:43 PM 2025     9:44 PM 2025     9:58 PM 2025    10:13 PM 2025    10:28 PM   Vitals   Systolic 100 107 106  96 100 101   Diastolic 69 65 65  58 60 56   Heart Rate 91 103 93  87 96 90   Temp    36.4 °C (97.5 °F)      Resp 16 16 16           Andra Bowser MD, PGY-4

## 2025-04-18 NOTE — CARE PLAN
The patient's goals for the shift include     The clinical goals for the shift include VSS, lochia scant    VS/assessment stable. Lochia scant, fundus firm. Pain well controlled with scheduled pain medication and rest. Decreased UO, bolus running now. Passing gas. Breastfeeding and bonding with infant.     Problem: Pain - Adult  Goal: Verbalizes/displays adequate comfort level or baseline comfort level  Outcome: Progressing     Problem: Safety - Adult  Goal: Free from fall injury  Outcome: Progressing     Problem: Discharge Planning  Goal: Discharge to home or other facility with appropriate resources  Outcome: Progressing     Problem: Chronic Conditions and Co-morbidities  Goal: Patient's chronic conditions and co-morbidity symptoms are monitored and maintained or improved  Outcome: Progressing     Problem: Postpartum  Goal: Experiences normal postpartum course  Outcome: Progressing  Goal: Appropriate maternal -  bonding  Outcome: Progressing  Goal: Establish and maintain infant feeding pattern for adequate nutrition  Outcome: Progressing  Goal: Incisions, wounds, or drain sites healing without S/S of infection  Outcome: Progressing  Goal: No s/sx infection  Outcome: Progressing  Goal: No s/sx of hemorrhage  Outcome: Progressing  Goal: Minimal s/sx of HDP and BP<160/110  Outcome: Progressing     Problem:  Recovery Care  Goal: Verbalizes understanding of post-op instructions  Outcome: Progressing  Goal: Manages discomfort  Outcome: Progressing  Goal: Dressing intact until removed with any drainage marked  Outcome: Progressing  Goal: Patient vital signs are stable  Outcome: Progressing  Goal: Fundus firm at midline  Outcome: Progressing     Problem: Anemia in pregnancy  Goal: Tolerates treatment for anemia  Outcome: Progressing

## 2025-04-18 NOTE — DISCHARGE INSTRUCTIONS

## 2025-04-18 NOTE — PROGRESS NOTES
Social Work Note    Patient: Ramez Stoddard, 25yo,     SW received referral to met with Ms Stoddard for assessment due to financial concerns. Ms Stoddard speaks Danish and Martii  used for assessment.   Ms Stoddard reports she has good support. Family and friends present throughout admission, and supportive/appropriate. Ms Stoddard reports she has a crib for  girl Promise and SW reviewed safe sleep. Ms Stoddrad states she does not yet have a car seat and will purchase one or borrow one before discharge. SW reviewed safety and legal risks or travel without one.   Ms Stoddard interested in WIC and  items assistance. SW provided resources list (in English for English speaking agencies) and Ms Stoddard to have one of her support people assist her with WIC appt and resources next week.   Ms Stoddard denies IPV/safety concerns and denies signs and symptoms of depression. SW reviewed postpartum depression signs, symptoms.  No additional needs or concerns noted. Ms Stoddard and  clear from SW perspective.     JAYLIN Ernandez

## 2025-04-18 NOTE — PROGRESS NOTES
Postpartum Progress Note    HELENE was used to communicate with patient. Interpretor ID [id number]    Assessment/Plan   Ramez Stoddard is a 24 y.o., , who delivered at 37w1d gestation    Now PPD#1 s/p , Low Transverse on 2025  - Continue routine postpartum care  - Pain well controlled on po medications  - DVT risk score DVT Score (IF A SCORE IS NOT CALCULATING, MUST SELECT A BMI TO COMPLETE): 5 , ppx with SCDs, ambulation, and lovenox  - RH positive, rhogam not indicated  - Hgb:   Results from last 7 days   Lab Units 25  0543 25  2243 25  1258   HEMOGLOBIN g/dL 6.2* 7.2* 8.7*        Thrombocytopenia, Pancytopenia  - s/p heme consult ,  updated labs with worsening fibrinogenemia, persistent pancytopenia. Most recent labs: hgb 6.2, wbc 3.2, plt 100, and fibrinogen 166  - s/p IV sucrose x2, s/p short prednisone course 3/2025 (noted on outpatient record)  - thrombocytopenia likely 2/2 splenic sequestration (known splenomegaly)   -  -The patient was counseled extensively and strongly advised need for blood products.   I have reviewed the risks/benefits of refusing these products. Patient adamantly refused all blood products at this time. Patient was open to proceeding with IV iron  -IV iron ordered  -Avoid NSAIDs  -continue to monitor for s/s of bleeding      Hepatitis B Infection   - started on tenofovir , intermittently missing doses after running out  - HBV DNA 169K () -> 11,000 ()   - RUQ 3/27 demonstrating no signs of portal hypertension or cirrhosis  -Peds team aware    Maternal Well-Being  - Vitals stable  -Mood appropriate  - All questions and concerns address     Feeding  - Breastfeeding/pumping encouraged  - Lactation consult prn    Contraception  - Declines  - Education provided    Dispo  - Anticipate d/c on PPD #3 if meeting all postpartum milestones  - Follow-up in 1-2wks for incision check  - Follow-up in 4-6wks with primary SHAMAR Nielsen  REHANA Elam     Assessment & Plan  Labor and delivery indication for care or intervention ([facility])    History of IUFD    Previous  section    Breech presentation, no version ([facility])    Pregnancy Problems (from 25 to present)       Problem Noted Diagnosed Resolved    Labor and delivery indication for care or intervention ([facility]) 2025 by Oumou Nugent MD  No    Priority:  Medium       Flu 2025 by REHANA Payne  No    Priority:  Medium       Overview Signed 2025 10:01 AM by REHANA Payne   - Dx by swab at urgent care, records unavailable for review  - VS WNL, afebrile in triage   - No signs of systemic infection at this time  - Continue tamiflu (started last week, unknown start date)         Maternal iron deficiency anemia affecting pregnancy in third trimester, antepartum 3/18/2025 by Rocco Meléndez MD  No    Priority:  Medium       Overview Addendum 2025  5:01 PM by Lisa Mcgarry MD   Hgb of 8   [x] heme consult   [x] IV iron infusions completed         Fibrinogen decreased ([facility]) 3/12/2025 by Lisa Mcgarry MD  No    Priority:  Medium       Overview Signed 3/12/2025  4:40 PM by Lisa Mcgarry MD   Fibrinogen on 3/4 of 214   [] give FFP & cryo if encounter bleeding during delivery          Genetic carrier status 3/5/2025 by Lisa Mcgarry MD  No    Priority:  Medium       Overview Signed 3/5/2025 10:02 AM by Lisa Mcgarry MD   Pt is an uncertain SMA carrier. Carrier testing showing 2 copies of SMA + SNP. No partner testing.   [] genetic counseling   [] partner testing         Hepatitis A antibody positive 3/5/2025 by Lisa Mcgarry MD  No    Priority:  Medium       Overview Addendum 3/25/2025 10:13 AM by Lisa Mcgarry MD   Hep A AB total is positive   [x] follow up differentiation - IgM negative         Frisian  needed 3/4/2025 by Lisa Mcgarry MD  No    Priority:  Medium       History of IUFD 2/15/2025 by Lisa Mcgarry MD   No    Priority:  Medium       Overview Addendum 3/26/2025  4:42 PM by Lisa Mcgarry MD   2024 - IUFD at 39.3wks, delivery via , complicated by placental abruption   [x] weekly NST at 32 wks   [x] genetic screening - rr cfDNA  [x] APLS drawn and wnl on           36 weeks gestation of pregnancy ([facility]) 2/15/2025 by Lisa Mcgarry MD  No    Priority:  Medium       Overview Addendum 2025 11:13 AM by Lisa Mcgarry MD   Desired provider in labor: [] CNM  [x] Physician  [x] Initial BMI: unable to be calculated   [x] Prenatal Labs: Hep B positive, pancytopenia, otherwise WNL  [x] Rh status: positive    [x] Genetic Screening: rr cfDNA  [x] Pregnancy dated by: bedside US on      [x] Anatomy US: incomplete on  though no malformations  [x] 1hr GCT at 24-28wks: 64  [x] Fetal Surveillance (if indicated): weekly at 32wk for IUFD  [x] Tdap (27-36wks): 25     [x] Breastfeeding: planning breast feeding  [x] Postpartum Birth control method: declines  [x] GBS at 36 - 37 wks: negative on   [x] 39 weeks discussion of IOL vs. Expectant management:  37wks  [x] Mode of delivery ( anticipated ): desires TOLAC           Pancytopenia 2025 by JACQUES Camarena-CNP  No    Priority:  Medium       Overview Addendum 4/3/2025 11:10 AM by Lisa Mcgarry MD   Diagnosed on  in triage visit with WBC of 3.0, Hgb of 8.3, and Plt of 40   [x] Hematology appointment on 3/3 - iron infusions planned    Work Up:  CBC with diff, reticulocytes consistent with ongoing pancytopenia.   LDH/uric acid, haptoglobin, direct bili overall non concerning for hemyolysis.   Coags, fibrinogen unremarkable.   Peripheral smear without evidence of malaria  Copper level WNL  Myraid cfDNA - risk reducing.    [x] Liver and spleen ultrasound completed showing splenomegaly, no liver pathology          History of postpartum depression, currently pregnant ([facility]) 2024 by Lizbet Wang APRN-CNP  No    Priority:  Medium       History of   2024 by JACQUES Camarena-CNP  No    Priority:  Medium       Thrombocytopenia affecting pregnancy, antepartum ([facility]) 7/10/2024 by ASAD HinesCRNA  No    Priority:  Medium       Overview Addendum 3/25/2025 10:39 AM by Lisa Mcgarry MD   Plt in triage on  of 40, back to baseline with Plt of 67 on 3/21  Hx of thrombocytopenia outside of pregnancy  [x] hematology consult - etiology attributed to liver and spleen pathology  [] If need to increased plt, given plt transfusion         History of  delivery, currently pregnant ([facility]) 2023 by JACQUES Camarena-CNP  No    Priority:  Medium       Overview Addendum 2025  3:48 PM by Lisa Mcgarry MD   Performed in [facility], unknown uterine incision   S/p  in setting of IUFD in   MFMU score of 94% - desires TOLAC         Hepatitis B complicating pregnancy in second trimester ([facility]) 2023 by JACQUES Camarena-CNP  No    Priority:  Medium       Overview Addendum 4/3/2025 10:55 AM by Lisa Mcgarry MD   Hep Be Ag positive ()   Hx of chronic Hep B - seen by ID in 2023   [x] HepB DNA 169K > started tenofovir 300mg every day > 20K  [x] hepatology consult - follow with labs & plan postpartum visit  [x] follow up liver US - no hepatomegaly or cirrhosis   [] baby to get HIB & Hep B vaccines  [] make peds aware  [] needs ID consult after delivery          Breech presentation, no version ([facility]) 2025 by Lisa Mcgarry MD  No    Polyhydramnios in third trimester ([facility]) 2025 by Lisa Mcgarry MD  3/12/2025 by Lisa Mcgarry MD    Priority:  Medium       Overview Addendum 2025  4:15 PM by Angeli Lou MD   MAURO of 26 at 29.0wks   [x] 1hr gtt- 64  [x] anatomy - incomplete though no malformations                  Subjective     Ramez Stoddard is PPD#1 s/p  who reports feeling overall well.  No acute events overnight.  Voiding spontaneously, passing flatus.  Pain well controlled on PO meds.  Light lochia.  Tolerating diet.  Denies HA, SOB, RUQ pain, vision changes.  Denies dizziness/lightheadedness, SOB, palpitations, extreme fatigue, heavy bleeding      Objective   Allergies:   Patient has no known allergies.         Last Vitals:  Temp Pulse Resp BP MAP Pulse Ox   36.6 °C (97.9 °F) 70 16 95/62   98 %     Vitals Min/Max Last 24 Hours:  Temp  Min: 36.2 °C (97.2 °F)  Max: 36.9 °C (98.4 °F)  Pulse  Min: 70  Max: 111  Resp  Min: 16  Max: 18  BP  Min: 93/58  Max: 120/55    Intake/Output:     Intake/Output Summary (Last 24 hours) at 4/18/2025 1608  Last data filed at 4/18/2025 1338  Gross per 24 hour   Intake 2853 ml   Output 3707 ml   Net -854 ml       Physical Exam:  General: examination reveals a well developed, well nourished, female, in no acute distress. She is alert and cooperative.  HEENT: external ears normal. Nose normal, no erythema or discharge.  Neck: supple, no significant adenopathy  Lungs: breathing even and unlabored, lungs clear  Cardiac: warm and well perfused, heart rate regular  Fundus: firm and below umbilicus, lochia light  Abdominal: soft, non-tender, non-distended, bowel sounds active  Extremities: no redness or tenderness in the calves or thighs, edema: non-pitting BLE  Neurological: alert, oriented, normal speech, no focal findings or movement disorder noted.  Psychological: awake and alert; oriented to person, place, and time.  Skin: Dressing intact.   Lab Data:  Labs in chart were reviewed.

## 2025-04-18 NOTE — ANESTHESIA POSTPROCEDURE EVALUATION
Patient: Ramez Stoddard    Procedure Summary       Date: 25 Room / Location: [facility] OB 04 / Virtual [facility] 2 OB    Anesthesia Start:  Anesthesia Stop:     Procedure:  DELIVERY Diagnosis:       Previous  section      History of IUFD      Breech presentation, fetus 1 of multiple gestation ([facility])      (Previous  section [Z98.891])      (History of IUFD [Z87.59])      (Breech presentation, fetus 1 of multiple gestation ([facility]) [O32.1XX1])    Surgeons: Lisa Mcgarry MD Responsible Provider: Angelina Hunt MD    Anesthesia Type: CSE, general ASA Status: 3            Anesthesia Type: CSE, general    Vitals Value Taken Time   /59 25 23:13   Temp 36.4 °C (97.5 °F) 25 21:44   Pulse 92 25 23:13   Resp 16 25 23:13   SpO2 98 % 25 23:13       Anesthesia Post Evaluation    Patient location during evaluation: floor  Patient participation: complete - patient participated  Level of consciousness: awake and alert  Pain management: satisfactory to patient  Multimodal analgesia pain management approach  Airway patency: patent  Two or more strategies used to mitigate risk of obstructive sleep apnea  Cardiovascular status: blood pressure returned to baseline  Respiratory status: acceptable  Hydration status: acceptable  Postoperative Nausea and Vomiting: none  Comments: Ramez Stoddard is a 24 y.o., , who had a , Low Transverse delivery on 2025 at 37w1d and is now POD1.    She had Neuraxial Anesthesia without immediate complications noted.       Pain well controlled    ---------------------------               25                      0451         ---------------------------   BP:           96/60         Pulse:         79           Resp:          18           Temp:   36.3 °C (97.3 °F)   SpO2:          95%         ---------------------------    Neuraxial site assessed. No visible redness or swelling or  drainage. Patient hasn't walked yet but she is able to move all extremities without difficulty. Pt hasn't voided yet. No complaints of nausea/vomiting. Tolerating PO intake well. No s/sx of PDPH.     Anesthesia will sign off     Adam Davis MD          No notable events documented.

## 2025-04-19 LAB
ERYTHROCYTE [DISTWIDTH] IN BLOOD BY AUTOMATED COUNT: 20.8 % (ref 11.5–14.5)
FIBRINOGEN PPP-MCNC: 177 MG/DL (ref 200–400)
HCT VFR BLD AUTO: 23.1 % (ref 36–46)
HGB BLD-MCNC: 6.9 G/DL (ref 12–16)
MCH RBC QN AUTO: 25.3 PG (ref 26–34)
MCHC RBC AUTO-ENTMCNC: 29.9 G/DL (ref 32–36)
MCV RBC AUTO: 85 FL (ref 80–100)
NRBC BLD-RTO: 1 /100 WBCS (ref 0–0)
PLATELET # BLD AUTO: 106 X10*3/UL (ref 150–450)
RBC # BLD AUTO: 2.73 X10*6/UL (ref 4–5.2)
WBC # BLD AUTO: 3.9 X10*3/UL (ref 4.4–11.3)

## 2025-04-19 PROCEDURE — 85670 THROMBIN TIME PLASMA: CPT | Performed by: STUDENT IN AN ORGANIZED HEALTH CARE EDUCATION/TRAINING PROGRAM

## 2025-04-19 PROCEDURE — 99232 SBSQ HOSP IP/OBS MODERATE 35: CPT | Performed by: STUDENT IN AN ORGANIZED HEALTH CARE EDUCATION/TRAINING PROGRAM

## 2025-04-19 PROCEDURE — 2500000004 HC RX 250 GENERAL PHARMACY W/ HCPCS (ALT 636 FOR OP/ED): Mod: JZ,SE | Performed by: NURSE PRACTITIONER

## 2025-04-19 PROCEDURE — 36415 COLL VENOUS BLD VENIPUNCTURE: CPT | Performed by: STUDENT IN AN ORGANIZED HEALTH CARE EDUCATION/TRAINING PROGRAM

## 2025-04-19 PROCEDURE — 85385 FIBRINOGEN ANTIGEN: CPT | Performed by: STUDENT IN AN ORGANIZED HEALTH CARE EDUCATION/TRAINING PROGRAM

## 2025-04-19 PROCEDURE — 2500000005 HC RX 250 GENERAL PHARMACY W/O HCPCS: Mod: SE | Performed by: STUDENT IN AN ORGANIZED HEALTH CARE EDUCATION/TRAINING PROGRAM

## 2025-04-19 PROCEDURE — 85384 FIBRINOGEN ACTIVITY: CPT | Performed by: STUDENT IN AN ORGANIZED HEALTH CARE EDUCATION/TRAINING PROGRAM

## 2025-04-19 PROCEDURE — 85027 COMPLETE CBC AUTOMATED: CPT | Performed by: STUDENT IN AN ORGANIZED HEALTH CARE EDUCATION/TRAINING PROGRAM

## 2025-04-19 PROCEDURE — 1210000001 HC SEMI-PRIVATE ROOM DAILY

## 2025-04-19 PROCEDURE — 2500000002 HC RX 250 W HCPCS SELF ADMINISTERED DRUGS (ALT 637 FOR MEDICARE OP, ALT 636 FOR OP/ED): Mod: SE | Performed by: STUDENT IN AN ORGANIZED HEALTH CARE EDUCATION/TRAINING PROGRAM

## 2025-04-19 PROCEDURE — 2500000001 HC RX 250 WO HCPCS SELF ADMINISTERED DRUGS (ALT 637 FOR MEDICARE OP): Mod: SE | Performed by: STUDENT IN AN ORGANIZED HEALTH CARE EDUCATION/TRAINING PROGRAM

## 2025-04-19 RX ADMIN — ACETAMINOPHEN 975 MG: 325 TABLET, FILM COATED ORAL at 03:21

## 2025-04-19 RX ADMIN — TENOFOVIR DISPROXIL FUMARATE 300 MG: 300 TABLET ORAL at 09:16

## 2025-04-19 RX ADMIN — BENZOCAINE AND MENTHOL 1 LOZENGE: 15; 3.6 LOZENGE ORAL at 00:18

## 2025-04-19 RX ADMIN — LIDOCAINE 1 PATCH: 4 PATCH TOPICAL at 09:17

## 2025-04-19 RX ADMIN — ACETAMINOPHEN 975 MG: 325 TABLET, FILM COATED ORAL at 15:17

## 2025-04-19 RX ADMIN — ACETAMINOPHEN 975 MG: 325 TABLET, FILM COATED ORAL at 09:16

## 2025-04-19 RX ADMIN — ACETAMINOPHEN 975 MG: 325 TABLET, FILM COATED ORAL at 21:14

## 2025-04-19 RX ADMIN — IRON SUCROSE 200 MG: 20 INJECTION, SOLUTION INTRAVENOUS at 09:16

## 2025-04-19 ASSESSMENT — PAIN SCALES - GENERAL
PAINLEVEL_OUTOF10: 4
PAINLEVEL_OUTOF10: 5 - MODERATE PAIN

## 2025-04-19 ASSESSMENT — PAIN DESCRIPTION - DESCRIPTORS
DESCRIPTORS: DISCOMFORT
DESCRIPTORS: DISCOMFORT;SORE

## 2025-04-19 NOTE — PROGRESS NOTES
Postpartum Progress Note    HELENE was used to communicate with patient. Interpretor ID [id number]    Assessment/Plan   Ramez Stoddard is a 24 y.o., , who delivered at 37w1d gestation    Now PPD#2 s/p , Low Transverse on 2025  - Continue routine postpartum care  - Pain well controlled on po medications, no acute concerns today  - DVT risk score DVT Score (IF A SCORE IS NOT CALCULATING, MUST SELECT A BMI TO COMPLETE): 5 , ppx with SCDs, ambulation, lovenox held in the s/o thrombocytopenia  - RH positive, rhogam not indicated  - Hgb:   Results from last 7 days   Lab Units 25  0543 25  2243 25  1258   HEMOGLOBIN g/dL 6.2* 7.2* 8.7*   -Follow up AM labs, recently drawn and pending     Thrombocytopenia, Pancytopenia  - s/p heme consult ,  updated labs with worsening fibrinogenemia, persistent pancytopenia. Most recent labs: hgb 6.2, wbc 3.2, plt 100, and fibrinogen 166  - s/p IV sucrose x2, s/p short prednisone course 3/2025 (noted on outpatient record)  - thrombocytopenia likely 2/2 splenic sequestration (known splenomegaly)   -  -Pt previously counseled extensively on the indication for blood products on  given lab trend and risks, benefits, and alternatives discussed. Patient declined at that time continues to decline this AM. Still okay with IV iron  -s/p IV iron dose   -Avoid NSAIDs  -continue to monitor for s/s of bleeding, currently stable this AM  -Plan for outpatient hematology follow up. Will coordinate with team to arrange.    Hepatitis B Infection   - started on tenofovir , intermittently missing doses after running out  - HBV DNA 169K () -> 11,000 ()   - RUQ 3/27 demonstrating no signs of portal hypertension or cirrhosis  - Peds team aware, following  - For GI outpatient follow up after discharge. Will coordinate tasking with team.    Maternal Well-Being  - Vitals stable  -Mood appropriate  - All questions and concerns address      Feeding  - Breastfeeding/pumping encouraged  - Lactation consult prn    Contraception  - Declines  - Education provided    Dispo  - Anticipate d/c on PPD #3 if meeting all postpartum milestones  - Follow-up in 1-2wks for incision check  - Follow-up in 4-6wks with primary OGYN    Plan of care to be d/w Dr. Joe Lockett MD PGY-4    Assessment & Plan  Labor and delivery indication for care or intervention ([facility])    History of IUFD    Previous  section    Breech presentation, no version ([facility])    Pregnancy Problems (from 23 to present)       Problem Noted Diagnosed Resolved    Labor and delivery indication for care or intervention ([facility]) 2025 by Oumou Nugent MD  No    Priority:  Medium       Flu 2025 by REHANA Payne  No    Priority:  Medium       Overview Signed 2025 10:01 AM by ASAD PayneCNP   - Dx by swab at urgent care, records unavailable for review  - VS WNL, afebrile in triage   - No signs of systemic infection at this time  - Continue tamiflu (started last week, unknown start date)         Maternal iron deficiency anemia affecting pregnancy in third trimester, antepartum 3/18/2025 by Rocco Meléndez MD  No    Priority:  Medium       Overview Addendum 2025  5:01 PM by Lisa Mcgarry MD   Hgb of 8   [x] heme consult   [x] IV iron infusions completed         Fibrinogen decreased ([facility]) 3/12/2025 by Lisa Mcgarry MD  No    Priority:  Medium       Overview Signed 3/12/2025  4:40 PM by Lisa Mcgarry MD   Fibrinogen on 3/4 of 214   [] give FFP & cryo if encounter bleeding during delivery          Genetic carrier status 3/5/2025 by Lisa Mcgarry MD  No    Priority:  Medium       Overview Signed 3/5/2025 10:02 AM by Lisa Mcgarry MD   Pt is an uncertain SMA carrier. Carrier testing showing 2 copies of SMA + SNP. No partner testing.   [] genetic counseling   [] partner testing         Hepatitis A antibody positive 3/5/2025 by Lisa GERARD  MD Zeferino  No    Priority:  Medium       Overview Addendum 3/25/2025 10:13 AM by Lisa Mcgarry MD   Hep A AB total is positive   [x] follow up differentiation - IgM negative         Hungarian  needed 3/4/2025 by Lisa Mcgarry MD  No    Priority:  Medium       History of IUFD 2/15/2025 by Lisa Mcgarry MD  No    Priority:  Medium       Overview Addendum 3/26/2025  4:42 PM by Lisa Mcgarry MD   2024 - IUFD at 39.3wks, delivery via , complicated by placental abruption   [x] weekly NST at 32 wks   [x] genetic screening - rr cfDNA  [x] APLS drawn and wnl on           36 weeks gestation of pregnancy ([facility]) 2/15/2025 by Lisa Mcgarry MD  No    Priority:  Medium       Overview Addendum 2025 11:13 AM by Lisa Mcgarry MD   Desired provider in labor: [] CNM  [x] Physician  [x] Initial BMI: unable to be calculated   [x] Prenatal Labs: Hep B positive, pancytopenia, otherwise WNL  [x] Rh status: positive    [x] Genetic Screening: rr cfDNA  [x] Pregnancy dated by: bedside US on      [x] Anatomy US: incomplete on  though no malformations  [x] 1hr GCT at 24-28wks: 64  [x] Fetal Surveillance (if indicated): weekly at 32wk for IUFD  [x] Tdap (27-36wks): 25     [x] Breastfeeding: planning breast feeding  [x] Postpartum Birth control method: declines  [x] GBS at 36 - 37 wks: negative on   [x] 39 weeks discussion of IOL vs. Expectant management:  37wks  [x] Mode of delivery ( anticipated ): desires TOLAC           Pancytopenia 2025 by JACQUES Camarena-CNP  No    Priority:  Medium       Overview Addendum 4/3/2025 11:10 AM by Lisa Mcgarry MD   Diagnosed on  in triage visit with WBC of 3.0, Hgb of 8.3, and Plt of 40   [x] Hematology appointment on 3/3 - iron infusions planned    Work Up:  CBC with diff, reticulocytes consistent with ongoing pancytopenia.   LDH/uric acid, haptoglobin, direct bili overall non concerning for hemyolysis.   Coags, fibrinogen unremarkable.    Peripheral smear without evidence of malaria  Copper level WNL  Myraid cfDNA - risk reducing.    [x] Liver and spleen ultrasound completed showing splenomegaly, no liver pathology          History of postpartum depression, currently pregnant ([facility]) 2024 by JACQUES Camarena-CNP  No    Priority:  Medium       History of  2024 by JACQUES Camarena-CNP  No    Priority:  Medium       Thrombocytopenia affecting pregnancy, antepartum ([facility]) 7/10/2024 by JACQUES Hines-COREY  No    Priority:  Medium       Overview Addendum 3/25/2025 10:39 AM by Lisa Mcgarry MD   Plt in triage on  of 40, back to baseline with Plt of 67 on 3/21  Hx of thrombocytopenia outside of pregnancy  [x] hematology consult - etiology attributed to liver and spleen pathology  [] If need to increased plt, given plt transfusion         History of  delivery, currently pregnant ([facility]) 2023 by REHANA Camarena  No    Priority:  Medium       Overview Addendum 2025  3:48 PM by Lisa Mcgarry MD   Performed in [facility], unknown uterine incision   S/p  in setting of IUFD in   MFMU score of 94% - desires TOLAC         Hepatitis B complicating pregnancy in second trimester ([facility]) 2023 by ASAD CamarenaCNP  No    Priority:  Medium       Overview Addendum 4/3/2025 10:55 AM by Lisa Mcgarry MD   Hep Be Ag positive ()   Hx of chronic Hep B - seen by ID in 2023   [x] HepB DNA 169K > started tenofovir 300mg every day > 20K  [x] hepatology consult - follow with labs & plan postpartum visit  [x] follow up liver US - no hepatomegaly or cirrhosis   [] baby to get HIB & Hep B vaccines  [] make peds aware  [] needs ID consult after delivery          Breech presentation, no version ([facility]) 2025 by Lisa Mcgarry MD  No    Polyhydramnios in third trimester ([facility]) 2025 by Lisa Mcgarry MD  3/12/2025 by Lisa Mcgarry MD    Priority:  Medium       Overview Addendum 2025  4:15 PM by  Angeli Lou MD   MAURO of 26 at 29.0wks   [x] 1hr gtt- 64  [x] anatomy - incomplete though no malformations                  Subjective     Ramez Stoddard is PPD#2 s/p  who reports feeling overall well.  No acute events overnight.  Voiding spontaneously, passing flatus.  Pain well controlled on PO meds.  Light lochia. Tolerating diet.  Denies HA, SOB, RUQ pain, vision changes.  Denies dizziness/lightheadedness, SOB, palpitations, extreme fatigue, heavy bleeding. Wants to ensure she can go home with baby's birth certificate.       Objective   Allergies:   Patient has no known allergies.         Last Vitals:  Temp Pulse Resp BP MAP Pulse Ox   37 °C (98.6 °F) 83 16 98/61   96 %     Vitals Min/Max Last 24 Hours:  Temp  Min: 36.6 °C (97.9 °F)  Max: 37.2 °C (99 °F)  Pulse  Min: 70  Max: 107  Resp  Min: 16  Max: 17  BP  Min: 93/58  Max: 106/70    Intake/Output:     Intake/Output Summary (Last 24 hours) at 2025 0723  Last data filed at 2025 0000  Gross per 24 hour   Intake 500 ml   Output 550 ml   Net -50 ml       Physical Exam:  General: examination reveals a well developed, well nourished, female, in no acute distress. She is alert and cooperative.  HEENT: external ears normal. Nose normal, no erythema or discharge.  Neck: supple, no significant adenopathy  Lungs: breathing even and unlabored, lungs clear  Cardiac: warm and well perfused, heart rate regular  Fundus: firm and below umbilicus, lochia light  Abdominal: soft, non-tender, non-distended, bowel sounds active, pfannenstiel incision c/d/i  Extremities: no redness or tenderness in the calves or thighs, edema: non-pitting BLE  Neurological: alert, oriented, normal speech, no focal findings or movement disorder noted.  Psychological: awake and alert; oriented to person, place, and time.  Skin:   Lab Data:  Labs in chart were reviewed.

## 2025-04-19 NOTE — CARE PLAN
The patient's goals for the shift include rest, bond with baby    The clinical goals for the shift include VSS, pain well controlled    VSS, pain well controlled with scheduled medication and rest. Lochia scant, voiding and passing gas. Breastfeeding and bonding with baby.    Problem: Pain - Adult  Goal: Verbalizes/displays adequate comfort level or baseline comfort level  Outcome: Progressing     Problem: Safety - Adult  Goal: Free from fall injury  Outcome: Progressing     Problem: Discharge Planning  Goal: Discharge to home or other facility with appropriate resources  Outcome: Progressing     Problem: Chronic Conditions and Co-morbidities  Goal: Patient's chronic conditions and co-morbidity symptoms are monitored and maintained or improved  Outcome: Progressing     Problem: Postpartum  Goal: Experiences normal postpartum course  Outcome: Progressing  Goal: Appropriate maternal -  bonding  Outcome: Progressing  Goal: Establish and maintain infant feeding pattern for adequate nutrition  Outcome: Progressing  Goal: Incisions, wounds, or drain sites healing without S/S of infection  Outcome: Progressing  Goal: No s/sx infection  Outcome: Progressing  Goal: No s/sx of hemorrhage  Outcome: Progressing  Goal: Minimal s/sx of HDP and BP<160/110  Outcome: Progressing     Problem:  Recovery Care  Goal: Verbalizes understanding of post-op instructions  Outcome: Progressing  Goal: Manages discomfort  Outcome: Progressing  Goal: Dressing intact until removed with any drainage marked  Outcome: Progressing  Goal: Patient vital signs are stable  Outcome: Progressing  Goal: Urine output is 0.5 mL/kg/hr or more  Outcome: Progressing  Goal: Fundus firm at midline  Outcome: Progressing     Problem: Anemia in pregnancy  Goal: Tolerates treatment for anemia  Outcome: Progressing

## 2025-04-20 VITALS
WEIGHT: 170.64 LBS | RESPIRATION RATE: 18 BRPM | DIASTOLIC BLOOD PRESSURE: 77 MMHG | OXYGEN SATURATION: 97 % | HEIGHT: 67 IN | BODY MASS INDEX: 26.78 KG/M2 | TEMPERATURE: 99.3 F | HEART RATE: 85 BPM | SYSTOLIC BLOOD PRESSURE: 116 MMHG

## 2025-04-20 LAB
C TRACH RRNA SPEC QL NAA+PROBE: POSITIVE
ERYTHROCYTE [DISTWIDTH] IN BLOOD BY AUTOMATED COUNT: 21 % (ref 11.5–14.5)
FIBRINOGEN PPP-MCNC: 223 MG/DL (ref 200–400)
HCT VFR BLD AUTO: 26.1 % (ref 36–46)
HGB BLD-MCNC: 7.6 G/DL (ref 12–16)
MCH RBC QN AUTO: 24.9 PG (ref 26–34)
MCHC RBC AUTO-ENTMCNC: 29.1 G/DL (ref 32–36)
MCV RBC AUTO: 86 FL (ref 80–100)
N GONORRHOEA DNA SPEC QL PROBE+SIG AMP: NEGATIVE
NRBC BLD-RTO: 0.8 /100 WBCS (ref 0–0)
PLATELET # BLD AUTO: 99 X10*3/UL (ref 150–450)
RBC # BLD AUTO: 3.05 X10*6/UL (ref 4–5.2)
WBC # BLD AUTO: 4.8 X10*3/UL (ref 4.4–11.3)

## 2025-04-20 PROCEDURE — 85027 COMPLETE CBC AUTOMATED: CPT | Performed by: STUDENT IN AN ORGANIZED HEALTH CARE EDUCATION/TRAINING PROGRAM

## 2025-04-20 PROCEDURE — 2500000002 HC RX 250 W HCPCS SELF ADMINISTERED DRUGS (ALT 637 FOR MEDICARE OP, ALT 636 FOR OP/ED): Mod: SE | Performed by: STUDENT IN AN ORGANIZED HEALTH CARE EDUCATION/TRAINING PROGRAM

## 2025-04-20 PROCEDURE — 87491 CHLMYD TRACH DNA AMP PROBE: CPT | Performed by: STUDENT IN AN ORGANIZED HEALTH CARE EDUCATION/TRAINING PROGRAM

## 2025-04-20 PROCEDURE — 85385 FIBRINOGEN ANTIGEN: CPT | Performed by: STUDENT IN AN ORGANIZED HEALTH CARE EDUCATION/TRAINING PROGRAM

## 2025-04-20 PROCEDURE — 36415 COLL VENOUS BLD VENIPUNCTURE: CPT | Performed by: STUDENT IN AN ORGANIZED HEALTH CARE EDUCATION/TRAINING PROGRAM

## 2025-04-20 PROCEDURE — RXMED WILLOW AMBULATORY MEDICATION CHARGE

## 2025-04-20 PROCEDURE — 2500000004 HC RX 250 GENERAL PHARMACY W/ HCPCS (ALT 636 FOR OP/ED): Mod: JZ,SE | Performed by: NURSE PRACTITIONER

## 2025-04-20 PROCEDURE — 2500000001 HC RX 250 WO HCPCS SELF ADMINISTERED DRUGS (ALT 637 FOR MEDICARE OP): Mod: SE | Performed by: STUDENT IN AN ORGANIZED HEALTH CARE EDUCATION/TRAINING PROGRAM

## 2025-04-20 PROCEDURE — 85384 FIBRINOGEN ACTIVITY: CPT | Performed by: STUDENT IN AN ORGANIZED HEALTH CARE EDUCATION/TRAINING PROGRAM

## 2025-04-20 PROCEDURE — 85670 THROMBIN TIME PLASMA: CPT | Performed by: STUDENT IN AN ORGANIZED HEALTH CARE EDUCATION/TRAINING PROGRAM

## 2025-04-20 RX ORDER — OXYCODONE HYDROCHLORIDE 5 MG/1
5 TABLET ORAL EVERY 6 HOURS PRN
Qty: 15 TABLET | Refills: 0 | Status: SHIPPED | OUTPATIENT
Start: 2025-04-20

## 2025-04-20 RX ORDER — POLYETHYLENE GLYCOL 3350 17 G/17G
17 POWDER, FOR SOLUTION ORAL DAILY
Qty: 510 G | Refills: 0 | Status: SHIPPED | OUTPATIENT
Start: 2025-04-20 | End: 2025-06-19

## 2025-04-20 RX ORDER — ACETAMINOPHEN 325 MG/1
650 TABLET ORAL EVERY 6 HOURS PRN
Qty: 30 TABLET | Refills: 0 | Status: SHIPPED | OUTPATIENT
Start: 2025-04-20

## 2025-04-20 RX ADMIN — TENOFOVIR DISPROXIL FUMARATE 300 MG: 300 TABLET ORAL at 09:02

## 2025-04-20 RX ADMIN — ACETAMINOPHEN 975 MG: 325 TABLET, FILM COATED ORAL at 03:36

## 2025-04-20 RX ADMIN — ACETAMINOPHEN 975 MG: 325 TABLET, FILM COATED ORAL at 09:02

## 2025-04-20 RX ADMIN — ACETAMINOPHEN 975 MG: 325 TABLET, FILM COATED ORAL at 16:35

## 2025-04-20 RX ADMIN — BENZOCAINE AND MENTHOL 1 LOZENGE: 15; 3.6 LOZENGE ORAL at 00:55

## 2025-04-20 ASSESSMENT — PAIN DESCRIPTION - DESCRIPTORS
DESCRIPTORS: DISCOMFORT
DESCRIPTORS: DISCOMFORT

## 2025-04-20 ASSESSMENT — PAIN SCALES - GENERAL
PAINLEVEL_OUTOF10: 0 - NO PAIN
PAINLEVEL_OUTOF10: 2
PAINLEVEL_OUTOF10: 0 - NO PAIN
PAINLEVEL_OUTOF10: 2

## 2025-04-20 NOTE — DISCHARGE SUMMARY
Discharge Summary    Admission Date: 2025  Discharge Date: 2025    Discharge Diagnosis  Labor and delivery indication for care or intervention ([facility])    Hospital Course  Delivery Date: 2025 6:51 PM  Delivery type: , Low Transverse   GA at delivery: 37w1d  Outcome: Living  Anesthesia during delivery: Combined spinal-epidural  Intrapartum complications: None  Feeding method: Breastfeeding Status: Yes     Procedures: none  Contraception at discharge: none    Postpartum course uneventful. Patient is ambulating well, tolerating a regular diet, and voiding spontaneously. Fundus has remained firm and lochia is appropriate. Pain is well-controlled on PO meds. Patient has met all postpartum milestones and will be discharged home on POD#2 with follow up in 4-6 weeks with OB provider. See remaining problem based hospital course as below:    Thrombocytopenia, Pancytopenia  - s/p heme consult ,  updated labs with worsening fibrinogenemia, persistent pancytopenia.  - s/p IV sucrose x2, s/p short prednisone course 3/2025 (noted on outpatient record)  - thrombocytopenia likely 2/2 splenic sequestration (known splenomegaly)   -  -Pt previously counseled extensively on the indication for blood products on  given lab trend and risks, benefits, and alternatives discussed. Patient contined to decline while inpatient.   - Lab Trend:        Hgb  8.7 > 7.2 > 6.2 > 6.9 > 7.6       PLT 97>97 > 101 > 100 > 106 > 99       Fibrinogen 226 > 178 > 166 > 177 > 223  -s/p IV iron -  -Avoid NSAIDs for homegoing  -HDS on DOD  -Heme Onc Team aware of patient, to be scheduled for close outpatient follow up     Hepatitis B Infection   - started on tenofovir , intermittently missing doses after running out  - HBV DNA 169K () -> 11,000 ()   - RUQ 3/27 demonstrating no signs of portal hypertension or cirrhosis  - Peds team aware, following  - For GI outpatient follow up after discharge. Team  contacted while inpatient for follow up      Feeding  - Breastfeeding/pumping encouraged  - Lactation consult prn outpatient     Contraception  - Declines       Pertinent Physical Exam At Time of Discharge    General: no acute distress  HEENT: normocephalic, atraumatic  Heart: warm and well perfused  Lungs: breathing comfortably on room air  Abdomen: soft, nontender, nondistended, no rebound guarding or tenderness, cesearean scar c/d/i  Extremities: moving all extremities  Neuro: awake and conversant  Psych: appropriate mood and affect     Last Vitals:  Temp Pulse Resp BP MAP Pulse Ox   37.1 °C (98.8 °F) 80 17 100/64 76 97 %     Discharge Meds     Your medication list        START taking these medications        Instructions Last Dose Given Next Dose Due   acetaminophen 325 mg tablet  Commonly known as: Tylenol      Take 2 tablets (650 mg) by mouth every 6 hours if needed for mild pain (1 - 3).       oxyCODONE 5 mg immediate release tablet  Commonly known as: Roxicodone      Take 1 tablet (5 mg) by mouth every 6 hours if needed for moderate pain (4 - 6) or severe pain (7 - 10).       polyethylene glycol 17 gram/dose powder  Commonly known as: Glycolax, Miralax      Mix 17 g of powder and drink once daily.              CONTINUE taking these medications        Instructions Last Dose Given Next Dose Due   FeroSuL 325 mg (65 mg iron) tablet  Generic drug: ferrous sulfate      Take 1 tablet (325 mg) by mouth once daily with breakfast.       Prenatabs Rx 29 mg iron- 1 mg tablet  Generic drug: prenatal vit,calc76-iron-folic      Take 1 tablet by mouth once daily.       tenofovir disoproxil fumarate 300 mg tablet  Commonly known as: Viread      Take 1 tablet (300 mg) by mouth once daily.       tenofovir disoproxil fumarate 300 mg tablet  Commonly known as: Viread      Take 1 tablet (300 mg) by mouth once daily.                 Where to Get Your Medications        These medications were sent to [facility]  [address] [address]      Hours: 8:30 AM to 5 PM Mon-Fri Phone: [phone]   acetaminophen 325 mg tablet  oxyCODONE 5 mg immediate release tablet  polyethylene glycol 17 gram/dose powder          Complications Requiring Follow-Up  Postpartum Care  Pancytopenia  HBV    Test Results Pending At Discharge  Pending Labs       Order Current Status    C. trachomatis / N. gonorrhoeae, Amplified, Urogenital In process    Fibrinogen antigen In process    Fibrinogen antigen In process    Fibrinogen antigen In process    Surgical Pathology Exam - PLACENTA In process    Thrombin time In process    Thrombin time In process            Outpatient Follow-Up  Future Appointments   Date Time Provider Department Center   4/30/2025  1:30 PM [id number] OBGYN NURSE RESOURCE [id number]   5/28/2025  1:30 PM Lisa Mcgarry MD [id number]           Bjorn Lockett MD

## 2025-04-20 NOTE — CARE PLAN
The patient's goals for the shift include   Problem: Pain - Adult  Goal: Verbalizes/displays adequate comfort level or baseline comfort level  Outcome: Met     Problem: Safety - Adult  Goal: Free from fall injury  Outcome: Met     Problem: Discharge Planning  Goal: Discharge to home or other facility with appropriate resources  Outcome: Met     Problem: Postpartum  Goal: Experiences normal postpartum course  Outcome: Met         VSS, bleeding and swelling minimal, pain controlled with medications, IV removed, incision infection free, breastfeeding.

## 2025-04-20 NOTE — CARE PLAN
The patient's goals for the shift include rest, bond with infant    The clinical goals for the shift include VSS, pain well controlled    VSS, pain well controlled with scheduled medication and rest. Lochia scant and voiding. Breastfeeding and bonding with infant.   Problem: Pain - Adult  Goal: Verbalizes/displays adequate comfort level or baseline comfort level  Outcome: Progressing     Problem: Safety - Adult  Goal: Free from fall injury  Outcome: Progressing     Problem: Discharge Planning  Goal: Discharge to home or other facility with appropriate resources  Outcome: Progressing     Problem: Chronic Conditions and Co-morbidities  Goal: Patient's chronic conditions and co-morbidity symptoms are monitored and maintained or improved  Outcome: Progressing     Problem: Postpartum  Goal: Experiences normal postpartum course  Outcome: Progressing  Goal: Appropriate maternal -  bonding  Outcome: Progressing  Goal: Establish and maintain infant feeding pattern for adequate nutrition  Outcome: Progressing  Goal: Incisions, wounds, or drain sites healing without S/S of infection  Outcome: Progressing  Goal: No s/sx infection  Outcome: Progressing  Goal: No s/sx of hemorrhage  Outcome: Progressing  Goal: Minimal s/sx of HDP and BP<160/110  Outcome: Progressing     Problem: Anemia in pregnancy  Goal: Tolerates treatment for anemia  Outcome: Progressing

## 2025-04-21 LAB
THROMBIN TIME: 16.9 SECONDS (ref 10.3–16.6)
THROMBIN TIME: 17.7 SECONDS (ref 10.3–16.6)

## 2025-04-22 ENCOUNTER — SPECIALTY PHARMACY (OUTPATIENT)
Dept: PHARMACY | Facility: CLINIC | Age: 25
End: 2025-04-22

## 2025-04-22 LAB — FIBRINOGEN AG PPP IA-MCNC: 139 MG/DL (ref 149–353)

## 2025-04-22 PROCEDURE — RXMED WILLOW AMBULATORY MEDICATION CHARGE

## 2025-04-23 ENCOUNTER — SPECIALTY PHARMACY (OUTPATIENT)
Dept: PHARMACY | Facility: CLINIC | Age: 25
End: 2025-04-23

## 2025-04-23 ENCOUNTER — APPOINTMENT (OUTPATIENT)
Dept: OBSTETRICS AND GYNECOLOGY | Facility: CLINIC | Age: 25
End: 2025-04-23
Payer: MEDICAID

## 2025-04-23 NOTE — PROGRESS NOTES
[facility] Clinical Note  Initial Patient Education     Introduction  Ramez Stoddard is a 24 y.o. female who is on the specialty pharmacy service for management of: Hepatology Core.    Ramez Stoddard is initiating the following therapy: tenofovir disoproxil 300 mg, take 1 tab po daily      Medication receipt date: 4/25/25    Duration of therapy: Maintenance    The most recent encounter visit with the referring prescriber Dr. Downey/ Post on 4/2/25 was reviewed.  Pharmacy will continue to collaborate in the care of this patient with the referring prescriber.    Clinical Background  An initial assessment was conducted prior to first fill of the medication to determine the appropriateness of therapy given the patient's diagnosis, medication list, comorbidities, allergies, medical history, patient's ability to self administer medication, and therapeutic goals based on possible outcomes of therapy. Refer to initial assessment task completed on 4/2/25.    Labs for clinical appropriateness that were reviewed include:   Hepatology - Hepatitis B Virus Labs:   Lab Results   Component Value Date    ALT 8 04/17/2025    AST 19 04/17/2025    BILIDIR 0.3 04/14/2025    HBVDNAPCRIUM 11,000 (H) 04/14/2025    HEPBSAG Reactive (A) 02/14/2025    CREATININE 0.29 (L) 04/17/2025    and FibroScan:     Education/Discussion  Ramez was contacted on 4/23/2025 at 11:03 AM for a pharmacy visit with encounter number [id number] from:   [facility]  [address] [place] [id number]  Dept: [phone]  Dept Fax: [phone]  Ramez consented to a/an Telephone visit, which was performed.  Initially spoke with her brother Wilmar, and then needed to call back with an  in [facility]. Utilized  #[id number]. Patient stated it would be okay to discuss her medical matters with her brother Wilmar in the future.    Medication Start Date (planned or actual):  2/20/25      Education was conducted prior to start of therapy? No - Patient is new to  Specialty Pharmacy but already established on current medication therapy    Education discussed includes the following:  Patient Education  Counseled the Patient on the Following : Theraputic rationale and expected outcomes, Expected duration of therapy, Doses and administration, Possible side effects and management, Adherence and missed doses, Possible drug interactions, Lab monitoring and follow-up, Safe handling, storage, and disposal  Learner: Patient  Education Method: Explanation  Education Response: Verbalizes understanding  Additional details of the medication specific counseling are found within the linked patient education flowsheet.     The follow up timeline was discussed. Every person responds to and reacts to therapy differently. Patient should be assessed for efficacy and tolerability in approximately: 3 months    Provided education on goals and possible outcomes of therapy:  Adherence with therapy  Timely completion of appropriate labs  Timely and appropriate follow up with provider  Identify and address medication interactions with presciption medications, OTC medications and supplements  Optimize or maintain quality of life  Hepatology: Prevention of long-term negative clinical outcomes (e.g. cirrhosis, liver transplantation, HCC, death) by durable suppression of HBV DNA  Prevention and suppression of HBV DNA replication  Prevent comorbid complications from untreated HBV including hepatocellular carcinoma, liver transplant and end-stage liver disease           The importance of adherence was discussed and they were advised to take the medication as prescribed by their provider.         Impression/Plan  Review and Assessment   Reviewed During This Encounter: Medications  Medications Assessed for Appropriate Use, Dose, Route, Frequency, and Duration: Yes  Medication Reconciliation Completed: Yes  Drug  Interactions Evaluated: Yes  Clinically Relevant Drug Interactions Identified: No    This patient has been identified as high risk due to Pregnancy.  The following action was taken: Patient/caregiver encouraged to participate in patient management program and Follow up timeline adjusted for high risk status.    QOL/Patient Satisfaction  Rate your quality of life on scale of 1-10: 10 - Completely satisfied  Rate your satisfaction with  Specialty Pharmacy on scale of 1-10: 10 - Completely satisfied    The  Specialty Pharmacy Welcome packet may be viewed here:   Specialty Pharmacy Welcome Packet     Or by scanning QR code:      Provided contact information ([phone]) for [facility] and reviewed dispensing process, refill timeline and patient management follow up. Advised to contact the pharmacy if there are any adverse effects and/or changes to medication list, including prescriptions, OTC medications, herbal products, or supplements. Confirmed understanding of education conducted during assessment. All questions and concerns were addressed and patient was encouraged to reach out for additional questions or concerns.    Patient is pregnant; discussed that per the medication package insert, they state the medication has not been studied in pregnancy. The medication can cross the placenta. However, there are studies that have shown no teratogenic effects on her baby. She states that she discussed it with her OBGYN; benefit outweighs risks in this case due to increased HBV viral load and elevated liver enzymes.    Ebony Moore, PharmD

## 2025-04-24 ENCOUNTER — PHARMACY VISIT (OUTPATIENT)
Dept: PHARMACY | Facility: CLINIC | Age: 25
End: 2025-04-24
Payer: MEDICAID

## 2025-04-24 LAB
LABORATORY COMMENT REPORT: NORMAL
PATH REPORT.FINAL DX SPEC: NORMAL
PATH REPORT.GROSS SPEC: NORMAL
PATH REPORT.RELEVANT HX SPEC: NORMAL
PATH REPORT.TOTAL CANCER: NORMAL
RESIDENT REVIEW: NORMAL

## 2025-04-25 LAB
FIBRINOGEN AG PPP IA-MCNC: 196 MG/DL (ref 149–353)
FIBRINOGEN AG PPP IA-MCNC: 215 MG/DL (ref 149–353)

## 2025-04-28 ENCOUNTER — DOCUMENTATION (OUTPATIENT)
Dept: HEMATOLOGY/ONCOLOGY | Facility: HOSPITAL | Age: 25
End: 2025-04-28
Payer: MEDICAID

## 2025-04-29 ENCOUNTER — TELEPHONE (OUTPATIENT)
Dept: OBSTETRICS AND GYNECOLOGY | Facility: HOSPITAL | Age: 25
End: 2025-04-29
Payer: MEDICAID

## 2025-04-29 NOTE — TELEPHONE ENCOUNTER
"Called patient, spoke with Wilmar (brother) and reviewed incision check appt tomorrow.   Confirmed transportation needed.   Round trip ride scheduled.   time: 12:45p  Return Ride: \"will call\", notify this NP when patient visit complete.   Will continue to be available for coordination as needed.     Glenis Craig CNP  Complex/High Risk OB   [phone]  "

## 2025-04-30 ENCOUNTER — SOCIAL WORK (OUTPATIENT)
Dept: PEDIATRICS | Facility: CLINIC | Age: 25
End: 2025-04-30

## 2025-04-30 ENCOUNTER — CLINICAL SUPPORT (OUTPATIENT)
Dept: OBSTETRICS AND GYNECOLOGY | Facility: CLINIC | Age: 25
End: 2025-04-30
Payer: MEDICAID

## 2025-04-30 VITALS
DIASTOLIC BLOOD PRESSURE: 72 MMHG | BODY MASS INDEX: 23.65 KG/M2 | HEART RATE: 78 BPM | SYSTOLIC BLOOD PRESSURE: 110 MMHG | WEIGHT: 151 LBS

## 2025-04-30 DIAGNOSIS — Z51.89 VISIT FOR WOUND CHECK: ICD-10-CM

## 2025-04-30 PROCEDURE — 99211 OFF/OP EST MAY X REQ PHY/QHP: CPT | Performed by: OBSTETRICS & GYNECOLOGY

## 2025-04-30 ASSESSMENT — PAIN SCALES - GENERAL: PAINLEVEL_OUTOF10: 0 - NO PAIN

## 2025-04-30 ASSESSMENT — PAIN - FUNCTIONAL ASSESSMENT: PAIN_FUNCTIONAL_ASSESSMENT: 0-10
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
## 2025-05-01 ENCOUNTER — PHARMACY VISIT (OUTPATIENT)
Dept: PHARMACY | Facility: CLINIC | Age: 25
End: 2025-05-01
Payer: MEDICAID

## 2025-05-01 DIAGNOSIS — A74.9 CHLAMYDIA: Primary | ICD-10-CM

## 2025-05-01 PROCEDURE — RXMED WILLOW AMBULATORY MEDICATION CHARGE

## 2025-05-01 RX ORDER — DOXYCYCLINE 100 MG/1
100 CAPSULE ORAL 2 TIMES DAILY
Qty: 14 CAPSULE | Refills: 0 | Status: SHIPPED | OUTPATIENT
Start: 2025-05-01 | End: 2025-05-08
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
## 2025-05-22 ENCOUNTER — SPECIALTY PHARMACY (OUTPATIENT)
Dept: PHARMACY | Facility: CLINIC | Age: 25
End: 2025-05-22

## 2025-05-22 PROCEDURE — RXMED WILLOW AMBULATORY MEDICATION CHARGE

## 2025-05-26 PROBLEM — J11.1 FLU: Status: RESOLVED | Noted: 2025-04-14 | Resolved: 2025-05-26

## 2025-05-27 ENCOUNTER — PHARMACY VISIT (OUTPATIENT)
Dept: PHARMACY | Facility: CLINIC | Age: 25
End: 2025-05-27
Payer: MEDICAID

## 2025-06-25 ENCOUNTER — SPECIALTY PHARMACY (OUTPATIENT)
Dept: PHARMACY | Facility: CLINIC | Age: 25
End: 2025-06-25

## 2025-06-25 PROCEDURE — RXMED WILLOW AMBULATORY MEDICATION CHARGE

## 2025-06-26 ENCOUNTER — PHARMACY VISIT (OUTPATIENT)
Dept: PHARMACY | Facility: CLINIC | Age: 25
End: 2025-06-26
Payer: MEDICAID

## 2025-07-25 ENCOUNTER — PHARMACY VISIT (OUTPATIENT)
Dept: PHARMACY | Facility: CLINIC | Age: 25
End: 2025-07-25
Payer: MEDICAID

## 2025-07-25 ENCOUNTER — SPECIALTY PHARMACY (OUTPATIENT)
Dept: PHARMACY | Facility: CLINIC | Age: 25
End: 2025-07-25

## 2025-07-25 PROCEDURE — RXMED WILLOW AMBULATORY MEDICATION CHARGE

## 2025-08-05 ENCOUNTER — SPECIALTY PHARMACY (OUTPATIENT)
Dept: PHARMACY | Facility: HOSPITAL | Age: 25
End: 2025-08-05
Payer: MEDICAID

## 2025-08-05 NOTE — PROGRESS NOTES
[facility] Clinical Note  Patient Reassessment     Introduction  Ramez Stoddard is a 24 y.o. female who is on the specialty pharmacy service for management of: Hepatology Core.      [facility] supplied medication: tenofovir disoproxil 300 mg, take 1 tab po daily        Duration of therapy: Maintenance    The most recent encounter visit with the referring prescriber Dr. Downey/ Post on 4/2/25 was reviewed.  Pharmacy will continue to collaborate in the care of this patient with the referring prescriber.    Discussion  Ramez was contacted on 8/5/2025 at 3:35 PM for a pharmacy visit with encounter number [id number] from:   [facility]  [address]  [address]  [facility] [place] [address]  Dept: [phone]  Dept Fax: [phone]  Loc: [phone]  Caregiver consented to a/an Telephone visit, which was performed. Spoke with her brother, Wilmar, as patient consented to at the last education call    Efficacy  Patient has developed new symptoms of condition: No  Patient/caregiver feels medication is affecting the disease state: patient feels the same, has not yet repeated labs    Goals  Provided education on goals and possible outcomes of therapy:  Adherence with therapy  Timely completion of appropriate labs  Timely and appropriate follow up with provider  Identify and address medication interactions with presciption medications, OTC medications and supplements  Optimize or maintain quality of life  Hepatology: Prevention of long-term negative clinical outcomes (e.g. cirrhosis, liver transplantation, HCC, death) by durable suppression of HBV DNA  Prevention and suppression of HBV DNA replication  Reduce symptoms of HBV (if applicable)  Patient has documented target(s) for goals of therapy: No        Tolerance  Patient has experienced side effects from this medication: No  Changes to current therapy regimen: No    The follow-up timeline was  discussed. Every person responds to and reacts to therapy differently. Patient should be assessed for efficacy and tolerability in approximately: 6 months            Adherence  Patient Information  Informant: Other Relative (spoke with her brother, Wilmar)  Demonstrates Understanding of Importance of Adherence: Yes  Does the patient have any barriers to self-administration (including physical and mental?): No  Medication Information  Medication: tenofovir disoproxil fumarate (Viread)  Patient Reported Missed Doses in the Last 4 Weeks: 0  Estimated Medication Adherence Level: %  Barriers to Adherence: No Problems identified   The importance of adherence was discussed and patient/caregiver was advised to take the medication as prescribed by their provider. Encouraged patient/caregiver to call physician's office or specialty pharmacy if they have a question regarding a missed dose.    General Assessment  Changes to home medications, OTCs or supplements: No  Current Medications[1]  Reported new allergies: No  Reported new medical conditions: No, however patient recently delivered a baby and is currently nursing  Additional monitoring reviewed: Hepatology - Hepatitis B Virus Labs:   Lab Results   Component Value Date    ALT 8 04/17/2025    AST 19 04/17/2025    BILIDIR 0.3 04/14/2025    HBVDNAPCRIUM 11,000 (H) 04/14/2025    HEPBSAG Reactive (A) 02/14/2025    CREATININE 0.29 (L) 04/17/2025     Is laboratory follow up needed? No    Advised to contact the pharmacy if there are any changes to the patient's medication list, including prescriptions, OTC medications, herbal products, or supplements.    Impression/Plan  This patient has been identified as high risk due to Pregnancy.  The following action was taken:Patient/caregiver encouraged to participate in patient management program and Engaged patient support system; also scheduled follow up for 5 months instead of 6 to remind her about follow up with her  provider          QOL/Patient Satisfaction  Rate your quality of life on scale of 1-10: 9  Rate your satisfaction with  Specialty Pharmacy on scale of 1-10: 10 - Completely satisfied    Provided contact information ([phone]) for [facility] and reviewed dispensing process, refill timeline and patient management follow up. Confirmed understanding of education conducted during assessment. All questions and concerns were addressed and patient/caregiver was encouraged to reach out for additional questions or concerns.    Based on the patient's diagnosis, medication list, progress towards goals, adherence, tolerance, and medication list, medication remains appropriate: Therapy remains appropriate (I attest)    Patient was not feeling well today; experiencing abdominal pain; advised that she schedule follow up with her OBGYN to rule out post-partum complications.    Patient was supposed to schedule follow up with hepatology after delivery; remind her at the next reassessment as she is juggling a new baby and health issues at the moment.    Ebony Moore, PharmD       [1]   Current Outpatient Medications   Medication Sig Dispense Refill    acetaminophen (Tylenol) 325 mg tablet Take 2 tablets (650 mg) by mouth every 6 hours if needed for mild pain (1 - 3). 30 tablet 0    oxyCODONE (Roxicodone) 5 mg immediate release tablet Take 1 tablet (5 mg) by mouth every 6 hours if needed for moderate pain (4 - 6) or severe pain (7 - 10). 15 tablet 0    tenofovir disoproxil fumarate (Viread) 300 mg tablet Take 1 tablet (300 mg) by mouth once daily. 30 tablet 11    tenofovir disoproxil fumarate (Viread) 300 mg tablet Take 1 tablet (300 mg) by mouth once daily. 30 tablet 11     No current facility-administered medications for this visit.

## (undated) DEVICE — DRAPE PACK, CESAREAN SECTION, CUSTOM, UHC

## (undated) DEVICE — TOWEL PACK, STERILE, 4/PACK, BLUE